# Patient Record
Sex: FEMALE | Race: BLACK OR AFRICAN AMERICAN | Employment: OTHER | URBAN - METROPOLITAN AREA
[De-identification: names, ages, dates, MRNs, and addresses within clinical notes are randomized per-mention and may not be internally consistent; named-entity substitution may affect disease eponyms.]

---

## 2017-05-11 ENCOUNTER — OFFICE VISIT (OUTPATIENT)
Dept: ORTHOPEDIC SURGERY | Facility: CLINIC | Age: 62
End: 2017-05-11

## 2017-05-11 VITALS
HEART RATE: 70 BPM | TEMPERATURE: 98 F | DIASTOLIC BLOOD PRESSURE: 72 MMHG | BODY MASS INDEX: 35.87 KG/M2 | SYSTOLIC BLOOD PRESSURE: 143 MMHG | WEIGHT: 229 LBS

## 2017-05-11 DIAGNOSIS — M16.11 PRIMARY OSTEOARTHRITIS OF RIGHT HIP: Primary | ICD-10-CM

## 2017-05-11 DIAGNOSIS — Z96.641 HISTORY OF TOTAL RIGHT HIP REPLACEMENT: ICD-10-CM

## 2017-05-11 DIAGNOSIS — M16.12 PRIMARY OSTEOARTHRITIS OF LEFT HIP: ICD-10-CM

## 2017-05-11 RX ORDER — BETAMETHASONE SODIUM PHOSPHATE AND BETAMETHASONE ACETATE 3; 3 MG/ML; MG/ML
6 INJECTION, SUSPENSION INTRA-ARTICULAR; INTRALESIONAL; INTRAMUSCULAR; SOFT TISSUE ONCE
Qty: 1 ML | Refills: 0
Start: 2017-05-11 | End: 2017-05-11

## 2017-05-11 NOTE — PATIENT INSTRUCTIONS
A hip injection has been ordered for you. A [employer] will be contacting you to schedule the appointment as soon as it has been approved with your insurance company. Please schedule an appointment to follow up with the doctor or the physicians assistant after the hip injection has been conducted. Joint Injections: Care Instructions  Your Care Instructions  Joint injections are shots into a joint, such as the knee. They may be used to put in medicines, such as pain relievers. Or they can be used to take out fluid. Sometimes the fluid is tested in a lab. This can help find the cause of a joint problem. A corticosteroid, or steroid, shot is used to reduce inflammation in tendons or joints. It is often used to treat problems such as arthritis, tendinitis, and bursitis. Steroids can be injected directly into a painful, inflamed joint. They can also help reduce inflammation of a bursa. A bursa is a sac of fluid. It cushions and lubricates areas where tendons, ligaments, skin, muscles, or bones rub against each other. A steroid shot can sometimes help with short-term pain relief when other treatments haven't worked. If steroid shots help, pain may improve for weeks or months. Follow-up care is a key part of your treatment and safety. Be sure to make and go to all appointments, and call your doctor if you are having problems. It's also a good idea to know your test results and keep a list of the medicines you take. How can you care for yourself at home? · Put ice or a cold pack on the area for 10 to 20 minutes at a time. Put a thin cloth between the ice and your skin. · Take anti-inflammatory medicines to reduce pain, swelling, or inflammation. These include ibuprofen (Advil, Motrin) and naproxen (Aleve). Read and follow all instructions on the label. · Avoid strenuous activities for several days, especially those that put stress on the area where you got the shot.   · If you have dressings over the area, keep them clean and dry. You may remove them when your doctor tells you to. When should you call for help? Call your doctor now or seek immediate medical care if:  · You have signs of infection, such as:  ¨ Increased pain, swelling, warmth, or redness. ¨ Red streaks leading from the site. ¨ Pus draining from the site. ¨ A fever. Watch closely for changes in your health, and be sure to contact your doctor if you have any problems. Where can you learn more? Go to http://rain-tracey.info/. Enter N616 in the search box to learn more about \"Joint Injections: Care Instructions. \"  Current as of: May 23, 2016  Content Version: 11.2  © 5058-8048 [employer]. Care instructions adapted under license by [employer] (which disclaims liability or warranty for this information). If you have questions about a medical condition or this instruction, always ask your healthcare professional. [place] [address] any warranty or liability for your use of this information.

## 2017-05-11 NOTE — MR AVS SNAPSHOT
Visit Information Date & Time Provider Department Dept. Phone Encounter #  
 5/11/2017  9:15 AM Thomas Humphrey MD [facility] [phone] [id number] Upcoming Health Maintenance Date Due Hepatitis C Screening 1955 DTaP/Tdap/Td series (1 - Tdap) 8/28/1975 FOBT Q 1 YEAR AGE 50-75 3/20/2014 ZOSTER VACCINE AGE 60> 8/28/2014 PAP AKA CERVICAL CYTOLOGY 9/20/2014 INFLUENZA AGE 9 TO ADULT 8/1/2017 BREAST CANCER SCRN MAMMOGRAM 12/27/2018 Allergies as of 5/11/2017  Review Complete On: 5/11/2017 By: Thomas Humphrey MD  
 No Known Allergies Current Immunizations  Reviewed on 3/19/2013 No immunizations on file. Not reviewed this visit You Were Diagnosed With   
  
 Codes Comments Primary osteoarthritis of right hip    -  Primary ICD-10-CM: M16.11 
ICD-9-CM: 715.15 History of total right hip replacement     ICD-10-CM: J44.059 ICD-9-CM: V43.64 Primary osteoarthritis of left hip     ICD-10-CM: M16.12 
ICD-9-CM: 715.15 Vitals BP Pulse Temp Weight(growth percentile) BMI OB Status 143/72 (BP 1 Location: Left arm, BP Patient Position: Sitting) 70 98 °F (36.7 °C) (Oral) 229 lb (103.9 kg) 35.87 kg/m2 Postmenopausal  
 Smoking Status Former Smoker Vitals History BMI and BSA Data Body Mass Index Body Surface Area  
 35.87 kg/m 2 2.22 m 2 Preferred Pharmacy Pharmacy Name Phone [phone] [place] [address] [phone] Your Updated Medication List  
  
   
This list is accurate as of: 5/11/17 10:24 AM.  Always use your most recent med list.  
  
  
  
  
 albuterol 90 mcg/actuation inhaler Commonly known as:  Lilly Morillo Take 1-2 Puffs by inhalation every four (4) hours as needed for Wheezing. ALEVE 220 mg tablet Generic drug:  naproxen sodium Take 220 mg by mouth two (2) times daily (with meals). AMITIZA 24 mcg capsule Generic drug:  lubiPROStone Take 24 mcg by mouth. betamethasone 6 mg/mL injection Commonly known as:  CELESTONE SOLUSPAN  
1 mL by IntraBURSal route once for 1 dose. CALCITRATE-VITAMIN D PO Take  by mouth. cetirizine 10 mg tablet Commonly known as:  ZYRTEC Take  by mouth daily. gabapentin 100 mg capsule Commonly known as:  NEURONTIN Take  by mouth three (3) times daily. HYDROcodone-acetaminophen 5-325 mg per tablet Commonly known as:  Fredick Sly Take 1 Tab by mouth every four (4) hours as needed. inhalational spacing device Commonly known as:  AEROCHAMBER  
1 Each by Does Not Apply route as needed. LIPITOR 20 mg tablet Generic drug:  atorvastatin Take 20 mg by mouth daily. Indications: HYPERCHOLESTEROLEMIA  
  
 * meloxicam 15 mg tablet Commonly known as:  MOBIC Take 15 mg by mouth daily. * meloxicam 15 mg tablet Commonly known as:  MOBIC  
1 tab by mouth daily with food MIRALAX 17 gram packet Generic drug:  polyethylene glycol Take 17 g by mouth daily. MUCINEX 1,200 mg Ta12 ER tablet Generic drug:  guaiFENesin Take 1,200 mg by mouth two (2) times a day. prednisoLONE acetate 1 % ophthalmic suspension Commonly known as:  PRED FORTE Administer 1 Drop to both eyes four (4) times daily. REFRESH LIQUIGEL 1 % Dlgl Generic drug:  carboxymethylcellulose sodium Apply  to eye. TYLENOL EXTRA STRENGTH 500 mg tablet Generic drug:  acetaminophen Take 500 mg by mouth every six (6) hours as needed. warfarin 7.5 mg tablet Commonly known as:  COUMADIN Take 1 Tab by mouth daily. * Notice: This list has 2 medication(s) that are the same as other medications prescribed for you. Read the directions carefully, and ask your doctor or other care provider to review them with you. We Performed the Following AMB POC XRAY, HIPS, BILAT, MIN 5 VIEWS [70705 CPT(R)] BETAMETHASONE ACETATE & SODIUM PHOSPHATE INJECTION 3 MG EA. [ [facility]] DRAIN/INJECT LARGE JOINT/BURSA [id number] CPT(R)] To-Do List   
 05/18/2017 Imaging:  XR FLUORO GUIDE ASP/BX/INJ/LOC Patient Instructions A hip injection has been ordered for you. A [facility] [employer] will be contacting you to schedule the appointment as soon as it has been approved with your insurance company. Please schedule an appointment to follow up with the doctor or the physicians assistant after the hip injection has been conducted. Joint Injections: Care Instructions Your Care Instructions Joint injections are shots into a joint, such as the knee. They may be used to put in medicines, such as pain relievers. Or they can be used to take out fluid. Sometimes the fluid is tested in a lab. This can help find the cause of a joint problem. A corticosteroid, or steroid, shot is used to reduce inflammation in tendons or joints. It is often used to treat problems such as arthritis, tendinitis, and bursitis. Steroids can be injected directly into a painful, inflamed joint. They can also help reduce inflammation of a bursa. A bursa is a sac of fluid. It cushions and lubricates areas where tendons, ligaments, skin, muscles, or bones rub against each other. A steroid shot can sometimes help with short-term pain relief when other treatments haven't worked. If steroid shots help, pain may improve for weeks or months. Follow-up care is a key part of your treatment and safety. Be sure to make and go to all appointments, and call your doctor if you are having problems. It's also a good idea to know your test results and keep a list of the medicines you take. How can you care for yourself at home? · Put ice or a cold pack on the area for 10 to 20 minutes at a time. Put a thin cloth between the ice and your skin. · Take anti-inflammatory medicines to reduce pain, swelling, or inflammation. These include ibuprofen (Advil, Motrin) and naproxen (Aleve). Read and follow all instructions on the label. · Avoid strenuous activities for several days, especially those that put stress on the area where you got the shot. · If you have dressings over the area, keep them clean and dry. You may remove them when your doctor tells you to. When should you call for help? Call your doctor now or seek immediate medical care if: 
· You have signs of infection, such as: 
¨ Increased pain, swelling, warmth, or redness. ¨ Red streaks leading from the site. ¨ Pus draining from the site. ¨ A fever. Watch closely for changes in your health, and be sure to contact your doctor if you have any problems. Where can you learn more? Go to http://rain-tracey.info/. Enter N616 in the search box to learn more about \"Joint Injections: Care Instructions. \" Current as of: May 23, 2016 Content Version: 11.2 © 9495-6421 [employer]. Care instructions adapted under license by [employer] (which disclaims liability or warranty for this information). If you have questions about a medical condition or this instruction, always ask your healthcare professional. [address] any warranty or liability for your use of this information. Introducing [facility]! Dear Tristan Kirkpatrick: Thank you for requesting a PostRocket account. Our records indicate that you already have an active PostRocket account. You can access your account anytime at [email or web address] [email or web address]. [employer]/Next Performance Did you know that you can access your hospital and ER discharge instructions at any time in PostRocket? You can also review all of your test results from your hospital stay or ER visit. Additional Information If you have questions, please visit the Frequently Asked Questions section of the [employer] website at [email or web address] Remember, [employer]ena[employer] is NOT to be used for urgent needs. For medical emergencies, dial 911. Now available from your iPhone and Android! Please provide this summary of care documentation to your next provider. Your primary care clinician is listed as Deborah Frausto. If you have any questions after today's visit, please call [phone].

## 2017-05-11 NOTE — PROGRESS NOTES
Patient: Antonella Montiel                MRN: [id number]       SSN: [id number]  YOB: 1955        AGE: 57 y.o. SEX: female  Body mass index is 35.87 kg/(m^2). PCP: Philipp Tang MD  05/11/17    HISTORY: Ms. 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 She has had a right hip replacement done by me about four years ago and done very well. She is plagued with a little bit of bursitis but very happy. She is here today complaining of left hip pain. It is moderate, aching. It can hurt at night. She has groin pain and also lateral pain. The low back is not particularly tender. She denies fevers or chills. She otherwise has been very healthy. She does a fair bit of walking during the day. All systems are reviewed and are updated on the EMR. No recent hospital admissions or health problems. PHYSICAL EXAMINATION:  The right hip examines very benignly. She has good motion. She has a well-healed incision and minimal tenderness. Good strength. The left hip has moderate stiffness with internal rotation. Tenderness in the groin and also laterally. Low back is only minimally tender. Sensation is normal. Calf is non-tender. Homans sign is negative. PROCEDURE:  Under aseptic conditions and after informed, written consent with a time out, the left trochanteric bursa was injected with 1 cc of the Celestone preparation, i.e. 6 mg, which was well tolerated. PLAN:  We will arrange for an intraarticular injection. Hopefully, this will buy her a couple years with routine injections for her. She remains a delightful lady. It has been a pleasure to share in her care.            REVIEW OF SYSTEMS:      CON: negative for weight loss, fever  EYE: negative for double vision  ENT: negative for hoarseness  RS:   negative for Tb  GI:    negative for blood in stool  :  negative for blood in urine  Other systems reviewed and noted below. Past Medical History:   Diagnosis Date    Arthritis     Cancer ([facility] [address] 2001    Lobular situ-Left breast    Chronic pain     DVT (deep venous thrombosis) (HCC)     GERD (gastroesophageal reflux disease)     Hx of radiation therapy     Hyperlipidemia     Hypertension     Low back pain potentially associated with radiculopathy     Osteopenia     mild    Pulmonary embolism (HCC)     S/P hip replacement     Right hip    Trochanteric bursitis of right hip     Wears glasses        Family History   Problem Relation Age of Onset    Asthma Other     Arthritis-osteo Other     Breast Cancer Maternal Aunt     Diabetes Mother     Heart Disease Mother     Alzheimer Mother     Diabetes Sister     Hypertension Sister     COPD Sister     Diabetes Brother     Hypertension Brother        Current Outpatient Prescriptions   Medication Sig Dispense Refill    betamethasone (CELESTONE SOLUSPAN) 6 mg/mL injection 1 mL by IntraBURSal route once for 1 dose. 1 mL 0    naproxen sodium (ALEVE) 220 mg tablet Take 220 mg by mouth two (2) times daily (with meals).  carboxymethylcellulose sodium (REFRESH LIQUIGEL) 1 % dlgl Apply  to eye.  cetirizine (ZYRTEC) 10 mg tablet Take  by mouth daily.  guaiFENesin (MUCINEX) 1,200 mg TM12 ER tablet Take 1,200 mg by mouth two (2) times a day.  polyethylene glycol (MIRALAX) 17 gram packet Take 17 g by mouth daily.  Inhalational Spacing Device (AEROCHAMBER) 1 Each by Does Not Apply route as needed. 1 Device 0    CALCIUM CITRATE/VITAMIN D3 (CALCITRATE-VITAMIN D PO) Take  by mouth.  acetaminophen (TYLENOL EXTRA STRENGTH) 500 mg tablet Take 500 mg by mouth every six (6) hours as needed.  atorvastatin (LIPITOR) 20 mg tablet Take 20 mg by mouth daily.     Indications: HYPERCHOLESTEROLEMIA      meloxicam (MOBIC) 15 mg tablet 1 tab by mouth daily with food 30 Tab 2    gabapentin (NEURONTIN) 100 mg capsule Take  by mouth three (3) times daily.      meloxicam (MOBIC) 15 mg tablet Take 15 mg by mouth daily.  prednisoLONE acetate (PRED FORTE) 1 % ophthalmic suspension Administer 1 Drop to both eyes four (4) times daily.  albuterol (PROVENTIL, VENTOLIN) 90 mcg/actuation inhaler Take 1-2 Puffs by inhalation every four (4) hours as needed for Wheezing. 17 g 0    HYDROcodone-acetaminophen (NORCO) 5-325 mg per tablet Take 1 Tab by mouth every four (4) hours as needed. 20 Tab 0    warfarin (COUMADIN) 7.5 mg tablet Take 1 Tab by mouth daily. 7 Tab 0    lubiPROStone (AMITIZA) 24 mcg capsule Take 24 mcg by mouth. No Known Allergies    Past Surgical History:   Procedure Laterality Date    HX HIP REPLACEMENT  2/12/2003    Right hip    HX MASTECTOMY  2001    Left partial    HX OTHER SURGICAL Bilateral 2014    bilat styes on eyes    HX TUBAL LIGATION  1983       Social History     Social History    Marital status:      Spouse name: N/A    Number of children: N/A    Years of education: N/A     Occupational History    Not on file. Social History Main Topics    Smoking status: Former Smoker     Packs/day: 1.00     Years: 20.00     Types: Cigarettes    Smokeless tobacco: Never Used      Comment: QUIT APPROX 2000    Alcohol use Yes      Comment: HOLIDAYS     Drug use: No    Sexual activity: Not on file     Other Topics Concern    Not on file     Social History Narrative       Visit Vitals    /72 (BP 1 Location: Left arm, BP Patient Position: Sitting)    Pulse 70    Temp 98 °F (36.7 °C) (Oral)    Wt 229 lb (103.9 kg)    BMI 35.87 kg/m2         PHYSICAL EXAMINATION:  GENERAL: Alert and oriented x3, in no acute distress, well-developed, well-nourished, afebrile. HEART: No JVD. EYES: No scleral icterus   NECK: No significant lymphadenopathy   LUNGS: No respiratory compromise or indrawing  ABDOMEN: Soft, non-tender, non-distended. Electronically signed by:  Chilango Huang MD

## 2017-05-23 ENCOUNTER — HOSPITAL ENCOUNTER (OUTPATIENT)
Dept: GENERAL RADIOLOGY | Age: 62
Discharge: HOME | End: 2017-05-23
Attending: ORTHOPAEDIC SURGERY
Payer: COMMERCIAL

## 2017-05-23 DIAGNOSIS — M16.12 PRIMARY OSTEOARTHRITIS OF LEFT HIP: ICD-10-CM

## 2017-05-23 PROCEDURE — 74011636320 HC RX REV CODE- 636/320: Performed by: ORTHOPAEDIC SURGERY

## 2017-05-23 PROCEDURE — 77002 NEEDLE LOCALIZATION BY XRAY: CPT

## 2017-05-23 PROCEDURE — 74011250636 HC RX REV CODE- 250/636: Performed by: ORTHOPAEDIC SURGERY

## 2017-05-23 PROCEDURE — 74011000250 HC RX REV CODE- 250: Performed by: ORTHOPAEDIC SURGERY

## 2017-05-23 RX ORDER — LIDOCAINE HYDROCHLORIDE 10 MG/ML
30 INJECTION, SOLUTION EPIDURAL; INFILTRATION; INTRACAUDAL; PERINEURAL
Status: COMPLETED | OUTPATIENT
Start: 2017-05-23 | End: 2017-05-23

## 2017-05-23 RX ORDER — METHYLPREDNISOLONE ACETATE 40 MG/ML
40 INJECTION, SUSPENSION INTRA-ARTICULAR; INTRALESIONAL; INTRAMUSCULAR; SOFT TISSUE
Status: COMPLETED | OUTPATIENT
Start: 2017-05-23 | End: 2017-05-23

## 2017-05-23 RX ADMIN — IOPAMIDOL 2 ML: 408 INJECTION, SOLUTION INTRATHECAL at 11:00

## 2017-05-23 RX ADMIN — LIDOCAINE HYDROCHLORIDE 12 ML: 10 INJECTION, SOLUTION EPIDURAL; INFILTRATION; INTRACAUDAL; PERINEURAL at 11:00

## 2017-05-23 RX ADMIN — METHYLPREDNISOLONE ACETATE 40 MG: 40 INJECTION, SUSPENSION INTRA-ARTICULAR; INTRALESIONAL; INTRAMUSCULAR; SOFT TISSUE at 11:00

## 2017-08-16 ENCOUNTER — OFFICE VISIT (OUTPATIENT)
Dept: ORTHOPEDIC SURGERY | Facility: CLINIC | Age: 62
End: 2017-08-16

## 2017-08-16 VITALS
TEMPERATURE: 96.6 F | WEIGHT: 232 LBS | OXYGEN SATURATION: 98 % | BODY MASS INDEX: 36.41 KG/M2 | SYSTOLIC BLOOD PRESSURE: 123 MMHG | HEIGHT: 67 IN | HEART RATE: 52 BPM | DIASTOLIC BLOOD PRESSURE: 68 MMHG

## 2017-08-16 DIAGNOSIS — M70.62 TROCHANTERIC BURSITIS OF LEFT HIP: ICD-10-CM

## 2017-08-16 DIAGNOSIS — M16.12 PRIMARY OSTEOARTHRITIS OF LEFT HIP: Primary | ICD-10-CM

## 2017-08-16 RX ORDER — BETAMETHASONE SODIUM PHOSPHATE AND BETAMETHASONE ACETATE 3; 3 MG/ML; MG/ML
6 INJECTION, SUSPENSION INTRA-ARTICULAR; INTRALESIONAL; INTRAMUSCULAR; SOFT TISSUE ONCE
Qty: 1 ML | Refills: 0
Start: 2017-08-16 | End: 2017-08-16

## 2017-08-16 NOTE — PROGRESS NOTES
Patient: Julia Kwok                MRN: [id number]       SSN: [id number]  YOB: 1955        AGE: 57 y.o. SEX: female  Body mass index is 36.34 kg/(m^2). PCP: James Hess MD  08/16/17    HISTORY: I had the pleasure of reviewing Alberta Pereira. Alberta Pereira is a very nice lady. 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  She had a previous right hip replacement and has done well. She is plagued with bursitis bilaterally. She is here today with left hip pain. It is moderate, aching, and mainly laterally based. It is somewhat in the groin. In the springtime, we arranged for an intra-articular and also a bursal injection, and the bursal injection actually helped more than the intra-articular injection. There is more lateral pain and occasional groin pain. She denies fevers or chills and is otherwise feeling well. PHYSICAL EXAMINATION:  On examination today, she is a very nice lady. She has a BMI of 36. She moves the head and neck adequately. There is no respiratory compromise or indrawing. There is no scleral icterus. There is no JVD.  abdominal exam is nontender. The right hip replacement examines benignly. There are nicely healed incisions. The skin is normal.  She has very good strength, including abductors. The left hip has fairly well-preserved range of motion including internal rotation. The calf is nontender. She is tender over the greater trochanter. The low back is also moderately tender as well. There is no foot drop. Sensation is grossly intact. The calf is nontender. Tib/ant and EHL are 5/5. RADIOGRAPHS:  X-rays confirm moderate arthritis of the left hip. IMPRESSION:  My overall impression is mainly trochanteric bursitis, but also some arthritis of the left hip.       PROCEDURE:  Under aseptic conditions and after informed, written consent with a time out, the left trochanteric bursitis was injected with 1 cc of the Celestone preparation, i.e. 6 mg, which was well tolerated. PLAN:  She will return to see us when she would like, and we can also arrange for an intra-articular injection. Hopefully, she is many years away from needing a left hip replacement. We will follow accordingly. REVIEW OF SYSTEMS:      CON: negative for weight loss, fever  EYE: negative for double vision  ENT: negative for hoarseness  RS:   negative for Tb  GI:    negative for blood in stool  :  negative for blood in urine  Other systems reviewed and noted below. Past Medical History:   Diagnosis Date    Arthritis     Cancer ([facility] [address] 2001    Lobular situ-Left breast    Chronic pain     DVT (deep venous thrombosis) (HCC)     GERD (gastroesophageal reflux disease)     Hx of radiation therapy     Hyperlipidemia     Hypertension     Low back pain potentially associated with radiculopathy     Osteopenia     mild    Pulmonary embolism (HCC)     S/P hip replacement     Right hip    Trochanteric bursitis of right hip     Wears glasses        Family History   Problem Relation Age of Onset    Asthma Other     Arthritis-osteo Other     Breast Cancer Maternal Aunt     Diabetes Mother     Heart Disease Mother     Alzheimer Mother     Diabetes Sister     Hypertension Sister     COPD Sister     Diabetes Brother     Hypertension Brother        Current Outpatient Prescriptions   Medication Sig Dispense Refill    betamethasone (CELESTONE SOLUSPAN) 6 mg/mL injection 1 mL by IntraBURSal route once for 1 dose. 1 mL 0    naproxen sodium (ALEVE) 220 mg tablet Take 220 mg by mouth two (2) times daily (with meals).  cetirizine (ZYRTEC) 10 mg tablet Take  by mouth daily.  polyethylene glycol (MIRALAX) 17 gram packet Take 17 g by mouth daily.  albuterol (PROVENTIL, VENTOLIN) 90 mcg/actuation inhaler Take 1-2 Puffs by inhalation every four (4) hours as needed for Wheezing.  17 g 0    Inhalational Spacing Device (AEROCHAMBER) 1 Each by Does Not Apply route as needed. 1 Device 0    CALCIUM CITRATE/VITAMIN D3 (CALCITRATE-VITAMIN D PO) Take  by mouth.  atorvastatin (LIPITOR) 20 mg tablet Take 20 mg by mouth daily. Indications: HYPERCHOLESTEROLEMIA      meloxicam (MOBIC) 15 mg tablet 1 tab by mouth daily with food 30 Tab 2    gabapentin (NEURONTIN) 100 mg capsule Take  by mouth three (3) times daily.  meloxicam (MOBIC) 15 mg tablet Take 15 mg by mouth daily.  carboxymethylcellulose sodium (REFRESH LIQUIGEL) 1 % dlgl Apply  to eye.  guaiFENesin (MUCINEX) 1,200 mg TM12 ER tablet Take 1,200 mg by mouth two (2) times a day.  prednisoLONE acetate (PRED FORTE) 1 % ophthalmic suspension Administer 1 Drop to both eyes four (4) times daily.  HYDROcodone-acetaminophen (NORCO) 5-325 mg per tablet Take 1 Tab by mouth every four (4) hours as needed. 20 Tab 0    warfarin (COUMADIN) 7.5 mg tablet Take 1 Tab by mouth daily. 7 Tab 0    lubiPROStone (AMITIZA) 24 mcg capsule Take 24 mcg by mouth.  acetaminophen (TYLENOL EXTRA STRENGTH) 500 mg tablet Take 500 mg by mouth every six (6) hours as needed. No Known Allergies    Past Surgical History:   Procedure Laterality Date    HX HIP REPLACEMENT  2/12/2003    Right hip    HX MASTECTOMY  2001    Left partial    HX OTHER SURGICAL Bilateral 2014    bilat styes on eyes    HX TUBAL LIGATION  1983       Social History     Social History    Marital status:      Spouse name: N/A    Number of children: N/A    Years of education: N/A     Occupational History    Not on file.      Social History Main Topics    Smoking status: Former Smoker     Packs/day: 1.00     Years: 20.00     Types: Cigarettes    Smokeless tobacco: Never Used      Comment: QUIT APPROX 2000    Alcohol use Yes      Comment: HOLIDAYS     Drug use: No    Sexual activity: Not on file     Other Topics Concern    Not on file     Social History Narrative       Visit Vitals  /68    Pulse (!) 52    Temp 96.6 °F (35.9 °C) (Oral)    Ht 5' 7\" (1.702 m)    Wt 232 lb (105.2 kg)    SpO2 98%    BMI 36.34 kg/m2         PHYSICAL EXAMINATION:  GENERAL: Alert and oriented x3, in no acute distress, well-developed, well-nourished, afebrile. HEART: No JVD. EYES: No scleral icterus   NECK: No significant lymphadenopathy   LUNGS: No respiratory compromise or indrawing  ABDOMEN: Soft, non-tender, non-distended. Electronically signed by:  Brenton Craig MD

## 2017-10-10 ENCOUNTER — OFFICE VISIT (OUTPATIENT)
Dept: ORTHOPEDIC SURGERY | Facility: CLINIC | Age: 62
End: 2017-10-10

## 2017-10-10 DIAGNOSIS — M16.12 PRIMARY OSTEOARTHRITIS OF LEFT HIP: ICD-10-CM

## 2017-10-10 DIAGNOSIS — Z96.641 HISTORY OF TOTAL RIGHT HIP REPLACEMENT: Primary | ICD-10-CM

## 2017-10-10 DIAGNOSIS — M70.62 TROCHANTERIC BURSITIS OF LEFT HIP: ICD-10-CM

## 2017-10-10 RX ORDER — BETAMETHASONE SODIUM PHOSPHATE AND BETAMETHASONE ACETATE 3; 3 MG/ML; MG/ML
6 INJECTION, SUSPENSION INTRA-ARTICULAR; INTRALESIONAL; INTRAMUSCULAR; SOFT TISSUE ONCE
Qty: 1 ML | Refills: 0
Start: 2017-10-10 | End: 2017-10-10

## 2017-10-10 NOTE — PROGRESS NOTES
[address], [address] [place] [address]  [phone]           Patient: Thad Nunez                MRN: [id number]       SSN: [id number]  YOB: 1955        AGE: 60 y.o. SEX: female  There is no height or weight on file to calculate BMI. PCP: Xiao Alxeander MD  10/10/17      This office note has been dictated. REVIEW OF SYSTEMS:  Constitutional: Negative for fever, chills, weight loss and malaise/fatigue. HENT: Negative. Eyes: Negative. Respiratory: Negative. Cardiovascular: Negative. Gastrointestinal: No bowel incontinence or constipation. Genitourinary: No bladder incontinence or saddle anesthesia. Skin: Negative. Neurological: Negative. Endo/Heme/Allergies: Negative. Psychiatric/Behavioral: Negative. Musculoskeletal: As per HPI above. Past Medical History:   Diagnosis Date    Arthritis     Cancer ([facility] [address] 2001    Lobular situ-Left breast    Chronic pain     DVT (deep venous thrombosis) (HCC)     GERD (gastroesophageal reflux disease)     Hx of radiation therapy     Hyperlipidemia     Hypertension     Low back pain potentially associated with radiculopathy     Osteopenia     mild    Pulmonary embolism (HCC)     S/P hip replacement     Right hip    Trochanteric bursitis of right hip     Wears glasses          Current Outpatient Prescriptions:     meloxicam (MOBIC) 15 mg tablet, 1 tab by mouth daily with food, Disp: 30 Tab, Rfl: 2    naproxen sodium (ALEVE) 220 mg tablet, Take 220 mg by mouth two (2) times daily (with meals). , Disp: , Rfl:     gabapentin (NEURONTIN) 100 mg capsule, Take  by mouth three (3) times daily. , Disp: , Rfl:     meloxicam (MOBIC) 15 mg tablet, Take 15 mg by mouth daily. , Disp: , Rfl:     carboxymethylcellulose sodium (REFRESH LIQUIGEL) 1 % dlgl, Apply  to eye., Disp: , Rfl:     cetirizine (ZYRTEC) 10 mg tablet, Take  by mouth daily. , Disp: , Rfl:   guaiFENesin (MUCINEX) 1,200 mg TM12 ER tablet, Take 1,200 mg by mouth two (2) times a day., Disp: , Rfl:     polyethylene glycol (MIRALAX) 17 gram packet, Take 17 g by mouth daily. , Disp: , Rfl:     prednisoLONE acetate (PRED FORTE) 1 % ophthalmic suspension, Administer 1 Drop to both eyes four (4) times daily. , Disp: , Rfl:     albuterol (PROVENTIL, VENTOLIN) 90 mcg/actuation inhaler, Take 1-2 Puffs by inhalation every four (4) hours as needed for Wheezing., Disp: 17 g, Rfl: 0    Inhalational Spacing Device (AEROCHAMBER), 1 Each by Does Not Apply route as needed. , Disp: 1 Device, Rfl: 0    HYDROcodone-acetaminophen (NORCO) 5-325 mg per tablet, Take 1 Tab by mouth every four (4) hours as needed. , Disp: 20 Tab, Rfl: 0    warfarin (COUMADIN) 7.5 mg tablet, Take 1 Tab by mouth daily. , Disp: 7 Tab, Rfl: 0    CALCIUM CITRATE/VITAMIN D3 (CALCITRATE-VITAMIN D PO), Take  by mouth., Disp: , Rfl:     lubiPROStone (AMITIZA) 24 mcg capsule, Take 24 mcg by mouth.  , Disp: , Rfl:     acetaminophen (TYLENOL EXTRA STRENGTH) 500 mg tablet, Take 500 mg by mouth every six (6) hours as needed. , Disp: , Rfl:     atorvastatin (LIPITOR) 20 mg tablet, Take 20 mg by mouth daily. Indications: HYPERCHOLESTEROLEMIA, Disp: , Rfl:     No Known Allergies    Social History     Social History    Marital status:      Spouse name: N/A    Number of children: N/A    Years of education: N/A     Occupational History    Not on file.      Social History Main Topics    Smoking status: Former Smoker     Packs/day: 1.00     Years: 20.00     Types: Cigarettes    Smokeless tobacco: Never Used      Comment: QUIT APPROX 2000    Alcohol use Yes      Comment: HOLIDAYS     Drug use: No    Sexual activity: Not on file     Other Topics Concern    Not on file     Social History Narrative       Past Surgical History:   Procedure Laterality Date    HX HIP REPLACEMENT  2/12/2003    Right hip    HX MASTECTOMY  2001    Left partial    HX OTHER SURGICAL Bilateral 2014    bilat styes on eyes    HX TUBAL LIGATION  1983           * Patient was identified by name and date of birth   * Agreement on procedure being performed was verified  * Risks and Benefits explained to the patient  * Procedure site verified and marked as necessary  * Patient was positioned for comfort  * Consent was signed and verified  10:07 AM    The patient was instructed on post injection care. We did see Ms. Albaro Lugo for followup with regards to her bilateral hips. She is status post right hip replacement. She is now about four years out. She is doing quite well with it. She does have known advancing arthritis of the left hip. She has had an intra-articular injection, which gave her good relief. She is having more laterally based discomfort to the left hip. She does have a history of trochanteric bursitis and had injections in the past.  She is requesting a repeat injection today. She has had no recent fevers, chills, systemic changes, and no injuries to report and no chest pain or shortness of breath. She has had no change in her bowel or bladder habits and no radiating pain or numbness down the lower extremities. PHYSICAL EXAMINATION:  In general, the patient is alert and oriented x 3 in no acute distress. The patient is well-developed, well-nourished, with a normal affect. The patient is afebrile. HEENT:  Head is normocephalic and atraumatic. Pupils are equally round and reactive to light and accommodation. Extraocular eye movements are intact. Neck is supple. Trachea is midline. No JVD is present. Breathing is nonlabored. Examination of the lower extremities reveals good range of motion of the right hip. There is some slight tenderness to palpation to the trochanteric bursa. Abduction strength is 5/5 and symmetric bilaterally. The left hip has slightly decreased range of motion. Internal rotation does reproduce some groin discomfort.   She does have pain to palpation to the trochanteric bursa laterally. RADIOGRAPHS:  Review of previous radiographs show on the right total knee components are well-fixed. No evidence of loosening or fracture is noted. She does have some reaction towards the distal stem without loosening noted. The left hip shows advanced osteoarthritis with minimal cartilage remaining. ASSESSMENT:      1. Right hip replacement doing well. 2. Left hip advanced osteoarthritis. 3. Left hip trochanteric bursitis. PLAN:  At this point, we are going to move forward with a cortisone injection for hypertension left hip today. Under aseptic conditions, and after informed and written consent, and a time out performed, the left hip was prepped with Betadine and 6 mg of Celestone was injected without complications. The patient tolerated the injection well. The patient was instructed on post injection care. We will see her back in the office in about three months time for evaluation. We will plan on repeat x-rays of her hips upon her return.                       JR Clemente SANDERS, JASON, ATC

## 2017-11-30 ENCOUNTER — TELEPHONE (OUTPATIENT)
Dept: ORTHOPEDIC SURGERY | Age: 62
End: 2017-11-30

## 2017-11-30 NOTE — TELEPHONE ENCOUNTER
Patient called to request another left hip injection. Patient received last injection 10/10/17 and was advised next visit scheduled for 1/11/18, but patient doesn't want to wait that long for injection, can we offer her an injection before then?   Please advise patient at her work # [phone]

## 2017-11-30 NOTE — TELEPHONE ENCOUNTER
Called patient and explained that we are unable to give her an injection at a sooner date. Patient understood.

## 2018-01-02 ENCOUNTER — HOSPITAL ENCOUNTER (OUTPATIENT)
Dept: MAMMOGRAPHY | Age: 63
Discharge: HOME | End: 2018-01-02
Attending: FAMILY MEDICINE
Payer: COMMERCIAL

## 2018-01-02 DIAGNOSIS — Z12.31 VISIT FOR SCREENING MAMMOGRAM: ICD-10-CM

## 2018-01-02 PROCEDURE — 77063 BREAST TOMOSYNTHESIS BI: CPT

## 2018-01-25 ENCOUNTER — OFFICE VISIT (OUTPATIENT)
Dept: ORTHOPEDIC SURGERY | Facility: CLINIC | Age: 63
End: 2018-01-25

## 2018-01-25 VITALS
OXYGEN SATURATION: 99 % | HEART RATE: 55 BPM | HEIGHT: 67 IN | TEMPERATURE: 96.6 F | BODY MASS INDEX: 35.47 KG/M2 | WEIGHT: 226 LBS | DIASTOLIC BLOOD PRESSURE: 77 MMHG | SYSTOLIC BLOOD PRESSURE: 137 MMHG

## 2018-01-25 DIAGNOSIS — M70.61 TROCHANTERIC BURSITIS, RIGHT HIP: Primary | ICD-10-CM

## 2018-01-25 RX ORDER — BETAMETHASONE SODIUM PHOSPHATE AND BETAMETHASONE ACETATE 3; 3 MG/ML; MG/ML
6 INJECTION, SUSPENSION INTRA-ARTICULAR; INTRALESIONAL; INTRAMUSCULAR; SOFT TISSUE ONCE
Qty: 1 ML | Refills: 0
Start: 2018-01-25 | End: 2018-01-25

## 2018-01-25 NOTE — PROGRESS NOTES
[address] [place] [address]  [phone]           Patient: Ashok Graf                MRN: [id number]       SSN: [id number]  YOB: 1955        AGE: 60 y.o. SEX: female  Body mass index is 35.4 kg/(m^2). PCP: Susanne Ochoa MD  01/25/18      This office note has been dictated. REVIEW OF SYSTEMS:  Constitutional: Negative for fever, chills, weight loss and malaise/fatigue. HENT: Negative. Eyes: Negative. Respiratory: Negative. Cardiovascular: Negative. Gastrointestinal: No bowel incontinence or constipation. Genitourinary: No bladder incontinence or saddle anesthesia. Skin: Negative. Neurological: Negative. Endo/Heme/Allergies: Negative. Psychiatric/Behavioral: Negative. Musculoskeletal: As per HPI above. Past Medical History:   Diagnosis Date    Arthritis     Breast cancer ([facility] [address]     Left    Cancer ([facility] [address] 2001    Lobular situ-Left breast    Chronic pain     DVT (deep venous thrombosis) (HCC)     GERD (gastroesophageal reflux disease)     Hx of radiation therapy     Hyperlipidemia     Hypertension     Low back pain potentially associated with radiculopathy     Menopause     Osteopenia     mild    Pulmonary embolism (HCC)     Radiation therapy complication     Left breast    S/P hip replacement     Right hip    Trochanteric bursitis of right hip     Wears glasses          Current Outpatient Prescriptions:     naproxen sodium (ALEVE) 220 mg tablet, Take 220 mg by mouth two (2) times daily (with meals). , Disp: , Rfl:     cetirizine (ZYRTEC) 10 mg tablet, Take  by mouth daily. , Disp: , Rfl:     polyethylene glycol (MIRALAX) 17 gram packet, Take 17 g by mouth daily. , Disp: , Rfl:     prednisoLONE acetate (PRED FORTE) 1 % ophthalmic suspension, Administer 1 Drop to both eyes four (4) times daily. , Disp: , Rfl:     albuterol (PROVENTIL, VENTOLIN) 90 mcg/actuation inhaler, Take 1-2 Puffs by inhalation every four (4) hours as needed for Wheezing., Disp: 17 g, Rfl: 0    Inhalational Spacing Device (AEROCHAMBER), 1 Each by Does Not Apply route as needed. , Disp: 1 Device, Rfl: 0    CALCIUM CITRATE/VITAMIN D3 (CALCITRATE-VITAMIN D PO), Take  by mouth., Disp: , Rfl:     acetaminophen (TYLENOL EXTRA STRENGTH) 500 mg tablet, Take 500 mg by mouth every six (6) hours as needed. , Disp: , Rfl:     atorvastatin (LIPITOR) 20 mg tablet, Take 20 mg by mouth daily. Indications: HYPERCHOLESTEROLEMIA, Disp: , Rfl:     meloxicam (MOBIC) 15 mg tablet, 1 tab by mouth daily with food, Disp: 30 Tab, Rfl: 2    gabapentin (NEURONTIN) 100 mg capsule, Take  by mouth three (3) times daily. , Disp: , Rfl:     meloxicam (MOBIC) 15 mg tablet, Take 15 mg by mouth daily. , Disp: , Rfl:     carboxymethylcellulose sodium (REFRESH LIQUIGEL) 1 % dlgl, Apply  to eye., Disp: , Rfl:     guaiFENesin (MUCINEX) 1,200 mg TM12 ER tablet, Take 1,200 mg by mouth two (2) times a day., Disp: , Rfl:     HYDROcodone-acetaminophen (NORCO) 5-325 mg per tablet, Take 1 Tab by mouth every four (4) hours as needed. , Disp: 20 Tab, Rfl: 0    warfarin (COUMADIN) 7.5 mg tablet, Take 1 Tab by mouth daily. , Disp: 7 Tab, Rfl: 0    lubiPROStone (AMITIZA) 24 mcg capsule, Take 24 mcg by mouth.  , Disp: , Rfl:     No Known Allergies    Social History     Social History    Marital status:      Spouse name: N/A    Number of children: N/A    Years of education: N/A     Occupational History    Not on file.      Social History Main Topics    Smoking status: Former Smoker     Packs/day: 1.00     Years: 20.00     Types: Cigarettes    Smokeless tobacco: Never Used      Comment: QUIT APPROX 2000    Alcohol use Yes      Comment: HOLIDAYS     Drug use: No    Sexual activity: Not on file     Other Topics Concern    Not on file     Social History Narrative       Past Surgical History:   Procedure Laterality Date    HX HIP REPLACEMENT  2/12/2003    Right hip    HX MASTECTOMY  2001    Left partial    HX OTHER SURGICAL Bilateral 2014    bilat styes on eyes    HX TUBAL LIGATION  1983           * Patient was identified by name and date of birth   * Agreement on procedure being performed was verified  * Risks and Benefits explained to the patient  * Procedure site verified and marked as necessary  * Patient was positioned for comfort  * Consent was signed and verified  10:56 AM    The patient was instructed on post injection care. We did see Ms. Marilin Smiley for followup with regards to her bilateral hips. She is status post right hip replacement and has done quite well with it. The left hip does have known advanced arthritis. She is not quite ready for surgical intervention yet. She has had intraarticular injection, which gave her some relief. Her main complaint is that of laterally-based discomfort. At this point, she is requesting injection for bursitis. She has had no recent fever, chills, systemic changes, or injuries to report and no chest pain or shortness of breath. PHYSICAL EXAMINATION:  In general, the patient is alert and oriented x 3 in no acute distress. The patient is well-developed, well-nourished, with a normal affect. The patient is afebrile. HEENT:  Head is normocephalic and atraumatic. Pupils are equally round and reactive to light and accommodation. Extraocular eye movements are intact. Neck is supple. Trachea is midline. No JVD is present. Breathing is nonlabored. Examination of the lower extremities reveals good range of motion of the hips. There is no pain to palpation of the greater trochanteric bursa. The surgical wounds are healed up nicely. The left hip has decreased range of motion with internal rotation causing groin and thigh discomfort. There is negative straight leg raise. There is negative calf tenderness. There is negative Libbys. There is no evidence of DVT present.  There is pain to palpation to the trochanteric bursa on the left side. RADIOGRAPHS:  Review of previous radiographs show the right total hip components to be well-fixed. No evidence of loosening or fracture noted. The lip does confirm advanced osteoarthritis. ASSESSMENT:      1. Right hip replacement doing well. 2. Left hip advanced osteoarthritis. 3. Left hip trochanteric bursitis. PLAN:  At this point, we are going to move forward with a repeat injection for the left hip today for trochanteric bursitis. Under aseptic conditions, after informed and written consent, with appropriate time out performed at 10:57 am, the left hip was prepped with Betadine and 6 mg of Celestone was injected without complications. The patient tolerated the injection well. The patient was instructed on post injection care. We will see her back in the office in about three months time for evaluation and repeat injection. We will plan on the repeat x-rays, AP of the pelvis, upon her return.                          JR Clemente SANDERS, JASON, ATC

## 2018-01-30 ENCOUNTER — HOSPITAL ENCOUNTER (OUTPATIENT)
Dept: ULTRASOUND IMAGING | Age: 63
Discharge: HOME | End: 2018-01-30
Attending: FAMILY MEDICINE
Payer: COMMERCIAL

## 2018-01-30 DIAGNOSIS — N63.0 LUMP OR MASS IN BREAST: ICD-10-CM

## 2018-01-30 DIAGNOSIS — R92.8 ABNORMAL MAMMOGRAM: ICD-10-CM

## 2018-01-30 PROCEDURE — 76642 ULTRASOUND BREAST LIMITED: CPT

## 2018-03-15 ENCOUNTER — HOSPITAL ENCOUNTER (OUTPATIENT)
Dept: LAB | Age: 63
Discharge: HOME | End: 2018-03-15

## 2018-03-15 LAB — SENTARA SPECIMEN COL,SENBCF: NORMAL

## 2018-03-15 PROCEDURE — 99001 SPECIMEN HANDLING PT-LAB: CPT | Performed by: FAMILY MEDICINE

## 2018-04-30 ENCOUNTER — OFFICE VISIT (OUTPATIENT)
Dept: ORTHOPEDIC SURGERY | Facility: CLINIC | Age: 63
End: 2018-04-30

## 2018-04-30 VITALS
TEMPERATURE: 98.2 F | SYSTOLIC BLOOD PRESSURE: 156 MMHG | HEART RATE: 57 BPM | DIASTOLIC BLOOD PRESSURE: 82 MMHG | BODY MASS INDEX: 35.75 KG/M2 | HEIGHT: 67 IN | RESPIRATION RATE: 18 BRPM | OXYGEN SATURATION: 100 % | WEIGHT: 227.8 LBS

## 2018-04-30 DIAGNOSIS — M25.552 LEFT HIP PAIN: Primary | ICD-10-CM

## 2018-04-30 PROBLEM — E66.01 SEVERE OBESITY (BMI 35.0-39.9) WITH COMORBIDITY (HCC): Status: ACTIVE | Noted: 2018-04-30

## 2018-04-30 RX ORDER — DICLOFENAC SODIUM 10 MG/G
4 GEL TOPICAL 4 TIMES DAILY
Qty: 5 EACH | Refills: 0 | Status: SHIPPED | OUTPATIENT
Start: 2018-04-30 | End: 2019-01-16

## 2018-04-30 NOTE — PROGRESS NOTES
[address] [place] [address]  [phone]           Patient: Arlet Ventura                MRN: [id number]       SSN: [id number]  YOB: 1955        AGE: 60 y.o. SEX: female  Body mass index is 35.68 kg/(m^2). PCP: Rika Moya MD  04/30/18      This office note has been dictated. REVIEW OF SYSTEMS:  Constitutional: Negative for fever, chills, weight loss and malaise/fatigue. HENT: Negative. Eyes: Negative. Respiratory: Negative. Cardiovascular: Negative. Gastrointestinal: No bowel incontinence or constipation. Genitourinary: No bladder incontinence or saddle anesthesia. Skin: Negative. Neurological: Negative. Endo/Heme/Allergies: Negative. Psychiatric/Behavioral: Negative. Musculoskeletal: As per HPI above. Past Medical History:   Diagnosis Date    Arthritis     Breast cancer ([facility] [address]     Left    Cancer ([facility] [address] 2001    Lobular situ-Left breast    Chronic pain     DVT (deep venous thrombosis) (HCC)     GERD (gastroesophageal reflux disease)     Hx of radiation therapy     Hyperlipidemia     Hypertension     Low back pain potentially associated with radiculopathy     Menopause     Osteopenia     mild    Pulmonary embolism (HCC)     Radiation therapy complication     Left breast    S/P hip replacement     Right hip    Trochanteric bursitis of right hip     Wears glasses          Current Outpatient Prescriptions:     diclofenac (VOLTAREN) 1 % gel, Apply 4 g to affected area four (4) times daily. Maximum 16 grams per joint per day. Dispense 5 100 gram tubes, Disp: 5 Each, Rfl: 0    naproxen sodium (ALEVE) 220 mg tablet, Take 220 mg by mouth two (2) times daily (with meals). , Disp: , Rfl:     cetirizine (ZYRTEC) 10 mg tablet, Take  by mouth daily. , Disp: , Rfl:     polyethylene glycol (MIRALAX) 17 gram packet, Take 17 g by mouth daily. , Disp: , Rfl:     albuterol (PROVENTIL, VENTOLIN) 90 mcg/actuation inhaler, Take 1-2 Puffs by inhalation every four (4) hours as needed for Wheezing., Disp: 17 g, Rfl: 0    CALCIUM CITRATE/VITAMIN D3 (CALCITRATE-VITAMIN D PO), Take  by mouth., Disp: , Rfl:     atorvastatin (LIPITOR) 20 mg tablet, Take 20 mg by mouth daily. Indications: HYPERCHOLESTEROLEMIA, Disp: , Rfl:     meloxicam (MOBIC) 15 mg tablet, 1 tab by mouth daily with food, Disp: 30 Tab, Rfl: 2    gabapentin (NEURONTIN) 100 mg capsule, Take  by mouth three (3) times daily. , Disp: , Rfl:     meloxicam (MOBIC) 15 mg tablet, Take 15 mg by mouth daily. , Disp: , Rfl:     carboxymethylcellulose sodium (REFRESH LIQUIGEL) 1 % dlgl, Apply  to eye., Disp: , Rfl:     guaiFENesin (MUCINEX) 1,200 mg TM12 ER tablet, Take 1,200 mg by mouth two (2) times a day., Disp: , Rfl:     prednisoLONE acetate (PRED FORTE) 1 % ophthalmic suspension, Administer 1 Drop to both eyes four (4) times daily. , Disp: , Rfl:     Inhalational Spacing Device (AEROCHAMBER), 1 Each by Does Not Apply route as needed. , Disp: 1 Device, Rfl: 0    HYDROcodone-acetaminophen (NORCO) 5-325 mg per tablet, Take 1 Tab by mouth every four (4) hours as needed. , Disp: 20 Tab, Rfl: 0    warfarin (COUMADIN) 7.5 mg tablet, Take 1 Tab by mouth daily. , Disp: 7 Tab, Rfl: 0    lubiPROStone (AMITIZA) 24 mcg capsule, Take 24 mcg by mouth.  , Disp: , Rfl:     acetaminophen (TYLENOL EXTRA STRENGTH) 500 mg tablet, Take 500 mg by mouth every six (6) hours as needed. , Disp: , Rfl:     No Known Allergies    Social History     Social History    Marital status:      Spouse name: N/A    Number of children: N/A    Years of education: N/A     Occupational History    Not on file. Social History Main Topics    Smoking status: Former Smoker     Packs/day: 1.00     Years: 20.00     Types: Cigarettes    Smokeless tobacco: Never Used      Comment: QUIT APPROX 2000    Alcohol use Yes      Comment: HOLIDAYS     Drug use:  No  Sexual activity: Not on file     Other Topics Concern    Not on file     Social History Narrative       Past Surgical History:   Procedure Laterality Date    HX HIP REPLACEMENT  2/12/2003    Right hip    HX MASTECTOMY  2001    Left partial    HX OTHER SURGICAL Bilateral 2014    bilat styes on eyes    HX TUBAL LIGATION  1983           We did see Ms. Shannon Larson for followup with regards to her bilateral hips. She is status post right total hip replacement, now about six years out, and she is doing quite well. She is quite happy with the hip replacement surgery. She is having no pain in the hip replacement. She does have a little trochanteric bursitis. She has had injections in the past, and she is looking forward to getting an injection prior to an upcoming trip to the [facility] in July. She does have known advanced arthritis of the left hip, which is bothering her intermittently. She does have discomfort in the groin with certain movements. She has some occasional night pain. She takes anti-inflammatory medications for this, which helps. She has had recent fevers, chills, systemic changes, or injuries to report. PHYSICAL EXAMINATION:  In general, the patient is alert and oriented x 3 in no acute distress. The patient is well-developed, well-nourished, with a normal affect. The patient is afebrile. HEENT:  Head is normocephalic and atraumatic. Pupils are equally round and reactive to light and accommodation. Extraocular eye movements are intact. Neck is supple. Trachea is midline. No JVD is present. Breathing is nonlabored. Examination of the lower extremities reveals good range of motion of the right hip. The left is decreased slightly with internal rotation causing groin and thigh discomfort. There is negative straight leg raise. There is negative calf tenderness. There is negative Libbys. There is no evidence of DVT present.    There is a little discomfort to palpation of the trochanteric bursae on the right side. RADIOGRAPHS:  Radiographs in the office today, including AP of the pelvis, shows the right total hip components are well-fixed. No evidence of loosening or fracture is noted. The left hip does confirm advanced osteoarthritis with superior wear with near bone-to-bone eburnation. No acute abnormalities are noted. ASSESSMENT:      1. Status post right hip replacement. 2. Advanced osteoarthritis. PLAN:  At this point, we discussed treatment options. The patient is trying to wait until halfway before fixing the left hip. We will plan on seeing her back sometime before her upcoming cruise for an injection for the bursitis on the right side. She will call with any questions or concerns that shall arise.                  JR Clemente SANDERS, JASON, ATC

## 2018-05-01 ENCOUNTER — HOSPITAL ENCOUNTER (OUTPATIENT)
Dept: OCCUPATIONAL MEDICINE | Age: 63
Discharge: HOME | End: 2018-05-01
Attending: FAMILY MEDICINE

## 2018-05-01 DIAGNOSIS — T14.90XA INJURY: ICD-10-CM

## 2018-07-19 ENCOUNTER — OFFICE VISIT (OUTPATIENT)
Dept: ORTHOPEDIC SURGERY | Facility: CLINIC | Age: 63
End: 2018-07-19

## 2018-07-19 VITALS
OXYGEN SATURATION: 98 % | DIASTOLIC BLOOD PRESSURE: 70 MMHG | HEART RATE: 49 BPM | RESPIRATION RATE: 16 BRPM | SYSTOLIC BLOOD PRESSURE: 134 MMHG | BODY MASS INDEX: 35.35 KG/M2 | HEIGHT: 67 IN | WEIGHT: 225.2 LBS | TEMPERATURE: 97.2 F

## 2018-07-19 DIAGNOSIS — M16.12 PRIMARY OSTEOARTHRITIS OF LEFT HIP: Primary | ICD-10-CM

## 2018-07-19 DIAGNOSIS — M70.62 TROCHANTERIC BURSITIS OF LEFT HIP: ICD-10-CM

## 2018-07-19 DIAGNOSIS — M70.61 TROCHANTERIC BURSITIS, RIGHT HIP: ICD-10-CM

## 2018-07-19 DIAGNOSIS — Z96.641 HISTORY OF TOTAL RIGHT HIP REPLACEMENT: ICD-10-CM

## 2018-07-19 RX ORDER — BETAMETHASONE SODIUM PHOSPHATE AND BETAMETHASONE ACETATE 3; 3 MG/ML; MG/ML
6 INJECTION, SUSPENSION INTRA-ARTICULAR; INTRALESIONAL; INTRAMUSCULAR; SOFT TISSUE ONCE
Qty: 1 ML | Refills: 0
Start: 2018-07-19 | End: 2018-07-19

## 2018-10-03 DIAGNOSIS — G89.29 CHRONIC HIP PAIN, LEFT: Primary | ICD-10-CM

## 2018-10-03 DIAGNOSIS — M25.552 CHRONIC HIP PAIN, LEFT: Primary | ICD-10-CM

## 2018-10-03 NOTE — TELEPHONE ENCOUNTER
Patient would like something for L Hip pain.   She states she spoke with  this morning at the hospital and he ask her to call the office to have message put in    [address] [facility]    Her# [phone]

## 2018-10-03 NOTE — TELEPHONE ENCOUNTER
Patient notified Brynn Mikhail will not be at the [facility] office until tomorrow morning and that her Rx will be ready after 8:30 a.m once he signs it. Patient verbalized understanding.

## 2018-10-04 RX ORDER — HYDROCODONE BITARTRATE AND ACETAMINOPHEN 7.5; 325 MG/1; MG/1
1 TABLET ORAL
Qty: 28 TAB | Refills: 0 | Status: SHIPPED | OUTPATIENT
Start: 2018-10-04 | End: 2019-01-16

## 2018-10-25 ENCOUNTER — OFFICE VISIT (OUTPATIENT)
Dept: ORTHOPEDIC SURGERY | Facility: CLINIC | Age: 63
End: 2018-10-25

## 2018-10-25 VITALS
BODY MASS INDEX: 35.5 KG/M2 | DIASTOLIC BLOOD PRESSURE: 68 MMHG | HEIGHT: 67 IN | HEART RATE: 60 BPM | SYSTOLIC BLOOD PRESSURE: 144 MMHG | TEMPERATURE: 95.9 F | WEIGHT: 226.2 LBS | OXYGEN SATURATION: 98 % | RESPIRATION RATE: 18 BRPM

## 2018-10-25 DIAGNOSIS — M16.12 PRIMARY LOCALIZED OSTEOARTHRITIS OF LEFT HIP: ICD-10-CM

## 2018-10-25 DIAGNOSIS — M25.552 LEFT HIP PAIN: Primary | ICD-10-CM

## 2018-10-25 NOTE — PROGRESS NOTES
[address], [address] [facility] [address] 
[phone] Patient: Catalina Manriquez                MRN: [id number]       SSN: [id number] YOB: 1955        AGE: 58 y.o. SEX: female Body mass index is 35.43 kg/m². PCP: Rosa Esquivel MD 
10/25/18 This office note has been dictated. REVIEW OF SYSTEMS: 
Constitutional: Negative for fever, chills, weight loss and malaise/fatigue. HENT: Negative. Eyes: Negative. Respiratory: Negative. Cardiovascular: Negative. Gastrointestinal: No bowel incontinence or constipation. Genitourinary: No bladder incontinence or saddle anesthesia. Skin: Negative. Neurological: Negative. Endo/Heme/Allergies: Negative. Psychiatric/Behavioral: Negative. Musculoskeletal: As per HPI above. Past Medical History:  
Diagnosis Date  Arthritis  Breast cancer ([facility] [address] Left  Cancer ([address] 2001 Lobular situ-Left breast  
 Chronic pain  DVT (deep venous thrombosis) ([address]  GERD (gastroesophageal reflux disease)  Hx of radiation therapy  Hyperlipidemia  Hypertension  Low back pain potentially associated with radiculopathy  Menopause  Osteopenia   
 mild  Pulmonary embolism ([address]  Radiation therapy complication Left breast  
 S/P hip replacement Right hip  Trochanteric bursitis of right hip  Wears glasses Current Outpatient Medications:  
  HYDROcodone-acetaminophen (NORCO) 7.5-325 mg per tablet, Take 1 Tab by mouth every six (6) hours as needed for Pain. Max Daily Amount: 4 Tabs., Disp: 28 Tab, Rfl: 0 
  naproxen sodium (ALEVE) 220 mg tablet, Take 220 mg by mouth two (2) times daily (with meals). , Disp: , Rfl:  
  cetirizine (ZYRTEC) 10 mg tablet, Take  by mouth daily. , Disp: , Rfl:  
  polyethylene glycol (MIRALAX) 17 gram packet, Take 17 g by mouth daily. , Disp: , Rfl:  
   albuterol (PROVENTIL, VENTOLIN) 90 mcg/actuation inhaler, Take 1-2 Puffs by inhalation every four (4) hours as needed for Wheezing., Disp: 17 g, Rfl: 0 
  Inhalational Spacing Device (AEROCHAMBER), 1 Each by Does Not Apply route as needed. , Disp: 1 Device, Rfl: 0 
  CALCIUM CITRATE/VITAMIN D3 (CALCITRATE-VITAMIN D PO), Take  by mouth., Disp: , Rfl:  
  atorvastatin (LIPITOR) 20 mg tablet, Take 20 mg by mouth daily. Indications: HYPERCHOLESTEROLEMIA, Disp: , Rfl:  
  diclofenac (VOLTAREN) 1 % gel, Apply 4 g to affected area four (4) times daily. Maximum 16 grams per joint per day. Dispense 5 100 gram tubes, Disp: 5 Each, Rfl: 0 
  meloxicam (MOBIC) 15 mg tablet, 1 tab by mouth daily with food, Disp: 30 Tab, Rfl: 2 
  gabapentin (NEURONTIN) 100 mg capsule, Take  by mouth three (3) times daily. , Disp: , Rfl:  
  carboxymethylcellulose sodium (REFRESH LIQUIGEL) 1 % dlgl, Apply  to eye., Disp: , Rfl:  
  guaiFENesin (MUCINEX) 1,200 mg TM12 ER tablet, Take 1,200 mg by mouth two (2) times a day., Disp: , Rfl:  
  prednisoLONE acetate (PRED FORTE) 1 % ophthalmic suspension, Administer 1 Drop to both eyes four (4) times daily. , Disp: , Rfl:  
  warfarin (COUMADIN) 7.5 mg tablet, Take 1 Tab by mouth daily. , Disp: 7 Tab, Rfl: 0 
  lubiPROStone (AMITIZA) 24 mcg capsule, Take 24 mcg by mouth.  , Disp: , Rfl:  
  acetaminophen (TYLENOL EXTRA STRENGTH) 500 mg tablet, Take 500 mg by mouth every six (6) hours as needed. , Disp: , Rfl:  
 
No Known Allergies Social History Socioeconomic History  Marital status:  Spouse name: Not on file  Number of children: Not on file  Years of education: Not on file  Highest education level: Not on file Social Needs  Financial resource strain: Not on file  Food insecurity - worry: Not on file  Food insecurity - inability: Not on file  Transportation needs - medical: Not on file  Transportation needs - non-medical: Not on file Occupational History  Not on file Tobacco Use  Smoking status: Former Smoker Packs/day: 1.00 Years: 20.00 Pack years: 20.00 Types: Cigarettes  Smokeless tobacco: Never Used  Tobacco comment: QUIT APPROX 2000 Substance and Sexual Activity  Alcohol use: Yes Comment: HOLIDAYS  Drug use: No  
 Sexual activity: Not on file Other Topics Concern  Not on file Social History Narrative  Not on file Past Surgical History:  
Procedure Laterality Date  HX HIP REPLACEMENT  2/12/2003 Right hip  HX MASTECTOMY  2001 Left partial  
 HX OTHER SURGICAL Bilateral 2014  
 bilat styes on eyes Tony Hathaway We did see Ms. Jeny Ryan for followup with regards to her bilateral hips. She is status post right hip replacement. She is now about five years out. She has done extremely well with her hip replacement. She is quite happy with the results. The left hip has really been bothering her. She has had conservative treatment and has had injections in the past, which have become less effective. She has pain on a daily basis affecting her quality of life and activities of daily living. She would like to move forward with getting it replaced. PHYSICAL EXAMINATION:  In general, the patient is alert and oriented x 3 in no acute distress. The patient is well-developed, well-nourished, with a normal affect. The patient is afebrile. HEENT:  Head is normocephalic and atraumatic. Pupils are equally round and reactive to light and accommodation. Extraocular eye movements are intact. Neck is supple. Trachea is midline. No JVD is present. Breathing is nonlabored. Examination of the lower extremities reveals pain-free range of motion of the right hip. There is no pain to palpation of the greater trochanteric bursa.   The left hip has decreased range of motion with internal and external rotation causing groin and thigh discomfort. There is negative straight leg raise. There is negative calf tenderness. There is negative Libbys. There is no evidence of DVT present. Leg lengths show the left lower extremity to be slightly shorter versus sitting in a chair. RADIOGRAPHS:  Radiographs in the office today, including AP of the pelvis and AP and cross table of the left hip, show the right hip components to be well-fixed. No evidence of loosening or fracture is noted. The left hip does confirm end-staged arthritis with bone-to-bone eburnation. ASSESSMENT:   
 
1. Left hip end-stage osteoarthritis. 2. Right hip replacement doing well. PLAN:  At this point, we are going to move forward with getting her scheduled for a left hip replacement. She does have a history of a DVT/PE. We will get her to see Vascular for likely an IVC filter preoperatively. The alternatives, risks, and, including but not limited to infection, blood loss, need for blood transfusion, neurovascular damage, bone fracture, anesthetic complications, DVT, PE, postoperative stiffness and pain, prosthesis longevity, need for more surgery, MI, and stroke, have been discussed. The patient understands and wishes to proceed with surgery. We please ask Dr. Maggie Salinas to provide medical clearance.   
 
cc:  MD JR Clemente Mkceon MPAS, PA-C, ATC

## 2018-11-07 ENCOUNTER — HOSPITAL ENCOUNTER (OUTPATIENT)
Dept: GENERAL RADIOLOGY | Age: 63
Discharge: HOME | End: 2018-11-07
Payer: COMMERCIAL

## 2018-11-07 DIAGNOSIS — M25.562 LEFT KNEE PAIN: ICD-10-CM

## 2018-11-07 PROCEDURE — 73562 X-RAY EXAM OF KNEE 3: CPT

## 2018-11-08 ENCOUNTER — HOSPITAL ENCOUNTER (OUTPATIENT)
Dept: VASCULAR SURGERY | Age: 63
Discharge: HOME | End: 2018-11-08
Attending: FAMILY MEDICINE
Payer: COMMERCIAL

## 2018-11-08 DIAGNOSIS — M79.662 PAIN OF LEFT CALF: ICD-10-CM

## 2018-11-08 PROCEDURE — 93971 EXTREMITY STUDY: CPT

## 2018-11-20 ENCOUNTER — DOCUMENTATION ONLY (OUTPATIENT)
Dept: ORTHOPEDIC SURGERY | Facility: CLINIC | Age: 63
End: 2018-11-20

## 2018-11-27 ENCOUNTER — OFFICE VISIT (OUTPATIENT)
Dept: VASCULAR SURGERY | Age: 63
End: 2018-11-27

## 2018-11-27 VITALS
RESPIRATION RATE: 17 BRPM | BODY MASS INDEX: 35.47 KG/M2 | DIASTOLIC BLOOD PRESSURE: 84 MMHG | WEIGHT: 226 LBS | HEIGHT: 67 IN | SYSTOLIC BLOOD PRESSURE: 126 MMHG | HEART RATE: 80 BPM

## 2018-11-27 DIAGNOSIS — Z86.711 HISTORY OF PULMONARY EMBOLISM: ICD-10-CM

## 2018-11-27 DIAGNOSIS — Z86.718 PERSONAL HISTORY OF DVT (DEEP VEIN THROMBOSIS): Primary | ICD-10-CM

## 2018-11-27 NOTE — PROGRESS NOTES
1. Have you been to an emergency room or urgent care clinic since your last visit? No    Hospitalized since your last visit? If yes, where, when, and reason for visit? NO  2. Have you seen or consulted any other health care providers outside of the [facility] since your last visit including any procedures, health maintenance items. If yes, where, when and reason for visit?  NO

## 2018-11-27 NOTE — PROGRESS NOTES
Cayden Diaz    Chief Complaint   Patient presents with   [facility] New Patient    Surgical Clearance     DR Shanice Aguilar. LEFT HIP       History and Physical    Ms Michael Sánchez is referred by ortho for evaluation of IVC filter prior to planned hip replacement in January    She has history of DVT and PE  She was treated with anticoagulation for a period of time afterwards but was able to discontinue and has had no recurrence  This was around [id number]    Given plans for joint replacement and her history prophylaxis with filter and anticoagulation are requested    Surgery date is for Tuesday January 15    Past Medical History:   Diagnosis Date    Arthritis     Breast cancer ([address]     Left    Cancer ([address] 2001    Lobular situ-Left breast    Chronic pain     DVT (deep venous thrombosis) (HCC)     GERD (gastroesophageal reflux disease)     Hx of radiation therapy     Hyperlipidemia     Hypertension     Low back pain potentially associated with radiculopathy     Menopause     Osteopenia     mild    Pulmonary embolism ([address]     Radiation therapy complication     Left breast    S/P hip replacement     Right hip    Trochanteric bursitis of right hip     Wears glasses      Patient Active Problem List   Diagnosis Code    Left Breast carcinoma 2001 C50.919    S/P hip replacement Z96.649    Hyperlipidemia E78.5    Acute pulmonary embolism ([address] I26.99    DVT (deep venous thrombosis) (HCC) I82.409    Troponin level elevated R74.8    Dyspnea R06.00    Pulmonary HTN ([address] I27.20    Severe obesity (BMI 35.0-39. 9) with comorbidity ([address] E66.01     Past Surgical History:   Procedure Laterality Date    HX HIP REPLACEMENT  2/12/2003    Right hip    HX MASTECTOMY  2001    Left partial    HX OTHER SURGICAL Bilateral 2014    bilat styes on eyes    HX TUBAL LIGATION  1983     Current Outpatient Medications   Medication Sig Dispense Refill    HYDROcodone-acetaminophen (NORCO) 7.5-325 mg per tablet Take 1 Tab by mouth every six (6) hours as needed for Pain. Max Daily Amount: 4 Tabs. 28 Tab 0    naproxen sodium (ALEVE) 220 mg tablet Take 220 mg by mouth two (2) times daily (with meals).  cetirizine (ZYRTEC) 10 mg tablet Take  by mouth daily.  guaiFENesin (MUCINEX) 1,200 mg TM12 ER tablet Take 1,200 mg by mouth two (2) times a day.  polyethylene glycol (MIRALAX) 17 gram packet Take 17 g by mouth daily.  albuterol (PROVENTIL, VENTOLIN) 90 mcg/actuation inhaler Take 1-2 Puffs by inhalation every four (4) hours as needed for Wheezing. 17 g 0    Inhalational Spacing Device (AEROCHAMBER) 1 Each by Does Not Apply route as needed. 1 Device 0    CALCIUM CITRATE/VITAMIN D3 (CALCITRATE-VITAMIN D PO) Take  by mouth.  acetaminophen (TYLENOL EXTRA STRENGTH) 500 mg tablet Take 500 mg by mouth every six (6) hours as needed.  atorvastatin (LIPITOR) 20 mg tablet Take 20 mg by mouth daily. Indications: HYPERCHOLESTEROLEMIA      diclofenac (VOLTAREN) 1 % gel Apply 4 g to affected area four (4) times daily. Maximum 16 grams per joint per day. Dispense 5 100 gram tubes 5 Each 0    meloxicam (MOBIC) 15 mg tablet 1 tab by mouth daily with food 30 Tab 2    gabapentin (NEURONTIN) 100 mg capsule Take  by mouth three (3) times daily.  carboxymethylcellulose sodium (REFRESH LIQUIGEL) 1 % dlgl Apply  to eye.  prednisoLONE acetate (PRED FORTE) 1 % ophthalmic suspension Administer 1 Drop to both eyes four (4) times daily.  warfarin (COUMADIN) 7.5 mg tablet Take 1 Tab by mouth daily. 7 Tab 0    lubiPROStone (AMITIZA) 24 mcg capsule Take 24 mcg by mouth.          No Known Allergies  Social History     Socioeconomic History    Marital status:      Spouse name: Not on file    Number of children: Not on file    Years of education: Not on file    Highest education level: Not on file   Social Needs    Financial resource strain: Not on file    Food insecurity - worry: Not on file    Food insecurity - inability: Not on file    Transportation needs - medical: Not on file   [employer] needs - non-medical: Not on file   Occupational History    Not on file   Tobacco Use    Smoking status: Former Smoker     Packs/day: 1.00     Years: 20.00     Pack years: 20.00     Types: Cigarettes    Smokeless tobacco: Never Used    Tobacco comment: QUIT APPROX 2000   Substance and Sexual Activity    Alcohol use: Yes     Comment: HOLIDAYS     Drug use: No    Sexual activity: Not on file   Other Topics Concern    Not on file   Social History Narrative    Not on file      Family History   Problem Relation Age of Onset    Asthma Other     Arthritis-osteo Other     Breast Cancer Maternal Aunt     Diabetes Mother     Heart Disease Mother     Alzheimer Mother     Diabetes Sister     Hypertension Sister     COPD Sister     Diabetes Brother     Hypertension Brother        Review of Systems    Review of Systems - History obtained from the patient  General ROS: negative  Psychological ROS: negative  Ophthalmic ROS: negative  Respiratory ROS: negative  Cardiovascular ROS: negative  Gastrointestinal ROS: negative  Musculoskeletal ROS: positive for - joint pain and joint stiffness  Neurological ROS: negative  Dermatological ROS: negative  Vascular ROS: negative    Physical Exam:    Visit Vitals  /84 (BP 1 Location: Left arm, BP Patient Position: Sitting)   Pulse 80   Resp 17   Ht 5' 7\" (1.702 m)   Wt 226 lb (102.5 kg)   BMI 35.40 kg/m²      Exam omitted in error    Impression and Plan:  1. Personal history of DVT (deep vein thrombosis)    2. History of pulmonary embolism      Discussed the consideration for filter given her increased risk, and for it to be retrievable (to remove about 6 weeks post op).  Will get duplex imaging prior to retrieval to ensure no DVT   Details for placement and then retrieval were discussed and Dr Latisha Alonzo was in to see patient as well  Will plan for placement on January 14, day prior to her joint surgery    WILBERTO Vela    Portions of this note have been created using voice recognition software.

## 2018-12-03 ENCOUNTER — DOCUMENTATION ONLY (OUTPATIENT)
Dept: ORTHOPEDIC SURGERY | Facility: CLINIC | Age: 63
End: 2018-12-03

## 2018-12-03 NOTE — PROGRESS NOTES
FMLA form completed, copy to scanning, and patient advised she may  at [facility] location.

## 2018-12-11 DIAGNOSIS — M16.11 PRIMARY LOCALIZED OSTEOARTHROSIS OF RIGHT HIP: ICD-10-CM

## 2018-12-11 DIAGNOSIS — Z01.818 PREOP EXAMINATION: Primary | ICD-10-CM

## 2019-01-02 ENCOUNTER — HOSPITAL ENCOUNTER (OUTPATIENT)
Dept: PREADMISSION TESTING | Age: 64
Discharge: HOME | End: 2019-01-02
Payer: COMMERCIAL

## 2019-01-02 ENCOUNTER — HOSPITAL ENCOUNTER (OUTPATIENT)
Dept: LAB | Age: 64
Discharge: HOME | End: 2019-01-02

## 2019-01-02 ENCOUNTER — HOSPITAL ENCOUNTER (OUTPATIENT)
Dept: GENERAL RADIOLOGY | Age: 64
Discharge: HOME | End: 2019-01-02
Payer: COMMERCIAL

## 2019-01-02 DIAGNOSIS — Z01.818 PREOP EXAMINATION: ICD-10-CM

## 2019-01-02 DIAGNOSIS — M16.11 PRIMARY LOCALIZED OSTEOARTHROSIS OF RIGHT HIP: ICD-10-CM

## 2019-01-02 LAB
ABO + RH BLD: NORMAL
ATRIAL RATE: 53 BPM
BLOOD GROUP ANTIBODIES SERPL: NORMAL
CALCULATED P AXIS, ECG09: 46 DEGREES
CALCULATED R AXIS, ECG10: 31 DEGREES
CALCULATED T AXIS, ECG11: 35 DEGREES
DIAGNOSIS, 93000: NORMAL
P-R INTERVAL, ECG05: 132 MS
Q-T INTERVAL, ECG07: 416 MS
QRS DURATION, ECG06: 94 MS
QTC CALCULATION (BEZET), ECG08: 390 MS
SENTARA SPECIMEN COL,SENBCF: NORMAL
SPECIMEN EXP DATE BLD: NORMAL
VENTRICULAR RATE, ECG03: 53 BPM

## 2019-01-02 PROCEDURE — 93005 ELECTROCARDIOGRAM TRACING: CPT

## 2019-01-02 PROCEDURE — 99001 SPECIMEN HANDLING PT-LAB: CPT

## 2019-01-02 PROCEDURE — 86900 BLOOD TYPING SEROLOGIC ABO: CPT

## 2019-01-02 PROCEDURE — 71046 X-RAY EXAM CHEST 2 VIEWS: CPT

## 2019-01-02 NOTE — PERIOP NOTES
Andriy Randhawa attended the Joint Replacement Pre-Operative class on 01/02/2019. Kee Monson has decided with their surgeon to have a left hip replacement to improve their mobility and decrease their pain. Topics discussed included surgery preparation, what to expect the day of surgery, medications, physical and occupational therapy, and discharge planning. It was discussed that this is considered an elective surgery and that prior to the surgery they need to make decisions such as arranging for help at home once they are discharged. Patient educated to get their DME (front wheeled walker/bedside commode/shower chair) before surgery and to bring the walker to hospital day of surgery. Patient agreed to get home ready for surgery and to have a ride arranged to go home. Kee Monson was given a hip patient education notebook to take home. Opportunity was given to ask questions and phone number of the Orthopaedic   was given for any questions or concerns that may arise later. Andriy Randhawa identified Araseli Black and Erinn as her coaches through surgical and recovery process.

## 2019-01-02 NOTE — PERIOP NOTES
Nilay Gongora was here today for her PAT appointment. Health assessment was completed and instructions given regarding NPO status, medications, Hibiclens washes, and removal of all jewelry and/or body piercing. Instructed patient not to remove the red Blood Bank armband that was placed on their arm when the Type and Screen was drawn. Instructed to bring walker to the hospital on the day of surgery so it can be properly adjusted by the physical therapist.  Jay Lapping was given to ask questions and all questions were answered. Understanding of instructions was verbalized.

## 2019-01-03 RX ORDER — LEVOFLOXACIN 750 MG/1
750 TABLET ORAL DAILY
Qty: 5 TAB | Refills: 0 | Status: SHIPPED | OUTPATIENT
Start: 2019-01-03 | End: 2019-01-16

## 2019-01-03 RX ORDER — PANTOPRAZOLE SODIUM 40 MG/1
40 TABLET, DELAYED RELEASE ORAL DAILY
COMMUNITY

## 2019-01-04 ENCOUNTER — HOSPITAL ENCOUNTER (OUTPATIENT)
Dept: BONE DENSITY | Age: 64
Discharge: HOME | End: 2019-01-04
Attending: FAMILY MEDICINE
Payer: COMMERCIAL

## 2019-01-04 ENCOUNTER — HOSPITAL ENCOUNTER (OUTPATIENT)
Dept: MAMMOGRAPHY | Age: 64
Discharge: HOME | End: 2019-01-04
Attending: FAMILY MEDICINE
Payer: COMMERCIAL

## 2019-01-04 DIAGNOSIS — Z13.820 SCREENING FOR OSTEOPOROSIS: ICD-10-CM

## 2019-01-04 DIAGNOSIS — Z12.31 VISIT FOR SCREENING MAMMOGRAM: ICD-10-CM

## 2019-01-04 PROCEDURE — 77080 DXA BONE DENSITY AXIAL: CPT

## 2019-01-04 PROCEDURE — 77063 BREAST TOMOSYNTHESIS BI: CPT

## 2019-01-08 ENCOUNTER — OFFICE VISIT (OUTPATIENT)
Dept: ORTHOPEDIC SURGERY | Facility: CLINIC | Age: 64
End: 2019-01-08

## 2019-01-08 DIAGNOSIS — M16.12 PRIMARY OSTEOARTHRITIS OF LEFT HIP: Primary | ICD-10-CM

## 2019-01-08 DIAGNOSIS — Z01.818 ENCOUNTER FOR PREOPERATIVE EXAMINATION FOR GENERAL SURGICAL PROCEDURE: ICD-10-CM

## 2019-01-09 ENCOUNTER — OFFICE VISIT (OUTPATIENT)
Dept: ORTHOPEDIC SURGERY | Facility: CLINIC | Age: 64
End: 2019-01-09

## 2019-01-09 VITALS
WEIGHT: 221 LBS | TEMPERATURE: 95.9 F | BODY MASS INDEX: 34.69 KG/M2 | SYSTOLIC BLOOD PRESSURE: 133 MMHG | HEIGHT: 67 IN | RESPIRATION RATE: 16 BRPM | OXYGEN SATURATION: 94 % | HEART RATE: 71 BPM | DIASTOLIC BLOOD PRESSURE: 69 MMHG

## 2019-01-09 DIAGNOSIS — M16.12 PRIMARY OSTEOARTHRITIS OF LEFT HIP: Primary | ICD-10-CM

## 2019-01-09 RX ORDER — ACETAMINOPHEN 325 MG/1
1000 TABLET ORAL ONCE
Status: CANCELLED | OUTPATIENT
Start: 2019-01-09 | End: 2019-01-09

## 2019-01-09 RX ORDER — PREGABALIN 25 MG/1
75 CAPSULE ORAL ONCE
Status: CANCELLED | OUTPATIENT
Start: 2019-01-09 | End: 2019-01-09

## 2019-01-09 RX ORDER — FLUCONAZOLE 150 MG/1
150 TABLET ORAL DAILY
Qty: 1 TAB | Refills: 0 | Status: SHIPPED | OUTPATIENT
Start: 2019-01-09 | End: 2019-01-10

## 2019-01-09 RX ORDER — WARFARIN 1 MG/1
10 TABLET ORAL ONCE
Status: CANCELLED | OUTPATIENT
Start: 2019-01-09 | End: 2019-01-09

## 2019-01-09 RX ORDER — CELECOXIB 100 MG/1
400 CAPSULE ORAL ONCE
Status: CANCELLED | OUTPATIENT
Start: 2019-01-09 | End: 2019-01-09

## 2019-01-09 RX ORDER — NITROFURANTOIN 25; 75 MG/1; MG/1
100 CAPSULE ORAL 2 TIMES DAILY
Qty: 10 CAP | Refills: 0 | Status: SHIPPED | OUTPATIENT
Start: 2019-01-09 | End: 2019-01-16

## 2019-01-09 NOTE — H&P
[address] [place] [address]  [phone]           HISTORY & PHYSICAL      Patient: Fransisco Saeed                MRN: [id number]       SSN: [id number]  YOB: 1955        AGE: 58 y.o. SEX: female  Body mass index is 34.61 kg/m². PCP: Paxton Lopez MD  01/09/19      CC: left hip end stage OA  Problem List Items Addressed This Visit     None            HPI:  The patient is a pleasant 58 y.o. whom has end stage OA of their Left hip and has failed conservative treatment including but not limited to NSAIDS, cortisone injections, viscosupplementation, PT, and pain medicine. Due to the current findings and affected activities of daily living, surgical intervention is indicated. The alternatives, risks, complications, as well as expected outcome were discussed. These include but are not limited to infection, blood loss, need for blood transfusion, neurovascular damage, DVT, PE,  post-op stiffness and pain, leg length discrepancy, dislocation, anesthetic complications, prothesis longevity, need for more surgery, MI, stroke, and even death. The patient understands and wishes to proceed with surgery.       Past Medical History:   Diagnosis Date    Arthritis     Breast cancer ([facility] [address]     Left    Cancer ([facility] [address] 2001    Lobular situ-Left breast    Chronic pain     DVT (deep venous thrombosis) (HCC)     GERD (gastroesophageal reflux disease)     Hx of radiation therapy     Hyperlipidemia     Hypertension     no meds    Low back pain potentially associated with radiculopathy     Menopause     Osteopenia     mild    Pulmonary embolism (HCC)     Radiation therapy complication     Left breast    S/P hip replacement     Right hip    Trochanteric bursitis of right hip     Wears glasses          Current Outpatient Medications:     nitrofurantoin, macrocrystal-monohydrate, (MACROBID) 100 mg capsule, Take 1 Cap by mouth two (2) times a day., Disp: 10 Cap, Rfl: 0    fluconazole (DIFLUCAN) 150 mg tablet, Take 1 Tab by mouth daily for 1 day. FDA advises cautious prescribing of oral fluconazole in pregnancy. , Disp: 1 Tab, Rfl: 0    pantoprazole (PROTONIX) 40 mg tablet, Take 40 mg by mouth daily. , Disp: , Rfl:     cyclobenzaprine HCl (FLEXERIL PO), Take 5 mg by mouth as needed. , Disp: , Rfl:     mometasone-formoterol (DULERA) 100-5 mcg/actuation HFA inhaler, Take 2 Puffs by inhalation two (2) times daily as needed. , Disp: , Rfl:     naproxen sodium (ALEVE) 220 mg tablet, Take 220 mg by mouth two (2) times daily (with meals). , Disp: , Rfl:     cetirizine (ZYRTEC) 10 mg tablet, Take  by mouth daily. , Disp: , Rfl:     guaiFENesin (MUCINEX) 1,200 mg TM12 ER tablet, Take 1,200 mg by mouth as needed. , Disp: , Rfl:     polyethylene glycol (MIRALAX) 17 gram packet, Take 17 g by mouth daily. , Disp: , Rfl:     albuterol (PROVENTIL, VENTOLIN) 90 mcg/actuation inhaler, Take 1-2 Puffs by inhalation every four (4) hours as needed for Wheezing., Disp: 17 g, Rfl: 0    CALCIUM CITRATE/VITAMIN D3 (CALCITRATE-VITAMIN D PO), Take  by mouth., Disp: , Rfl:     atorvastatin (LIPITOR) 20 mg tablet, Take 20 mg by mouth nightly., Disp: , Rfl:     levoFLOXacin (LEVAQUIN) 750 mg tablet, Take 1 Tab by mouth daily. , Disp: 5 Tab, Rfl: 0    HYDROcodone-acetaminophen (NORCO) 7.5-325 mg per tablet, Take 1 Tab by mouth every six (6) hours as needed for Pain. Max Daily Amount: 4 Tabs., Disp: 28 Tab, Rfl: 0    diclofenac (VOLTAREN) 1 % gel, Apply 4 g to affected area four (4) times daily. Maximum 16 grams per joint per day. Dispense 5 100 gram tubes, Disp: 5 Each, Rfl: 0    Inhalational Spacing Device (AEROCHAMBER), 1 Each by Does Not Apply route as needed. , Disp: 1 Device, Rfl: 0    lubiPROStone (AMITIZA) 24 mcg capsule, Take 24 mcg by mouth.  , Disp: , Rfl:     acetaminophen (TYLENOL EXTRA STRENGTH) 500 mg tablet, Take 500 mg by mouth every six (6) hours as needed. , Disp: , Rfl:     No Known Allergies    Social History     Socioeconomic History    Marital status:      Spouse name: Not on file    Number of children: Not on file    Years of education: Not on file    Highest education level: Not on file   Social Needs    Financial resource strain: Not on file    Food insecurity - worry: Not on file    Food insecurity - inability: Not on file    Transportation needs - medical: Not on file   [employer] needs - non-medical: Not on file   Occupational History    Not on file   Tobacco Use    Smoking status: Former Smoker     Packs/day: 1.00     Years: 20.00     Pack years: 20.00     Types: Cigarettes    Smokeless tobacco: Never Used    Tobacco comment: QUIT APPROX 2000   Substance and Sexual Activity    Alcohol use: No     Frequency: Never     Comment: HOLIDAYS     Drug use: No    Sexual activity: Not on file   Other Topics Concern    Not on file   Social History Narrative    Not on file       Past Surgical History:   Procedure Laterality Date    HX HIP REPLACEMENT  2/12/2003    Right hip    HX MASTECTOMY  2001    Left partial    HX OTHER SURGICAL Bilateral 2014    bilat styes on eyes    HX TUBAL LIGATION  1983       Family History:  Non-contributory. PE:  Visit Vitals  /69 (BP 1 Location: Left arm, BP Patient Position: Sitting)   Pulse 71   Temp 95.9 °F (35.5 °C) (Oral)   Resp 16   Ht 5' 7\" (1.702 m)   Wt 221 lb (100.2 kg)   SpO2 94%   BMI 34.61 kg/m²     A&O X3, NAD, well develop, well nourished  Heart: S1-S2, rrr  Lungs: CTA bilat  Abd: soft, nt, nt, + bs in all quadrants  Ext:  Pos distal pulses to DP, PT  Leg lengths show the left LE to be slightly shorter grossly sitting in the chair    X-ray: left hip shows end stage OA    Labs: labs were reviewed and wnl.  ua pos, treated with levaquin/macrobid    A:  Left  hip end stage OA    P:  At this point we will move forward with surgery.   Again, the alternatives, risks, complications, as well as expected outcome were discussed and the patient wishes to proceed with surgery. Pt has been instructed to stop aspirin, nsaids, rheumatologic medications and blood thinners. They have also been instructed to continue on any heart and bp meds and to take them the morning of surgery with sips of water. Lateral approach. The patient will require in-patient admission. Admission as an in-patient is reasonable and necessary due to increased risk of surgery due to the factors indicated as well as the possible need for prolonged in-hospital or skilled post-acute care in order to improve this patient's functional ability.         Acosta Carver

## 2019-01-09 NOTE — H&P (VIEW-ONLY)
[address], [address] [facility] [address] 
[phone] HISTORY & PHYSICAL Patient: Facundo Frost                MRN: [id number]       SSN: [id number] YOB: 1955        AGE: 58 y.o. SEX: female Body mass index is 34.61 kg/m². PCP: Carlos Zuniga MD 
01/09/19 CC: left hip end stage OA Problem List Items Addressed This Visit None HPI:  The patient is a pleasant 58 y.o. whom has end stage OA of their Left hip and has failed conservative treatment including but not limited to NSAIDS, cortisone injections, viscosupplementation, PT, and pain medicine. Due to the current findings and affected activities of daily living, surgical intervention is indicated. The alternatives, risks, complications, as well as expected outcome were discussed. These include but are not limited to infection, blood loss, need for blood transfusion, neurovascular damage, DVT, PE,  post-op stiffness and pain, leg length discrepancy, dislocation, anesthetic complications, prothesis longevity, need for more surgery, MI, stroke, and even death. The patient understands and wishes to proceed with surgery. Past Medical History:  
Diagnosis Date  Arthritis  Breast cancer ([address] Left  Cancer ([address] 2001 Lobular situ-Left breast  
 Chronic pain  DVT (deep venous thrombosis) ([address]  GERD (gastroesophageal reflux disease)  Hx of radiation therapy  Hyperlipidemia  Hypertension   
 no meds  Low back pain potentially associated with radiculopathy  Menopause  Osteopenia   
 mild  Pulmonary embolism ([address]  Radiation therapy complication Left breast  
 S/P hip replacement Right hip  Trochanteric bursitis of right hip  Wears glasses Current Outpatient Medications:  
  nitrofurantoin, macrocrystal-monohydrate, (MACROBID) 100 mg capsule, Take 1 Cap by mouth two (2) times a day., Disp: 10 Cap, Rfl: 0 
  fluconazole (DIFLUCAN) 150 mg tablet, Take 1 Tab by mouth daily for 1 day. FDA advises cautious prescribing of oral fluconazole in pregnancy. , Disp: 1 Tab, Rfl: 0 
  pantoprazole (PROTONIX) 40 mg tablet, Take 40 mg by mouth daily. , Disp: , Rfl:  
  cyclobenzaprine HCl (FLEXERIL PO), Take 5 mg by mouth as needed. , Disp: , Rfl:  
  mometasone-formoterol (DULERA) 100-5 mcg/actuation HFA inhaler, Take 2 Puffs by inhalation two (2) times daily as needed. , Disp: , Rfl:  
  naproxen sodium (ALEVE) 220 mg tablet, Take 220 mg by mouth two (2) times daily (with meals). , Disp: , Rfl:  
  cetirizine (ZYRTEC) 10 mg tablet, Take  by mouth daily. , Disp: , Rfl:  
  guaiFENesin (MUCINEX) 1,200 mg TM12 ER tablet, Take 1,200 mg by mouth as needed. , Disp: , Rfl:  
  polyethylene glycol (MIRALAX) 17 gram packet, Take 17 g by mouth daily. , Disp: , Rfl:  
  albuterol (PROVENTIL, VENTOLIN) 90 mcg/actuation inhaler, Take 1-2 Puffs by inhalation every four (4) hours as needed for Wheezing., Disp: 17 g, Rfl: 0 
  CALCIUM CITRATE/VITAMIN D3 (CALCITRATE-VITAMIN D PO), Take  by mouth., Disp: , Rfl:  
  atorvastatin (LIPITOR) 20 mg tablet, Take 20 mg by mouth nightly., Disp: , Rfl:  
  levoFLOXacin (LEVAQUIN) 750 mg tablet, Take 1 Tab by mouth daily. , Disp: 5 Tab, Rfl: 0 
  HYDROcodone-acetaminophen (NORCO) 7.5-325 mg per tablet, Take 1 Tab by mouth every six (6) hours as needed for Pain. Max Daily Amount: 4 Tabs., Disp: 28 Tab, Rfl: 0 
  diclofenac (VOLTAREN) 1 % gel, Apply 4 g to affected area four (4) times daily. Maximum 16 grams per joint per day. Dispense 5 100 gram tubes, Disp: 5 Each, Rfl: 0 
  Inhalational Spacing Device (AEROCHAMBER), 1 Each by Does Not Apply route as needed. , Disp: 1 Device, Rfl: 0 
  lubiPROStone (AMITIZA) 24 mcg capsule, Take 24 mcg by mouth.  , Disp: , Rfl:  
  acetaminophen (TYLENOL EXTRA STRENGTH) 500 mg tablet, Take 500 mg by mouth every six (6) hours as needed. , Disp: , Rfl:  
 
No Known Allergies Social History Socioeconomic History  Marital status:  Spouse name: Not on file  Number of children: Not on file  Years of education: Not on file  Highest education level: Not on file Social Needs  Financial resource strain: Not on file  Food insecurity - worry: Not on file  Food insecurity - inability: Not on file  Transportation needs - medical: Not on file  Transportation needs - non-medical: Not on file Occupational History  Not on file Tobacco Use  Smoking status: Former Smoker Packs/day: 1.00 Years: 20.00 Pack years: 20.00 Types: Cigarettes  Smokeless tobacco: Never Used  Tobacco comment: QUIT APPROX 2000 Substance and Sexual Activity  Alcohol use: No  
  Frequency: Never Comment: HOLIDAYS  Drug use: No  
 Sexual activity: Not on file Other Topics Concern  Not on file Social History Narrative  Not on file Past Surgical History:  
Procedure Laterality Date  HX HIP REPLACEMENT  2/12/2003 Right hip  HX MASTECTOMY  2001 Left partial  
 HX OTHER SURGICAL Bilateral 2014  
 bilat styes on eyes [place] Family History:  Non-contributory. PE: 
Visit Vitals /69 (BP 1 Location: Left arm, BP Patient Position: Sitting) Pulse 71 Temp 95.9 °F (35.5 °C) (Oral) Resp 16 Ht 5' 7\" (1.702 m) Wt 221 lb (100.2 kg) SpO2 94% BMI 34.61 kg/m² A&O X3, NAD, well develop, well nourished Heart: S1-S2, rrr 
Lungs: CTA bilat Abd: soft, nt, nt, + bs in all quadrants Ext:  Pos distal pulses to DP, PT Leg lengths show the left LE to be slightly shorter grossly sitting in the chair X-ray: left hip shows end stage OA Labs: labs were reviewed and wnl.  ua pos, treated with levaquin/macrobid A:  Left  hip end stage OA 
 
P:  At this point we will move forward with surgery.   Again, the alternatives, risks, complications, as well as expected outcome were discussed and the patient wishes to proceed with surgery. Pt has been instructed to stop aspirin, nsaids, rheumatologic medications and blood thinners. They have also been instructed to continue on any heart and bp meds and to take them the morning of surgery with sips of water. Lateral approach. The patient will require in-patient admission. Admission as an in-patient is reasonable and necessary due to increased risk of surgery due to the factors indicated as well as the possible need for prolonged in-hospital or skilled post-acute care in order to improve this patient's functional ability. Mandeep Bedolla

## 2019-01-11 RX ORDER — SODIUM CHLORIDE 0.9 % (FLUSH) 0.9 %
5-40 SYRINGE (ML) INJECTION AS NEEDED
Status: CANCELLED | OUTPATIENT
Start: 2019-01-11

## 2019-01-11 RX ORDER — SODIUM CHLORIDE 0.9 % (FLUSH) 0.9 %
5-40 SYRINGE (ML) INJECTION EVERY 8 HOURS
Status: CANCELLED | OUTPATIENT
Start: 2019-01-11

## 2019-01-11 NOTE — H&P
History and Physical   
Ms Vicki Jolley is referred by ortho for evaluation of IVC filter prior to planned hip replacement in January 
  She has history of DVT and PE She was treated with anticoagulation for a period of time afterwards but was able to discontinue and has had no recurrence This was around [id number] 
  
Given plans for joint replacement and her history prophylaxis with filter and anticoagulation are requested 
  
Surgery date is for Tuesday January 15 
  
    
Past Medical History:  
Diagnosis Date  Arthritis    
 Breast cancer (HCC)    
  Left  Cancer ([facility] 2001  
  Lobular situ-Left breast  
 Chronic pain    
 DVT (deep venous thrombosis) (HCC)    
 GERD (gastroesophageal reflux disease)    
 Hx of radiation therapy    
 Hyperlipidemia    
 Hypertension    
 Low back pain potentially associated with radiculopathy    
 Menopause    
 Osteopenia    
  mild  Pulmonary embolism (HCC)    
 Radiation therapy complication    
  Left breast  
 S/P hip replacement    
  Right hip  Trochanteric bursitis of right hip    
 Wears glasses    
  
    
Patient Active Problem List  
Diagnosis Code  Left Breast carcinoma 2001 C50.919  S/P hip replacement Z96.649  Hyperlipidemia E78.5  Acute pulmonary embolism (HCC) I26.99  
 DVT (deep venous thrombosis) (HCC) I82.409  Troponin level elevated R74.8  Dyspnea R06.00  
 Pulmonary HTN (HCC) I27.20  Severe obesity (BMI 35.0-39. 9) with comorbidity ([facility] E66.01  
  
     
Past Surgical History:  
Procedure Laterality Date  HX HIP REPLACEMENT   2/12/2003  
  Right hip  HX MASTECTOMY   2001  
  Left partial  
 HX OTHER SURGICAL Bilateral 2014  
  bilat styes on eyes  HX TUBAL LIGATION   1983  
  
      
Current Outpatient Medications Medication Sig Dispense Refill  
 HYDROcodone-acetaminophen (NORCO) 7.5-325 mg per tablet Take 1 Tab by mouth every six (6) hours as needed for Pain. Max Daily Amount: 4 Tabs.  28 Tab 0  
  naproxen sodium (ALEVE) 220 mg tablet Take 220 mg by mouth two (2) times daily (with meals).      
 cetirizine (ZYRTEC) 10 mg tablet Take  by mouth daily.      
 guaiFENesin (MUCINEX) 1,200 mg TM12 ER tablet Take 1,200 mg by mouth two (2) times a day.      
 polyethylene glycol (MIRALAX) 17 gram packet Take 17 g by mouth daily.      
 albuterol (PROVENTIL, VENTOLIN) 90 mcg/actuation inhaler Take 1-2 Puffs by inhalation every four (4) hours as needed for Wheezing. 17 g 0  
 Inhalational Spacing Device (AEROCHAMBER) 1 Each by Does Not Apply route as needed. 1 Device 0  
 CALCIUM CITRATE/VITAMIN D3 (CALCITRATE-VITAMIN D PO) Take  by mouth.      
 acetaminophen (TYLENOL EXTRA STRENGTH) 500 mg tablet Take 500 mg by mouth every six (6) hours as needed.        
 atorvastatin (LIPITOR) 20 mg tablet Take 20 mg by mouth daily. Indications: HYPERCHOLESTEROLEMIA      
 diclofenac (VOLTAREN) 1 % gel Apply 4 g to affected area four (4) times daily. Maximum 16 grams per joint per day. Dispense 5 100 gram tubes 5 Each 0  
 meloxicam (MOBIC) 15 mg tablet 1 tab by mouth daily with food 30 Tab 2  
 gabapentin (NEURONTIN) 100 mg capsule Take  by mouth three (3) times daily.      
 carboxymethylcellulose sodium (REFRESH LIQUIGEL) 1 % dlgl Apply  to eye.      
 prednisoLONE acetate (PRED FORTE) 1 % ophthalmic suspension Administer 1 Drop to both eyes four (4) times daily.      
 warfarin (COUMADIN) 7.5 mg tablet Take 1 Tab by mouth daily. 7 Tab 0  
 lubiPROStone (AMITIZA) 24 mcg capsule Take 24 mcg by mouth.        
  
No Known Allergies Social History  
  
     
Socioeconomic History  Marital status:   
    Spouse name: Not on file  Number of children: Not on file  Years of education: Not on file  Highest education level: Not on file Social Needs  Financial resource strain: Not on file  Food insecurity - worry: Not on file  Food insecurity - inability: Not on file  Transportation needs - medical: Not on file  Transportation needs - non-medical: Not on file Occupational History  Not on file Tobacco Use  Smoking status: Former Smoker  
    Packs/day: 1.00  
    Years: 20.00  
    Pack years: 20.00  
    Types: Cigarettes  Smokeless tobacco: Never Used  Tobacco comment: QUIT APPROX 2000 Substance and Sexual Activity  Alcohol use: Yes  
    Comment: HOLIDAYS  Drug use: No  
 Sexual activity: Not on file Other Topics Concern  Not on file Social History Narrative  Not on file Family History Problem Relation Age of Onset  Asthma Other    
 Arthritis-osteo Other    
 Breast Cancer Maternal Aunt    
 Diabetes Mother    
 Heart Disease Mother    
 Alzheimer Mother    
 Diabetes Sister    
 Hypertension Sister    
 COPD Sister    
 Diabetes Brother    
 Hypertension Brother    
  
  
Review of Systems Review of Systems - History obtained from the patient General ROS: negative Psychological ROS: negative Ophthalmic ROS: negative Respiratory ROS: negative Cardiovascular ROS: negative Gastrointestinal ROS: negative Musculoskeletal ROS: positive for - joint pain and joint stiffness Neurological ROS: negative Dermatological ROS: negative Vascular ROS: negative 
  
Physical Exam:   
Visit Vitals /84 (BP 1 Location: Left arm, BP Patient Position: Sitting) Pulse 80 Resp 17 Ht 5' 7\" (1.702 m) Wt 226 lb (102.5 kg) BMI 35.40 kg/m² Exam omitted in error 
  
Impression and Plan: 1. Personal history of DVT (deep vein thrombosis) 2. History of pulmonary embolism   
  
Discussed the consideration for filter given her increased risk, and for it to be retrievable (to remove about 6 weeks post op). Will get duplex imaging prior to retrieval to ensure no DVT Details for placement and then retrieval were discussed and Dr Erin Copoer was in to see patient as well Will plan for placement on January 14, day prior to her joint surgery

## 2019-01-14 ENCOUNTER — HOSPITAL ENCOUNTER (OUTPATIENT)
Age: 64
Setting detail: OUTPATIENT SURGERY
Discharge: HOME | End: 2019-01-14
Attending: SURGERY | Admitting: SURGERY
Payer: COMMERCIAL

## 2019-01-14 ENCOUNTER — ANESTHESIA EVENT (OUTPATIENT)
Dept: SURGERY | Age: 64
End: 2019-01-14
Payer: COMMERCIAL

## 2019-01-14 VITALS
RESPIRATION RATE: 12 BRPM | HEIGHT: 68 IN | WEIGHT: 222 LBS | HEART RATE: 52 BPM | OXYGEN SATURATION: 98 % | DIASTOLIC BLOOD PRESSURE: 73 MMHG | BODY MASS INDEX: 33.65 KG/M2 | SYSTOLIC BLOOD PRESSURE: 130 MMHG

## 2019-01-14 DIAGNOSIS — Z86.718 PERSONAL HISTORY OF VENOUS THROMBOSIS AND EMBOLISM: ICD-10-CM

## 2019-01-14 PROCEDURE — 77030004530 HC CATH ANGI DX IMGR BSC -A: Performed by: SURGERY

## 2019-01-14 PROCEDURE — C1894 INTRO/SHEATH, NON-LASER: HCPCS | Performed by: SURGERY

## 2019-01-14 PROCEDURE — C1769 GUIDE WIRE: HCPCS | Performed by: SURGERY

## 2019-01-14 PROCEDURE — 37191 INS ENDOVAS VENA CAVA FILTR: CPT | Performed by: SURGERY

## 2019-01-14 PROCEDURE — 76937 US GUIDE VASCULAR ACCESS: CPT | Performed by: SURGERY

## 2019-01-14 PROCEDURE — C1880 VENA CAVA FILTER: HCPCS | Performed by: SURGERY

## 2019-01-14 PROCEDURE — 74011636320 HC RX REV CODE- 636/320: Performed by: SURGERY

## 2019-01-14 PROCEDURE — 74011250636 HC RX REV CODE- 250/636: Performed by: SURGERY

## 2019-01-14 DEVICE — FILTER VENA CAVA 7FR 79X65CM -- IGTCFS-65-1-FEM-CELECT-PT: Type: IMPLANTABLE DEVICE | Status: FUNCTIONAL

## 2019-01-14 RX ORDER — LIDOCAINE HYDROCHLORIDE 10 MG/ML
INJECTION, SOLUTION EPIDURAL; INFILTRATION; INTRACAUDAL; PERINEURAL AS NEEDED
Status: DISCONTINUED | OUTPATIENT
Start: 2019-01-14 | End: 2019-01-14 | Stop reason: HOSPADM

## 2019-01-14 RX ORDER — IODIXANOL 320 MG/ML
INJECTION, SOLUTION INTRAVASCULAR AS NEEDED
Status: DISCONTINUED | OUTPATIENT
Start: 2019-01-14 | End: 2019-01-14 | Stop reason: HOSPADM

## 2019-01-14 NOTE — Clinical Note
TRANSFER - IN REPORT:  
 
Verbal report received from: Darrel Arrieta RN. Report consisted of patient's Situation, Background, Assessment and  
Recommendations(SBAR). Opportunity for questions and clarification was provided. Assessment completed upon patient's arrival to unit and care assumed. Patient transported with a Cardiac Cath Tech / Patient Care Tech.

## 2019-01-14 NOTE — OP NOTES
[facility]  OPERATIVE REPORT    Jonatan Gil  MR#: [id number]  : 1955  ACCOUNT #: [de-identified]   DATE OF SERVICE: 2019    SURGEON:  Seema Urias DO     PREOPERATIVE DIAGNOSES:  Deep vein thrombosis, total joint replacement, thromboembolic protection. POSTOPERATIVE DIAGNOSES:  Deep vein thrombosis, total joint replacement, thromboembolic protection. PROCEDURES PERFORMED:  Right femoral ultrasound with interpretation, inferior venacavogram with interpretation, deployment of inferior vena cava filter. COMPLICATIONS:  Zero. ESTIMATED BLOOD LOSS:  Zero. CONDITION:  The patient is stable. SPECIMEN REMOVED:  None. IMPLANTS:  Cook Celect filter    ASSISTANT:  None. ANESTHESIA:  Local.    DETAILS OF PROCEDURE:  The patient is a 27-year-old who is getting a total joint replacement tomorrow. She has had a history of DVT and thromboembolism. We placed a filter for protection from thromboembolism. She is brought to the cath lab. She was placed on the table in supine position. Her right groin was prepped and draped in the usual standard fashion. I did an ultrasound. The artery was pulsatile, appeared normal.  The vein was compressible. There was no DVT at this location. I did a single anterior puncture into the vein without any difficulty and placed a Magic Torque wire into the inferior vena cava. I placed a 6-Chinese sheath, did an inferior venacavogram via the sheath. The right iliac vein is patent. The cava is appropriate in size and patent. There is no thrombus. The bifurcation and the renal veins are noted. I brought a delivery sheath up into the inferior vena cava and deployed the [facility] filter below the renal veins and above the bifurcation. There is no migration. It is in good position. I removed the sheath, held direct pressure for hemostasis. She was transferred back to Recovery.   She can go home today and she will come back for her surgery tomorrow.       DO GAUTAM Landry / RANDA  D: 01/14/2019 11:15     T: 01/14/2019 13:05  JOB #: [id number]

## 2019-01-14 NOTE — Clinical Note
Bilateral groin prepped with ChloraPrep and draped. Wet prep solution applied at: 803. Wet prep solution dried at: 806. Wet prep elapsed drying time: 3 mins.

## 2019-01-14 NOTE — DISCHARGE INSTRUCTIONS
[address] INFERIOR VENA CAVA FILTER DISCHARGE INSTRUCTIONS    General Information:      Your doctor has asked us to put a filter in your inferior vena cava (the largest vein in your abdomen) to catch blood clots from moving from your legs into your lungs, heart, and brain. The filters are fixed in the vessel, and in most cases are permanent. Your doctor may also put you on blood thinners to prevent the formation of blood clots. This filter is sometimes placed prior to a major surgery if you have a history of blood clots. Home Care Instructions: You can resume your regular diet and medication regimen. Do not drink alcohol, drive, or make any important legal decisions in the next 24 hours. Do not lift anything heavier than a gallon of milk, or do anything strenuous for the next 24 hours. You will notice a dressing on your neck or groin. This was the site of the insertion for the procedure. Keep the site covered until the puncture site has totally healed. You make take a shower with the bandage, but do cover it with plastic wrap. Do not immerse yourself in water until the wound has totally healed. After your puncture wound heals, there is no special care that is needed. You will receive a card for your wallet that has information on it about the filter. You will need to show this to any physician that takes care of you. Keep it with your 's license and insurance cards. It is important for your doctor to know what kind of filter you have, as there are several brands. You may resume your normal level of activity slowly, after about one week of light activity. Call If:     You should call your Physician and/or the Radiology Nurse if you have any bleeding other than a small spot on your bandage. Call if you have any signs of infection, fever, swelling, or increased pain at the site. Call if you have any questions of how to take care of your wound.     Follow-Up Instructions: Please see your ordering doctor as he/she has requested. DISCHARGE SUMMARY from Nurse    PATIENT INSTRUCTIONS:    After general anesthesia or intravenous sedation, for 24 hours or while taking prescription Narcotics:  · Limit your activities  · Do not drive and operate hazardous machinery  · Do not make important personal or business decisions  · Do  not drink alcoholic beverages  · If you have not urinated within 8 hours after discharge, please contact your surgeon on call. Report the following to your surgeon:  · Excessive pain, swelling, redness or odor of or around the surgical area  · Temperature over 100.5  · Nausea and vomiting lasting longer than 4 hours or if unable to take medications  · Any signs of decreased circulation or nerve impairment to extremity: change in color, persistent  numbness, tingling, coldness or increase pain  · Any questions        If you experience any of the following symptoms     *  Please give a list of your current medications to your Primary Care Provider. *  Please update this list whenever your medications are discontinued, doses are      changed, or new medications (including over-the-counter products) are added. *  Please carry medication information at all times in case of emergency situations. These are general instructions for a healthy lifestyle:    No smoking/ No tobacco products/ Avoid exposure to second hand smoke  Surgeon General's Warning:  Quitting smoking now greatly reduces serious risk to your health.     Obesity, smoking, and sedentary lifestyle greatly increases your risk for illness    A healthy diet, regular physical exercise & weight monitoring are important for maintaining a healthy lifestyle    You may be retaining fluid if you have a history of heart failure or if you experience any of the following symptoms:  Weight gain of 3 pounds or more overnight or 5 pounds in a week, increased swelling in our hands or feet or shortness of breath while lying flat in bed. Please call your doctor as soon as you notice any of these symptoms; do not wait until your next office visit. Recognize signs and symptoms of STROKE:    F-face looks uneven    A-arms unable to move or move unevenly    S-speech slurred or non-existent    T-time-call 911 as soon as signs and symptoms begin-DO NOT go       Back to bed or wait to see if you get better-TIME IS BRAIN. Warning Signs of HEART ATTACK     Call 911 if you have these symptoms:   Chest discomfort. Most heart attacks involve discomfort in the center of the chest that lasts more than a few minutes, or that goes away and comes back. It can feel like uncomfortable pressure, squeezing, fullness, or pain.  Discomfort in other areas of the upper body. Symptoms can include pain or discomfort in one or both arms, the back, neck, jaw, or stomach.  Shortness of breath with or without chest discomfort.  Other signs may include breaking out in a cold sweat, nausea, or lightheadedness. Don't wait more than five minutes to call 911 - MINUTES MATTER! Fast action can save your life. Calling 911 is almost always the fastest way to get lifesaving treatment. Emergency Medical Services staff can begin treatment when they arrive -- up to an hour sooner than if someone gets to the hospital by car. The discharge information has been reviewed with the patient and caregiver. The patient and caregiver verbalized understanding. Discharge medications reviewed with the patient and caregiver and appropriate educational materials and side effects teaching were provided.   ___________________________________________________________________________________________________________________________________    To Reach Us:        Patient Signature:  Date: 1/14/2019  Discharging Nurse: Elza Lynn RN

## 2019-01-14 NOTE — PROGRESS NOTES
Cath holding summary Patient escorted to cath holding from waiting area ambulatory, alert and oriented x 4, voicing no complaints. Changed into gown and placed on monitor. NPO since MN. Lab results, med rec and H&P reviewed on chart. PIV x 1 inserted without difficulty. Family to bedside. 0818 patient arrived to cath holding awake and alert, vital signs stable right groin site clean dry and intact with no hematoma present, no C/O pain, will continue to monitor. 0940 discharged home with family,  vital signs stable right groin site clean dry and intact with no hematoma present, no C/O pain

## 2019-01-14 NOTE — Clinical Note
TRANSFER - OUT REPORT:  
 
Verbal report given to: Luz Elena Massey RN. Report consisted of patient's Situation, Background, Assessment and  
Recommendations(SBAR). Opportunity for questions and clarification was provided. Patient transported with a Cardiac Cath Tech / Patient Care Tech. Patient transported to: [address]

## 2019-01-14 NOTE — Clinical Note
Contrast Dose Calculator:  
Patient's age: 58.  
Patient's sex: Female. Patient weight (kg) = 101. Creatinine level (mg/dL) = 0.8. Creatinine clearance (mL/min): 113. Contrast concentration (mg/mL) = 320. MACD = 300 mL. Max Contrast dose per Creatinine Cl calculator = 254.25 mL.

## 2019-01-15 ENCOUNTER — HOSPITAL ENCOUNTER (OUTPATIENT)
Age: 64
LOS: 1 days | Discharge: HOME HEALTH | End: 2019-01-16
Attending: ORTHOPAEDIC SURGERY
Payer: COMMERCIAL

## 2019-01-15 ENCOUNTER — ANESTHESIA (OUTPATIENT)
Dept: SURGERY | Age: 64
End: 2019-01-15
Payer: COMMERCIAL

## 2019-01-15 ENCOUNTER — APPOINTMENT (OUTPATIENT)
Dept: GENERAL RADIOLOGY | Age: 64
End: 2019-01-15
Attending: PHYSICIAN ASSISTANT
Payer: COMMERCIAL

## 2019-01-15 DIAGNOSIS — M16.10 HIP ARTHRITIS: Primary | ICD-10-CM

## 2019-01-15 PROBLEM — Z86.711 HISTORY OF PULMONARY EMBOLISM: Status: ACTIVE | Noted: 2019-01-15

## 2019-01-15 PROCEDURE — 74011250636 HC RX REV CODE- 250/636: Performed by: PHYSICIAN ASSISTANT

## 2019-01-15 PROCEDURE — 74011000250 HC RX REV CODE- 250: Performed by: PHYSICIAN ASSISTANT

## 2019-01-15 PROCEDURE — 94761 N-INVAS EAR/PLS OXIMETRY MLT: CPT

## 2019-01-15 PROCEDURE — 76060000034 HC ANESTHESIA 1.5 TO 2 HR: Performed by: ORTHOPAEDIC SURGERY

## 2019-01-15 PROCEDURE — 77030036660: Performed by: ORTHOPAEDIC SURGERY

## 2019-01-15 PROCEDURE — L1830 KO IMMOB CANVAS LONG PRE OTS: HCPCS

## 2019-01-15 PROCEDURE — 97162 PT EVAL MOD COMPLEX 30 MIN: CPT

## 2019-01-15 PROCEDURE — 77030026438 HC STYL ET INTUB CARD -A: Performed by: ANESTHESIOLOGY

## 2019-01-15 PROCEDURE — 88304 TISSUE EXAM BY PATHOLOGIST: CPT

## 2019-01-15 PROCEDURE — 74011250637 HC RX REV CODE- 250/637: Performed by: PHYSICIAN ASSISTANT

## 2019-01-15 PROCEDURE — 76010000153 HC OR TIME 1.5 TO 2 HR: Performed by: ORTHOPAEDIC SURGERY

## 2019-01-15 PROCEDURE — 74011000250 HC RX REV CODE- 250: Performed by: ORTHOPAEDIC SURGERY

## 2019-01-15 PROCEDURE — 77030020782 HC GWN BAIR PAWS FLX 3M -B: Performed by: ORTHOPAEDIC SURGERY

## 2019-01-15 PROCEDURE — 77030006835 HC BLD SAW SAG STRY -B: Performed by: ORTHOPAEDIC SURGERY

## 2019-01-15 PROCEDURE — 94640 AIRWAY INHALATION TREATMENT: CPT

## 2019-01-15 PROCEDURE — C9290 INJ, BUPIVACAINE LIPOSOME: HCPCS | Performed by: PHYSICIAN ASSISTANT

## 2019-01-15 PROCEDURE — 74011000258 HC RX REV CODE- 258: Performed by: ORTHOPAEDIC SURGERY

## 2019-01-15 PROCEDURE — 77030019557 HC ELECTRD VES SEAL MEDT -F: Performed by: ORTHOPAEDIC SURGERY

## 2019-01-15 PROCEDURE — 94664 DEMO&/EVAL PT USE INHALER: CPT

## 2019-01-15 PROCEDURE — 73502 X-RAY EXAM HIP UNI 2-3 VIEWS: CPT

## 2019-01-15 PROCEDURE — 77030031139 HC SUT VCRL2 J&J -A: Performed by: ORTHOPAEDIC SURGERY

## 2019-01-15 PROCEDURE — 74011250636 HC RX REV CODE- 250/636: Performed by: ORTHOPAEDIC SURGERY

## 2019-01-15 PROCEDURE — 74011250636 HC RX REV CODE- 250/636

## 2019-01-15 PROCEDURE — 74011250637 HC RX REV CODE- 250/637: Performed by: ORTHOPAEDIC SURGERY

## 2019-01-15 PROCEDURE — 76210000016 HC OR PH I REC 1 TO 1.5 HR: Performed by: ORTHOPAEDIC SURGERY

## 2019-01-15 PROCEDURE — 74011250637 HC RX REV CODE- 250/637: Performed by: HOSPITALIST

## 2019-01-15 PROCEDURE — 77030010545

## 2019-01-15 PROCEDURE — 77030027138 HC INCENT SPIROMETER -A

## 2019-01-15 PROCEDURE — 77030018836 HC SOL IRR NACL ICUM -A: Performed by: ORTHOPAEDIC SURGERY

## 2019-01-15 PROCEDURE — 97530 THERAPEUTIC ACTIVITIES: CPT

## 2019-01-15 PROCEDURE — 77030012935 HC DRSG AQUACEL BMS -B: Performed by: ORTHOPAEDIC SURGERY

## 2019-01-15 PROCEDURE — 77030003666 HC NDL SPINAL BD -A: Performed by: ORTHOPAEDIC SURGERY

## 2019-01-15 PROCEDURE — 74011250636 HC RX REV CODE- 250/636: Performed by: NURSE ANESTHETIST, CERTIFIED REGISTERED

## 2019-01-15 PROCEDURE — 77030008683 HC TU ET CUF COVD -A: Performed by: ANESTHESIOLOGY

## 2019-01-15 PROCEDURE — 74011000258 HC RX REV CODE- 258: Performed by: PHYSICIAN ASSISTANT

## 2019-01-15 PROCEDURE — 77030020294 HC ABD PLLW HIP DERY -B: Performed by: ORTHOPAEDIC SURGERY

## 2019-01-15 PROCEDURE — 77030012890: Performed by: ORTHOPAEDIC SURGERY

## 2019-01-15 PROCEDURE — 77030018835 HC SOL IRR LR ICUM -A: Performed by: ORTHOPAEDIC SURGERY

## 2019-01-15 PROCEDURE — 77030013708 HC HNDPC SUC IRR PULS STRY –B: Performed by: ORTHOPAEDIC SURGERY

## 2019-01-15 PROCEDURE — C1776 JOINT DEVICE (IMPLANTABLE): HCPCS | Performed by: ORTHOPAEDIC SURGERY

## 2019-01-15 PROCEDURE — 88311 DECALCIFY TISSUE: CPT

## 2019-01-15 PROCEDURE — 74011000250 HC RX REV CODE- 250

## 2019-01-15 PROCEDURE — 64520 N BLOCK LUMBAR/THORACIC: CPT | Performed by: ANESTHESIOLOGY

## 2019-01-15 PROCEDURE — 77030002933 HC SUT MCRYL J&J -A: Performed by: ORTHOPAEDIC SURGERY

## 2019-01-15 PROCEDURE — 77030011943

## 2019-01-15 PROCEDURE — 74011250637 HC RX REV CODE- 250/637: Performed by: NURSE ANESTHETIST, CERTIFIED REGISTERED

## 2019-01-15 PROCEDURE — 77030003029 HC SUT VCRL J&J -B: Performed by: ORTHOPAEDIC SURGERY

## 2019-01-15 PROCEDURE — 77030008467 HC STPLR SKN COVD -B: Performed by: ORTHOPAEDIC SURGERY

## 2019-01-15 DEVICE — HEAD FEM DELT V40 -5MM NK 36MM -- V40 BIOLOX: Type: IMPLANTABLE DEVICE | Site: HIP | Status: FUNCTIONAL

## 2019-01-15 DEVICE — STEM FEM SZ 4 127D 35X105MM -- ACCOLADE II V40: Type: IMPLANTABLE DEVICE | Site: HIP | Status: FUNCTIONAL

## 2019-01-15 DEVICE — SHELL ACET CLUS H 54E TRTANIUM -- TRIDENT II: Type: IMPLANTABLE DEVICE | Site: HIP | Status: FUNCTIONAL

## 2019-01-15 DEVICE — INSERT ACET 0DEG 36MM E X3 -- TRIDENT: Type: IMPLANTABLE DEVICE | Site: HIP | Status: FUNCTIONAL

## 2019-01-15 DEVICE — COMPONENT HIP PRSS FT MTL ON CERM POLYETH X3: Type: IMPLANTABLE DEVICE | Site: HIP | Status: FUNCTIONAL

## 2019-01-15 DEVICE — PLUG BNE BK TI HA HMSPHR SLD FOR TRIDENT ACET SHELL DOME H: Type: IMPLANTABLE DEVICE | Site: HIP | Status: FUNCTIONAL

## 2019-01-15 RX ORDER — BUDESONIDE AND FORMOTEROL FUMARATE DIHYDRATE 80; 4.5 UG/1; UG/1
2 AEROSOL RESPIRATORY (INHALATION)
Status: DISCONTINUED | OUTPATIENT
Start: 2019-01-15 | End: 2019-01-16 | Stop reason: HOSPADM

## 2019-01-15 RX ORDER — CELECOXIB 100 MG/1
200 CAPSULE ORAL EVERY 12 HOURS
Status: DISCONTINUED | OUTPATIENT
Start: 2019-01-15 | End: 2019-01-16 | Stop reason: HOSPADM

## 2019-01-15 RX ORDER — LIDOCAINE HYDROCHLORIDE 10 MG/ML
0.1 INJECTION, SOLUTION EPIDURAL; INFILTRATION; INTRACAUDAL; PERINEURAL AS NEEDED
Status: DISCONTINUED | OUTPATIENT
Start: 2019-01-15 | End: 2019-01-15 | Stop reason: HOSPADM

## 2019-01-15 RX ORDER — EPINEPHRINE 1 MG/ML
INJECTION, SOLUTION, CONCENTRATE INTRAVENOUS AS NEEDED
Status: DISCONTINUED | OUTPATIENT
Start: 2019-01-15 | End: 2019-01-15 | Stop reason: HOSPADM

## 2019-01-15 RX ORDER — SUCCINYLCHOLINE CHLORIDE 20 MG/ML
INJECTION INTRAMUSCULAR; INTRAVENOUS AS NEEDED
Status: DISCONTINUED | OUTPATIENT
Start: 2019-01-15 | End: 2019-01-15 | Stop reason: HOSPADM

## 2019-01-15 RX ORDER — ACETAMINOPHEN 500 MG
1000 TABLET ORAL EVERY 6 HOURS
Status: DISCONTINUED | OUTPATIENT
Start: 2019-01-15 | End: 2019-01-16 | Stop reason: HOSPADM

## 2019-01-15 RX ORDER — SODIUM CHLORIDE 0.9 % (FLUSH) 0.9 %
5-40 SYRINGE (ML) INJECTION EVERY 8 HOURS
Status: DISCONTINUED | OUTPATIENT
Start: 2019-01-15 | End: 2019-01-15 | Stop reason: HOSPADM

## 2019-01-15 RX ORDER — FLUMAZENIL 0.1 MG/ML
0.2 INJECTION INTRAVENOUS AS NEEDED
Status: DISCONTINUED | OUTPATIENT
Start: 2019-01-15 | End: 2019-01-16 | Stop reason: HOSPADM

## 2019-01-15 RX ORDER — WARFARIN 10 MG/1
10 TABLET ORAL ONCE
Status: COMPLETED | OUTPATIENT
Start: 2019-01-15 | End: 2019-01-15

## 2019-01-15 RX ORDER — SODIUM CHLORIDE 0.9 % (FLUSH) 0.9 %
5-40 SYRINGE (ML) INJECTION EVERY 8 HOURS
Status: DISCONTINUED | OUTPATIENT
Start: 2019-01-15 | End: 2019-01-16 | Stop reason: HOSPADM

## 2019-01-15 RX ORDER — FENTANYL CITRATE 50 UG/ML
INJECTION, SOLUTION INTRAMUSCULAR; INTRAVENOUS AS NEEDED
Status: DISCONTINUED | OUTPATIENT
Start: 2019-01-15 | End: 2019-01-15 | Stop reason: HOSPADM

## 2019-01-15 RX ORDER — SODIUM CHLORIDE 0.9 % (FLUSH) 0.9 %
5-40 SYRINGE (ML) INJECTION AS NEEDED
Status: DISCONTINUED | OUTPATIENT
Start: 2019-01-15 | End: 2019-01-15 | Stop reason: HOSPADM

## 2019-01-15 RX ORDER — POVIDONE-IODINE 10 %
SOLUTION, NON-ORAL TOPICAL AS NEEDED
Status: DISCONTINUED | OUTPATIENT
Start: 2019-01-15 | End: 2019-01-15 | Stop reason: HOSPADM

## 2019-01-15 RX ORDER — ZOLPIDEM TARTRATE 5 MG/1
5 TABLET ORAL
Status: DISCONTINUED | OUTPATIENT
Start: 2019-01-15 | End: 2019-01-16 | Stop reason: HOSPADM

## 2019-01-15 RX ORDER — PROPOFOL 10 MG/ML
INJECTION, EMULSION INTRAVENOUS AS NEEDED
Status: DISCONTINUED | OUTPATIENT
Start: 2019-01-15 | End: 2019-01-15 | Stop reason: HOSPADM

## 2019-01-15 RX ORDER — ACETAMINOPHEN 500 MG
1000 TABLET ORAL ONCE
Status: COMPLETED | OUTPATIENT
Start: 2019-01-15 | End: 2019-01-15

## 2019-01-15 RX ORDER — AMOXICILLIN 250 MG
1 CAPSULE ORAL 2 TIMES DAILY
Status: DISCONTINUED | OUTPATIENT
Start: 2019-01-15 | End: 2019-01-16 | Stop reason: HOSPADM

## 2019-01-15 RX ORDER — POLYETHYLENE GLYCOL 3350 17 G/17G
17 POWDER, FOR SOLUTION ORAL DAILY
Status: DISCONTINUED | OUTPATIENT
Start: 2019-01-15 | End: 2019-01-16 | Stop reason: HOSPADM

## 2019-01-15 RX ORDER — KETOROLAC TROMETHAMINE 30 MG/ML
INJECTION, SOLUTION INTRAMUSCULAR; INTRAVENOUS AS NEEDED
Status: DISCONTINUED | OUTPATIENT
Start: 2019-01-15 | End: 2019-01-15 | Stop reason: HOSPADM

## 2019-01-15 RX ORDER — ALBUTEROL SULFATE 0.83 MG/ML
2.5 SOLUTION RESPIRATORY (INHALATION)
Status: DISCONTINUED | OUTPATIENT
Start: 2019-01-15 | End: 2019-01-16 | Stop reason: HOSPADM

## 2019-01-15 RX ORDER — ROPIVACAINE HYDROCHLORIDE 2 MG/ML
30 INJECTION, SOLUTION EPIDURAL; INFILTRATION; PERINEURAL
Status: COMPLETED | OUTPATIENT
Start: 2019-01-15 | End: 2019-01-15

## 2019-01-15 RX ORDER — POLYMYXIN B 500000 [USP'U]/1
INJECTION, POWDER, LYOPHILIZED, FOR SOLUTION INTRAMUSCULAR; INTRATHECAL; INTRAVENOUS; OPHTHALMIC AS NEEDED
Status: DISCONTINUED | OUTPATIENT
Start: 2019-01-15 | End: 2019-01-15 | Stop reason: HOSPADM

## 2019-01-15 RX ORDER — SODIUM CHLORIDE, SODIUM LACTATE, POTASSIUM CHLORIDE, CALCIUM CHLORIDE 600; 310; 30; 20 MG/100ML; MG/100ML; MG/100ML; MG/100ML
75 INJECTION, SOLUTION INTRAVENOUS CONTINUOUS
Status: DISCONTINUED | OUTPATIENT
Start: 2019-01-15 | End: 2019-01-15 | Stop reason: HOSPADM

## 2019-01-15 RX ORDER — PREGABALIN 25 MG/1
25 CAPSULE ORAL 2 TIMES DAILY
Status: DISCONTINUED | OUTPATIENT
Start: 2019-01-15 | End: 2019-01-16 | Stop reason: HOSPADM

## 2019-01-15 RX ORDER — ONDANSETRON 2 MG/ML
INJECTION INTRAMUSCULAR; INTRAVENOUS AS NEEDED
Status: DISCONTINUED | OUTPATIENT
Start: 2019-01-15 | End: 2019-01-15 | Stop reason: HOSPADM

## 2019-01-15 RX ORDER — ATORVASTATIN CALCIUM 20 MG/1
20 TABLET, FILM COATED ORAL
Status: DISCONTINUED | OUTPATIENT
Start: 2019-01-15 | End: 2019-01-16 | Stop reason: HOSPADM

## 2019-01-15 RX ORDER — OXYCODONE HYDROCHLORIDE 10 MG/1
10-15 TABLET ORAL
Status: DISCONTINUED | OUTPATIENT
Start: 2019-01-15 | End: 2019-01-16 | Stop reason: HOSPADM

## 2019-01-15 RX ORDER — HYDROMORPHONE HYDROCHLORIDE 2 MG/ML
0.5 INJECTION, SOLUTION INTRAMUSCULAR; INTRAVENOUS; SUBCUTANEOUS
Status: DISCONTINUED | OUTPATIENT
Start: 2019-01-15 | End: 2019-01-15 | Stop reason: HOSPADM

## 2019-01-15 RX ORDER — MIDAZOLAM HYDROCHLORIDE 1 MG/ML
2 INJECTION, SOLUTION INTRAMUSCULAR; INTRAVENOUS ONCE
Status: COMPLETED | OUTPATIENT
Start: 2019-01-15 | End: 2019-01-15

## 2019-01-15 RX ORDER — VECURONIUM BROMIDE FOR INJECTION 1 MG/ML
INJECTION, POWDER, LYOPHILIZED, FOR SOLUTION INTRAVENOUS AS NEEDED
Status: DISCONTINUED | OUTPATIENT
Start: 2019-01-15 | End: 2019-01-15 | Stop reason: HOSPADM

## 2019-01-15 RX ORDER — NITROFURANTOIN 25; 75 MG/1; MG/1
100 CAPSULE ORAL 2 TIMES DAILY
Status: DISCONTINUED | OUTPATIENT
Start: 2019-01-15 | End: 2019-01-16 | Stop reason: HOSPADM

## 2019-01-15 RX ORDER — CALCIUM CARBONATE/VITAMIN D3 250-3.125
1 TABLET ORAL 2 TIMES DAILY
Status: DISCONTINUED | OUTPATIENT
Start: 2019-01-15 | End: 2019-01-16 | Stop reason: HOSPADM

## 2019-01-15 RX ORDER — PANTOPRAZOLE SODIUM 40 MG/1
40 TABLET, DELAYED RELEASE ORAL DAILY
Status: DISCONTINUED | OUTPATIENT
Start: 2019-01-15 | End: 2019-01-16 | Stop reason: HOSPADM

## 2019-01-15 RX ORDER — VANCOMYCIN HYDROCHLORIDE 1 G/20ML
INJECTION, POWDER, LYOPHILIZED, FOR SOLUTION INTRAVENOUS AS NEEDED
Status: DISCONTINUED | OUTPATIENT
Start: 2019-01-15 | End: 2019-01-15 | Stop reason: HOSPADM

## 2019-01-15 RX ORDER — PHENYLEPHRINE HCL IN 0.9% NACL 1 MG/10 ML
SYRINGE (ML) INTRAVENOUS AS NEEDED
Status: DISCONTINUED | OUTPATIENT
Start: 2019-01-15 | End: 2019-01-15 | Stop reason: HOSPADM

## 2019-01-15 RX ORDER — NEOSTIGMINE METHYLSULFATE 1 MG/ML
INJECTION INTRAVENOUS AS NEEDED
Status: DISCONTINUED | OUTPATIENT
Start: 2019-01-15 | End: 2019-01-15 | Stop reason: HOSPADM

## 2019-01-15 RX ORDER — FAMOTIDINE 20 MG/1
20 TABLET, FILM COATED ORAL ONCE
Status: COMPLETED | OUTPATIENT
Start: 2019-01-15 | End: 2019-01-15

## 2019-01-15 RX ORDER — ONDANSETRON 2 MG/ML
4 INJECTION INTRAMUSCULAR; INTRAVENOUS
Status: DISCONTINUED | OUTPATIENT
Start: 2019-01-15 | End: 2019-01-16 | Stop reason: HOSPADM

## 2019-01-15 RX ORDER — BUPIVACAINE HYDROCHLORIDE 5 MG/ML
INJECTION, SOLUTION EPIDURAL; INTRACAUDAL AS NEEDED
Status: DISCONTINUED | OUTPATIENT
Start: 2019-01-15 | End: 2019-01-15 | Stop reason: HOSPADM

## 2019-01-15 RX ORDER — CYCLOBENZAPRINE HCL 5 MG
5 TABLET ORAL
Status: DISCONTINUED | OUTPATIENT
Start: 2019-01-15 | End: 2019-01-16 | Stop reason: HOSPADM

## 2019-01-15 RX ORDER — NALOXONE HYDROCHLORIDE 0.4 MG/ML
0.4 INJECTION, SOLUTION INTRAMUSCULAR; INTRAVENOUS; SUBCUTANEOUS AS NEEDED
Status: DISCONTINUED | OUTPATIENT
Start: 2019-01-15 | End: 2019-01-16 | Stop reason: HOSPADM

## 2019-01-15 RX ORDER — FENTANYL CITRATE 50 UG/ML
100 INJECTION, SOLUTION INTRAMUSCULAR; INTRAVENOUS ONCE
Status: COMPLETED | OUTPATIENT
Start: 2019-01-15 | End: 2019-01-15

## 2019-01-15 RX ORDER — ROPIVACAINE HYDROCHLORIDE 5 MG/ML
INJECTION, SOLUTION EPIDURAL; INFILTRATION; PERINEURAL
Status: COMPLETED | OUTPATIENT
Start: 2019-01-15 | End: 2019-01-15

## 2019-01-15 RX ORDER — LIDOCAINE HYDROCHLORIDE 20 MG/ML
INJECTION, SOLUTION EPIDURAL; INFILTRATION; INTRACAUDAL; PERINEURAL AS NEEDED
Status: DISCONTINUED | OUTPATIENT
Start: 2019-01-15 | End: 2019-01-15 | Stop reason: HOSPADM

## 2019-01-15 RX ORDER — SODIUM CHLORIDE 0.9 % (FLUSH) 0.9 %
5-40 SYRINGE (ML) INJECTION AS NEEDED
Status: DISCONTINUED | OUTPATIENT
Start: 2019-01-15 | End: 2019-01-16 | Stop reason: HOSPADM

## 2019-01-15 RX ORDER — LANOLIN ALCOHOL/MO/W.PET/CERES
1 CREAM (GRAM) TOPICAL 2 TIMES DAILY WITH MEALS
Status: DISCONTINUED | OUTPATIENT
Start: 2019-01-15 | End: 2019-01-16 | Stop reason: HOSPADM

## 2019-01-15 RX ORDER — CELECOXIB 400 MG/1
400 CAPSULE ORAL ONCE
Status: COMPLETED | OUTPATIENT
Start: 2019-01-15 | End: 2019-01-15

## 2019-01-15 RX ORDER — MORPHINE SULFATE 4 MG/ML
INJECTION, SOLUTION INTRAMUSCULAR; INTRAVENOUS AS NEEDED
Status: DISCONTINUED | OUTPATIENT
Start: 2019-01-15 | End: 2019-01-15 | Stop reason: HOSPADM

## 2019-01-15 RX ORDER — DEXTROSE MONOHYDRATE AND SODIUM CHLORIDE 5; .45 G/100ML; G/100ML
100 INJECTION, SOLUTION INTRAVENOUS CONTINUOUS
Status: DISPENSED | OUTPATIENT
Start: 2019-01-15 | End: 2019-01-16

## 2019-01-15 RX ORDER — PREGABALIN 75 MG/1
75 CAPSULE ORAL ONCE
Status: COMPLETED | OUTPATIENT
Start: 2019-01-15 | End: 2019-01-15

## 2019-01-15 RX ORDER — GLYCOPYRROLATE 0.2 MG/ML
INJECTION INTRAMUSCULAR; INTRAVENOUS AS NEEDED
Status: DISCONTINUED | OUTPATIENT
Start: 2019-01-15 | End: 2019-01-15 | Stop reason: HOSPADM

## 2019-01-15 RX ORDER — CEFAZOLIN SODIUM 2 G/50ML
2 SOLUTION INTRAVENOUS EVERY 8 HOURS
Status: COMPLETED | OUTPATIENT
Start: 2019-01-15 | End: 2019-01-16

## 2019-01-15 RX ORDER — INSULIN LISPRO 100 [IU]/ML
INJECTION, SOLUTION INTRAVENOUS; SUBCUTANEOUS ONCE
Status: DISCONTINUED | OUTPATIENT
Start: 2019-01-15 | End: 2019-01-15 | Stop reason: HOSPADM

## 2019-01-15 RX ORDER — CEFAZOLIN SODIUM 2 G/50ML
2 SOLUTION INTRAVENOUS
Status: COMPLETED | OUTPATIENT
Start: 2019-01-15 | End: 2019-01-15

## 2019-01-15 RX ADMIN — BUDESONIDE AND FORMOTEROL FUMARATE DIHYDRATE 2 PUFF: 80; 4.5 AEROSOL RESPIRATORY (INHALATION) at 14:11

## 2019-01-15 RX ADMIN — NEOSTIGMINE METHYLSULFATE 3 MG: 1 INJECTION INTRAVENOUS at 08:51

## 2019-01-15 RX ADMIN — FENTANYL CITRATE 50 MCG: 50 INJECTION, SOLUTION INTRAMUSCULAR; INTRAVENOUS at 07:31

## 2019-01-15 RX ADMIN — DEXTROSE MONOHYDRATE AND SODIUM CHLORIDE 100 ML/HR: 5; .45 INJECTION, SOLUTION INTRAVENOUS at 22:59

## 2019-01-15 RX ADMIN — Medication 50 MCG: at 07:47

## 2019-01-15 RX ADMIN — PANTOPRAZOLE SODIUM 40 MG: 40 TABLET, DELAYED RELEASE ORAL at 12:37

## 2019-01-15 RX ADMIN — ROPIVACAINE HYDROCHLORIDE 60 MG: 2 INJECTION, SOLUTION EPIDURAL; INFILTRATION; PERINEURAL at 07:13

## 2019-01-15 RX ADMIN — CELECOXIB 400 MG: 400 CAPSULE ORAL at 06:29

## 2019-01-15 RX ADMIN — CEFAZOLIN SODIUM 3 G: 2 SOLUTION INTRAVENOUS at 07:38

## 2019-01-15 RX ADMIN — Medication 10 ML: at 23:02

## 2019-01-15 RX ADMIN — WARFARIN SODIUM 10 MG: 10 TABLET ORAL at 06:29

## 2019-01-15 RX ADMIN — ONDANSETRON 4 MG: 2 INJECTION INTRAMUSCULAR; INTRAVENOUS at 08:52

## 2019-01-15 RX ADMIN — TRANEXAMIC ACID 1 G: 100 INJECTION, SOLUTION INTRAVENOUS at 07:41

## 2019-01-15 RX ADMIN — LIDOCAINE HYDROCHLORIDE 60 MG: 20 INJECTION, SOLUTION EPIDURAL; INFILTRATION; INTRACAUDAL; PERINEURAL at 07:35

## 2019-01-15 RX ADMIN — CEFAZOLIN SODIUM 2 G: 2 SOLUTION INTRAVENOUS at 15:50

## 2019-01-15 RX ADMIN — TRANEXAMIC ACID 1 G: 100 INJECTION, SOLUTION INTRAVENOUS at 08:55

## 2019-01-15 RX ADMIN — ACETAMINOPHEN 1000 MG: 500 TABLET ORAL at 06:29

## 2019-01-15 RX ADMIN — CALCIUM CARBONATE-CHOLECALCIFEROL TAB 250 MG-125 UNIT 1 TABLET: 250-125 TAB at 17:58

## 2019-01-15 RX ADMIN — PREGABALIN 25 MG: 25 CAPSULE ORAL at 17:58

## 2019-01-15 RX ADMIN — MORPHINE SULFATE 4 MG: 4 INJECTION, SOLUTION INTRAMUSCULAR; INTRAVENOUS at 09:11

## 2019-01-15 RX ADMIN — PROPOFOL 120 MG: 10 INJECTION, EMULSION INTRAVENOUS at 07:35

## 2019-01-15 RX ADMIN — ACETAMINOPHEN 1000 MG: 500 TABLET ORAL at 12:37

## 2019-01-15 RX ADMIN — OXYCODONE HYDROCHLORIDE 10 MG: 10 TABLET ORAL at 20:58

## 2019-01-15 RX ADMIN — PREGABALIN 75 MG: 75 CAPSULE ORAL at 06:29

## 2019-01-15 RX ADMIN — FENTANYL CITRATE 100 MCG: 50 INJECTION INTRAMUSCULAR; INTRAVENOUS at 07:06

## 2019-01-15 RX ADMIN — SODIUM CHLORIDE, SODIUM LACTATE, POTASSIUM CHLORIDE, AND CALCIUM CHLORIDE 75 ML/HR: 600; 310; 30; 20 INJECTION, SOLUTION INTRAVENOUS at 06:29

## 2019-01-15 RX ADMIN — ATORVASTATIN CALCIUM 20 MG: 20 TABLET, FILM COATED ORAL at 21:00

## 2019-01-15 RX ADMIN — FERROUS SULFATE TAB 325 MG (65 MG ELEMENTAL FE) 325 MG: 325 (65 FE) TAB at 17:58

## 2019-01-15 RX ADMIN — CEFAZOLIN SODIUM 2 G: 2 SOLUTION INTRAVENOUS at 22:56

## 2019-01-15 RX ADMIN — FENTANYL CITRATE 50 MCG: 50 INJECTION, SOLUTION INTRAMUSCULAR; INTRAVENOUS at 07:57

## 2019-01-15 RX ADMIN — STANDARDIZED SENNA CONCENTRATE AND DOCUSATE SODIUM 1 TABLET: 8.6; 5 TABLET, FILM COATED ORAL at 17:59

## 2019-01-15 RX ADMIN — ACETAMINOPHEN 1000 MG: 500 TABLET ORAL at 23:00

## 2019-01-15 RX ADMIN — SODIUM CHLORIDE, SODIUM LACTATE, POTASSIUM CHLORIDE, AND CALCIUM CHLORIDE: 600; 310; 30; 20 INJECTION, SOLUTION INTRAVENOUS at 08:22

## 2019-01-15 RX ADMIN — CELECOXIB 200 MG: 100 CAPSULE ORAL at 17:58

## 2019-01-15 RX ADMIN — VECURONIUM BROMIDE FOR INJECTION 6 MG: 1 INJECTION, POWDER, LYOPHILIZED, FOR SOLUTION INTRAVENOUS at 07:45

## 2019-01-15 RX ADMIN — DEXTROSE MONOHYDRATE AND SODIUM CHLORIDE 100 ML/HR: 5; .45 INJECTION, SOLUTION INTRAVENOUS at 13:11

## 2019-01-15 RX ADMIN — ACETAMINOPHEN 1000 MG: 500 TABLET ORAL at 17:59

## 2019-01-15 RX ADMIN — NITROFURANTOIN MONOHYDRATE AND NITROFURANTOIN MACROCRYSTALLINE 100 MG: 75; 25 CAPSULE ORAL at 17:58

## 2019-01-15 RX ADMIN — FAMOTIDINE 20 MG: 20 TABLET, FILM COATED ORAL at 06:29

## 2019-01-15 RX ADMIN — MIDAZOLAM HYDROCHLORIDE 2 MG: 2 INJECTION, SOLUTION INTRAMUSCULAR; INTRAVENOUS at 07:06

## 2019-01-15 RX ADMIN — LIDOCAINE HYDROCHLORIDE 0.1 ML: 10 INJECTION, SOLUTION EPIDURAL; INFILTRATION; INTRACAUDAL; PERINEURAL at 07:14

## 2019-01-15 RX ADMIN — SUCCINYLCHOLINE CHLORIDE 120 MG: 20 INJECTION INTRAMUSCULAR; INTRAVENOUS at 07:35

## 2019-01-15 RX ADMIN — GLYCOPYRROLATE 0.4 MG: 0.2 INJECTION INTRAMUSCULAR; INTRAVENOUS at 08:51

## 2019-01-15 RX ADMIN — Medication 50 MCG: at 08:05

## 2019-01-15 RX ADMIN — Medication 10 ML: at 13:14

## 2019-01-15 RX ADMIN — NITROFURANTOIN MONOHYDRATE AND NITROFURANTOIN MACROCRYSTALLINE 100 MG: 75; 25 CAPSULE ORAL at 12:37

## 2019-01-15 RX ADMIN — STANDARDIZED SENNA CONCENTRATE AND DOCUSATE SODIUM 1 TABLET: 8.6; 5 TABLET, FILM COATED ORAL at 12:37

## 2019-01-15 RX ADMIN — ONDANSETRON 4 MG: 2 INJECTION INTRAMUSCULAR; INTRAVENOUS at 07:30

## 2019-01-15 RX ADMIN — ROPIVACAINE HYDROCHLORIDE 10 ML: 5 INJECTION, SOLUTION EPIDURAL; INFILTRATION; PERINEURAL at 07:11

## 2019-01-15 NOTE — BRIEF OP NOTE
BRIEF OPERATIVE NOTE Date of Procedure: 1/15/2019 Preoperative Diagnosis: Osteoarthritis of left hip, unspecified osteoarthritis type [M16.12] Postoperative Diagnosis: Osteoarthritis of left hip, unspecified osteoarthritis type with osteomalacia and abductor fatty infiltrate/atrophy [M16.12] Procedure(s): LEFT TOTAL HIP ARTHROPLASTY LATERAL APPROACH/LD/NEVAEH TO ASSIST/NERVE BLOCK Surgeon(s) and Role: Fatoumata Hoskins MD - Primary Surgical Assistant: Isra Martinez Surgical Staff: 
Circ-1: Michela Pringle RN 
Circ-2: Chelsie Freeman Physician Assistant: Celeste Kelley PA-C Scrub Tech-1: Al Davis Surg Asst-1: Rigoberto Amor Event Time In Time Out Incision Start 2328 Incision Close Anesthesia: General  
Estimated Blood Loss: 300ml Specimens:  
ID Type Source Tests Collected by Time Destination 1 : left femoral head Preservative Hip, left  Aga Lopez MD 1/15/2019 1302 Pathology Findings: same Complications: none Implants:  
Implant Name Type Inv. Item Serial No.  Lot No. LRB No. Used Action PLUG APCL H ACET SHLL --  - [id number]  PLUG APCL H ACET SHLL --   [employer] HOW [id number] Left 1 Implanted 6.5mm Low Profile Hex Screw    [employer] 6X6D Left 1 Implanted 6.5mm Low Profile Hex Screw    [employer] 6X7A Left 1 Implanted 6.5mm Low Profile Hex Screw    [employer] 6X2A Left 1 Implanted SHELL ACET CLUS H 54E Shahid Messenger II - [id number]  SHELL ACET CLUS H 54E TRTANIUM -- Elicia Lanes [employer] HOW [id number] Left 1 Implanted INSERT ACET 0DEG 36MM E X3 -- TRIDENT - [id number]  INSERT ACET 0DEG 36MM E X3 -- TRIDENT  [employer] HOW [id number] Left 1 Implanted STEM FEM SZ 4 [id number] -- Jamia Mahajan - [id number]  STEM FEM SZ 4 [id number] -- ACCOLADE II V40  [employer] HOW [id number] Left 1 Implanted HEAD FEM DELT V40 -5MM NK 36MM -- V40 BIOLOX - [id number]  HEAD FEM DELT V40 -5MM NK 36MM -- V40 BIOLOX  LD [employer] [facility] [id number] Left 1 Implanted

## 2019-01-15 NOTE — PERIOP NOTES
TRANSFER - OUT REPORT: 
 
Verbal report given to 19 Vivien Martin RN on Sally Lemus  being transferred to  for routine post - op Report consisted of patients Situation, Background, Assessment and  
Recommendations(SBAR). Information from the following report(s) SBAR and MAR was reviewed with the receiving nurse. Lines:  
Peripheral IV 01/15/19 Posterior;Right Hand (Active) Site Assessment Clean, dry, & intact 1/15/2019  9:23 AM  
Phlebitis Assessment 0 1/15/2019  9:23 AM  
Infiltration Assessment 0 1/15/2019  9:23 AM  
Dressing Status Clean, dry, & intact 1/15/2019  9:23 AM  
Dressing Type Tape;Transparent 1/15/2019  9:23 AM  
Hub Color/Line Status Pink; Infusing 1/15/2019  9:23 AM  
  
 
Opportunity for questions and clarification was provided. Patient transported with: 
 [employer] C[employer]

## 2019-01-15 NOTE — PROGRESS NOTES
Problem: Mobility Impaired (Adult and Pediatric) Goal: *Acute Goals and Plan of Care (Insert Text) Physical Therapy Goals Initiated 1/15/2019 and to be accomplished within 7 day(s) 1. Patient will move from supine to sit and sit to supine  in bed with supervision/set-up. 2.  Patient will transfer from bed to chair and chair to bed with supervision/set-up using the least restrictive device. 3.  Patient will perform sit to stand with supervision/set-up. 4.  Patient will ambulate with supervision/set-up for 50 feet with the least restrictive device. 5.  Patient will ascend/descend 3 stairs with no handrail(s) and appropriate AD with minimal assistance/contact guard assist. 
Outcome: Progressing Towards Goal 
physical Therapy EVALUATION Patient: Lucy Chauhan (89 y.o. female) Date: 1/15/2019 Primary Diagnosis: Osteoarthritis of left hip, unspecified osteoarthritis type [M16.12] Procedure(s) (LRB): LEFT TOTAL HIP ARTHROPLASTY LATERAL APPROACH (Left) Day of Surgery Precautions:   Fall, WBAT, Total hip OBJECTIVE/ASSESSMENT : 
Based on the objective data described below, the patient presents with impaired functional mobility including bed mobility, transfers, ambulation, and general activity tolerance following admission for L THR. Nursing cleared pt to participate but stated L LE was essentially non-functional. Patient presented today sitting up in recliner, agreeable to physical therapy evaluation, family at bedside. Patient transferred sit-stand with moderate assistance and pt attempted to take several short steps; however L LE did buckle (pt recovered) and pt returned to sitting in recliner. At conclusion of session, patient left sitting up in recliner, hip abduction pillow in place with call bell in reach, needs met, and nurse notified. Education:  
[x]         Bed mobility [x]         Transfers 
[x]         Ambulation 
[x]         Assistive device management 
[]         Stairs [x]         Body mechanics [x]         Position change 
[x]         Activity pacing/energy conservation 
[x]         Other: Total hip precautions Patient will benefit from skilled intervention to address the above impairments. Patients rehabilitation potential is considered to be Good Factors which may influence rehabilitation potential include:  
[]         None noted 
[]         Mental ability/status []         Medical condition 
[]         Home/family situation and support systems 
[]         Safety awareness 
[]         Pain tolerance/management 
[]         Other: PLAN : 
Recommendations and Planned Interventions: 
[x]           Bed Mobility Training             [x]    Neuromuscular Re-Education 
[x]           Transfer Training                   []    Orthotic/Prosthetic Training 
[x]           Gait Training                          []    Modalities [x]           Therapeutic Exercises          []    Edema Management/Control 
[x]           Therapeutic Activities            [x]    Patient and Family Training/Education 
[]           Other (comment): Frequency/Duration: Patient will be followed by physical therapy 1-2 times per day, 4-7 days per week to address goals. Discharge Recommendations: Home Health Further Equipment Recommendations for Discharge: N/A  
 
SUBJECTIVE:  
Patient stated I can't feel my left leg, it's numb.  OBJECTIVE DATA SUMMARY:  
 
Past Medical History:  
Diagnosis Date  Arthritis  [facility] Left  Cancer ([facility] 2001 Lobular situ-Left breast  
 Chronic pain  DVT (deep venous thrombosis) ([facility]  GERD (gastroesophageal reflux disease)  Hx of radiation therapy  Hyperlipidemia  Hypertension   
 no meds  Low back pain potentially associated with radiculopathy  Menopause  Osteopenia   
 mild  Pulmonary embolism ([facility]  Radiation therapy complication Left breast  
 S/P hip replacement Right hip  Trochanteric bursitis of right hip  Wears glasses Past Surgical History:  
Procedure Laterality Date  HX HIP REPLACEMENT  2/12/2003 Right hip  HX MASTECTOMY  2001 Left partial  
 HX OTHER SURGICAL Bilateral 2014  
 bilat styes on eyes [address] Barriers to Learning/Limitations: None Compensate with: N/A Prior Level of Function/Home Situation: Pt lives with  in a 1 story home with 3 steps to enter, no railings. Prior to this procedure pt was ambulating independently, alternating between cane and RW. Home Situation Home Environment: Private residence # Steps to Enter: 3 Rails to Enter: No 
One/Two Story Residence: One story Living Alone: No 
Support Systems: Spouse/Significant Other/Partner Patient Expects to be Discharged to[de-identified] Private residence Current DME Used/Available at Home: Cane, straight, Commode, bedside, Walker, rollingCritical Behavior: 
Neurologic State: Drowsy Strength:   
Strength: (L LE grossly 2/5) Tone & Sensation:  
Tone: Normal 
Sensation: Intact except L LE in which pt reports numbness Range Of Motion: 
AROM: Generally decreased, functional 
Functional Mobility: 
Bed Mobility: 
Transfers: 
Sit to Stand: Moderate assistance Stand to Sit: Moderate assistance Balance:  
Sitting: Intact Standing: Impaired; With support Standing - Static: Fair Standing - Dynamic : PoorAmbulation/Gait Training: 
Distance (ft): 4 Feet (ft) Assistive Device: Walker, rolling Ambulation - Level of Assistance: Minimal assistance Pain: 
Pre session: 0 Post session: 5 ( L hip during transfers) Activity Tolerance:  
Fair Please refer to the flowsheet for vital signs taken during this treatment. After treatment:  
[x] Patient left in no apparent distress sitting up in chair 
[] Patient left sitting on EOB [] Patient left in no apparent distress in bed 
[] Patient declined to be OOB at this time due to  
[x] Call bell left within reach [x] Nursing notified(Catherine) [x] Caregiver present 
[] Bed alarm activated 
[x] SCDs in place COMMUNICATION/EDUCATION:  
[x]         Fall prevention education was provided and the patient/caregiver indicated understanding. [x]         Patient/family have participated as able in goal setting and plan of care. [x]         Patient/family agree to work toward stated goals and plan of care. []         Patient understands intent and goals of therapy, but is neutral about his/her participation. []         Patient is unable to participate in goal setting and plan of care. Thank you for this referral. 
Ana Lilia Chacon, PT Time Calculation: 15 mins Eval Complexity: History: MEDIUM  Complexity : 1-2 comorbidities / personal factors will impact the outcome/ POC Exam:MEDIUM Complexity : 3 Standardized tests and measures addressing body structure, function, activity limitation and / or participation in recreation  Presentation: MEDIUM Complexity : Evolving with changing characteristics  Clinical Decision Making:Medium Complexity   Overall Complexity:MEDIUM

## 2019-01-15 NOTE — ANESTHESIA POSTPROCEDURE EVALUATION
Procedure(s): LEFT TOTAL HIP ARTHROPLASTY LATERAL APPROACH. Anesthesia Post Evaluation Multimodal analgesia: multimodal analgesia used between 6 hours prior to anesthesia start to PACU discharge Patient location during evaluation: bedside Patient participation: complete - patient participated Level of consciousness: awake Pain management: adequate Airway patency: patent Anesthetic complications: no 
Cardiovascular status: stable Respiratory status: acceptable Hydration status: acceptable Post anesthesia nausea and vomiting:  controlled Visit Vitals /71 Pulse (!) 53 Temp 36.1 °C (96.9 °F) Resp 12 Ht 5' 7\" (1.702 m) Wt 99.6 kg (219 lb 8 oz) SpO2 100% BMI 33.37 kg/m²

## 2019-01-15 NOTE — ANESTHESIA PREPROCEDURE EVALUATION
Anesthetic History No history of anesthetic complications Review of Systems / Medical History Patient summary reviewed and pertinent labs reviewed Pulmonary Within defined limits Neuro/Psych Within defined limits Cardiovascular Hypertension Comments: H/o PE  
GI/Hepatic/Renal 
  
GERD Endo/Other Morbid obesity and arthritis Other Findings Physical Exam 
 
Airway Mallampati: II 
TM Distance: 4 - 6 cm Neck ROM: normal range of motion Mouth opening: Normal 
 
 Cardiovascular Rhythm: regular Rate: normal 
 
 
 
 Dental 
 
Dentition: Poor dentition, Full upper dentures and Full lower dentures Pulmonary Breath sounds clear to auscultation Abdominal 
GI exam deferred Other Findings Anesthetic Plan ASA: 3 Anesthesia type: general 
 
 
Post-op pain plan if not by surgeon: peripheral nerve block single Induction: Intravenous Anesthetic plan and risks discussed with: Patient

## 2019-01-15 NOTE — PROGRESS NOTES
Problem: Mobility Impaired (Adult and Pediatric) Goal: *Acute Goals and Plan of Care (Insert Text) Physical Therapy Goals Initiated 1/15/2019 and to be accomplished within 7 day(s) 1. Patient will move from supine to sit and sit to supine  in bed with supervision/set-up. 2.  Patient will transfer from bed to chair and chair to bed with supervision/set-up using the least restrictive device. 3.  Patient will perform sit to stand with supervision/set-up. 4.  Patient will ambulate with supervision/set-up for 50 feet with the least restrictive device. 5.  Patient will ascend/descend 3 stairs with no handrail(s) and appropriate AD with minimal assistance/contact guard assist.  
Outcome: Progressing Towards Goal 
physical Therapy TREATMENT Patient: Trent Mcgee (30 y.o. female) Date: 1/15/2019 Diagnosis: Osteoarthritis of left hip, unspecified osteoarthritis type [M16.12] <principal problem not specified> Procedure(s) (LRB): LEFT TOTAL HIP ARTHROPLASTY LATERAL APPROACH (Left) Day of Surgery Precautions: Fall, WBAT, Total hip Chart, physical therapy assessment, plan of care and goals were reviewed. OBJECTIVE/ASSESSMENT: 
Pt received sitting up in recliner, agreeable to physical therapy treatment. Pt still c/o L LE numbness; noted to have increased quad contraction. Pt transferred sit-stand with moderate assistance again. Stood supported with fair balance. Pt then attempted to take a step with L LE and was unable to ambulate without buckling. Pt returned to recliner and SCDs were placed back on LEs. Abductor pillow placed and THR precautions reviewed. Education:  
[x]         Bed mobility []         Transfers 
[x]         Ambulation 
[]         Assistive device management 
[]         Stairs 
[]         Body mechanics [x]         Position change 
[x]         Activity pacing/energy conservation 
[x]         Other: THR precautions Progression toward goals: []      Improving appropriately and progressing toward goals [x]      Improving slowly and progressing toward goals 
[]      Not making progress toward goals and plan of care will be adjusted PLAN: 
Patient continues to benefit from skilled intervention to address the above impairments. Continue treatment per established plan of care. Discharge Recommendations:  Home Health Further Equipment Recommendations for Discharge:  N/A  
 
SUBJECTIVE:  
Patient stated I've been sleeping all afternoon.  OBJECTIVE DATA SUMMARY:  
Critical Behavior: 
Neurologic State: Alert Orientation Level: Oriented X4 Cognition: Appropriate decision making, Appropriate for age attention/concentration, Appropriate safety awareness Functional Mobility Training: 
 
Transfers: 
Sit to Stand: Moderate assistance Stand to Sit: Moderate assistance Balance: 
Sitting: Intact Standing: Impaired; With support Standing - Static: Fair Standing - Dynamic : PoorAmbulation/Gait Training: 
Distance (ft): 4 Feet (ft) Assistive Device: Walker, rolling Ambulation - Level of Assistance: Minimal assistance Gait Description (WDL): Exceptions to [facility] Gait Abnormalities: Antalgic;Decreased step clearance Stance: Left decreased Step Length: Left shortened;Right shortened Pain: 
Pre session: 0 Post session: 0 Activity Tolerance:  
Fair Please refer to the flowsheet for vital signs taken during this treatment. After treatment:  
[x] Patient left in no apparent distress sitting up in chair 
[] Patient left in no apparent distress in bed 
[x] Call bell left within reach [x] Nursing notified 
[x] Caregiver present 
[] Bed alarm activated Brady Grounds, PT Time Calculation: 24 mins

## 2019-01-15 NOTE — OP NOTES
[address] [place] [address]   OPERATIVE REPORT    Sukhjinder Gross  MR#: [id number]  : 1955  ACCOUNT #: [de-identified]   DATE OF SERVICE: 01/15/2019    PREOPERATIVE DIAGNOSIS:  End-stage arthritis of the left hip. POSTOPERATIVE DIAGNOSIS:  End-stage arthritis of the left hip with osteomalacia as well as a significant abductor atrophy, osteophytosis and also ankylosing capsulitis. PROCEDURE PERFORMED:  Left hip replacement using the [place] Accolade II system with a size 54 Trident  II titanium cup with screw augmentation, neutral 36 liner, a size 4, 127 high offset Accolade II femoral component and a 36 -5 ceramic femoral head. COMPLICATIONS:  No complications. IMPLANTS:  As above    SPECIMENS REMOVED:  Femoral head. ESTIMATED BLOOD LOSS:  300 mL. FIRST ASSISTANT:  Salina Gosselin     SECOND ASSISTANT:  Nirmala Garcia    ANESTHESIA:  Rosalia Romo MD.  Preoperative lumbar plexus block with light general.    Salina Gosselin was the first assistant and assisted with all aspects of the surgery, commencing with patient positioning, patient prep, patient drape, leg positioning during surgery, retracting assisting with the surgery itself closure, dressing placement and transfer. DESCRIPTION OF PROCEDURE:  After anesthetic was successfully induced,  it was confirmed the patient did receive antibiotics. Leg lengths determined, careful positioning taking great care to pad all sensitive areas and ensure the pelvis was square. A timeout was performed. A lateral approach to the hip performed. We needed Adson-Fawn at least 3-4 inches of adipose tissue prior to irrigating the fascia. IT band and linea incised sharply. Sciatic nerve identified, protected and avoided. A Charnley retractor introduced. Bursa released using capsular scissors. Gluteus medius identified and partially fatty infiltrated due to disuse,  very attenuated and thin as well.   Minimus and capsule divided directly over the femoral neck carrying back to the tip of the greater trochanter and trailing along down the vastus lateralis. The whole cuff of tissue was taken in one contiguous layer and the lesser trochanter identified. A pin placed in the superior iliac crest for offset leg length measurement. Bone quality was quite soft. We then dislocated the hip uneventfully using a small fingerbreadth above the lesser trochanter as per the preoperative templating,  femoral neck divided. Posterior capsule reflected with a Hook elevator. We then debrided the cup and identified the medial wall through the foveal notch. Excellent exposure. Medialized appropriately in appropriate inclination and anteversion, reamed up to accommodate the cup along the line reamed. I trialed this. I was very pleased with a 54 and implanted the definitive shell. It seated nicely augmented with a couple of screws and removed some posterior osteophyte. Initially with a trial liner, later with the definitive liner, made sure it was fully seated and checked it a [place], protected with a Ray-Zeb gauze later to be accounted for. Leg in a sterile leg bag lateralized with a [place] and box osteotome and broached our way up to accommodate a size 4, very snug with this and reduced the hip. We returned back to our offset and leg length guide and anatomic restoration of offset leg lengths. The hip was extremely stable and combined anteversion was perfect. No impingement. It should be noted we did remove some osteophytes posteriorly as well. We then liberally irrigated it and implanted definitive components. We trialed. I was happy with the -5 cleaned the trunnion, impacted on again reducing the hip and a dilute Betadine wash and a routine closure. At the end of the case the instrument, sponge and needle count was correct. No complications. The patient tolerated the procedure well and blood loss less than 300 excellent outcome.       Shun Hawk MD       AM / VN  D: 01/15/2019 09:07     T: 01/15/2019 10:44  JOB #: [id number]

## 2019-01-15 NOTE — ROUTINE PROCESS
Mobility Intervention:  
 
  [x] Pt dangled at edge of bed 
  [] Pt assisted OOB to bedside commode 
  [x] Pt assisted OOB to chair 
  [] Pt ambulated to bathroom 
  [] Patient was ambulated in room/hallway Assistive Device Utilized:  
  
 [] Rolling walker 
 [] Crutches 
 [] Straight Cane 
 [] Knee immobilizer [] IV pole After Rounding and Checking on Patient [x] Patient left in no apparent distress sitting up in chair 
[] Patient left in no apparent distress in bed 
[x] Call bell left within reach [x] SCDs on both legs & machine turned on 
[x] Ice applied 
[] RN notified 
[x] Caregiver present 
[] Bed alarm activated Reason patient not mobilized:  
 
 [] Patient refused 
 [] Nausea/vomiting 
 [] Low blood pressure 
 [] Drowsy/lethargic Pain Rating:  
 
 
Left patient with call light, cell phone and personal belongings in reach for safety.

## 2019-01-15 NOTE — ROUTINE PROCESS
TRANSFER - IN REPORT: 
 
Verbal report received from [place] on Cliffton Pavy  being received from PACU (unit) for routine post - op Report consisted of patients Situation, Background, Assessment and  
Recommendations(SBAR). Information from the following report(s) SBAR, OR Summary, Procedure Summary, Intake/Output and MAR was reviewed with the receiving nurse. Opportunity for questions and clarification was provided. Assessment completed upon patients arrival to unit and care assumed.

## 2019-01-15 NOTE — PROGRESS NOTES
Reason for Admission:  Osteoarthritis of left hip, unspecified osteoarthritis type [M16.12] RRAT Score:    5 Plan for utilizing home health: [facility] Likelihood of Readmission:   LOW Transition of Care Plan:         
 
 
Initial assessment completed with patient and spouse. Face sheet information confirmed:  Yes, except spouse new cell # is [phone]. The patient requests we communicate with patient and Tyler Bedolla, spouse in reference to medical care. This patient lives in a 1 story home with 3 steps to enter with spouse . Patient was able to navigate steps as needed. Prior to hospitalization, patient was considered to be independent : Yes . Cognitive status of patient: Alert and oriented to person, place, situation, and time. Patient has a current ACP document on file: No 
The patent's daugher will be available to transport patient home upon discharge. The patient already has bedside commode, cane, and rolling walker available in the home. Patient is not currently active with home health. Patient has not stayed in a skilled nursing facility or rehab. Previously used outpatient rehab 5 years ago. This patient is on dialysis : No 
 
 
List of available home health agencies was reviewed with the patient prior to discharge. Freedom of choice signed: Yes, for [address] and referral sent. Currently, the discharge plan is for home with home health. Care Management Interventions PCP Verified by CM: Yes Last Visit to PCP: 01/11/19 Mode of Transport at Discharge: Self Transition of Care Consult (CM Consult): Discharge Planning MyChart Signup: No 
Discharge Durable Medical Equipment: No 
Physical Therapy Consult: Yes Occupational Therapy Consult: Yes Speech Therapy Consult: No 
Current Support Network: Lives with Spouse Confirm Follow Up Transport: Family Plan discussed with Pt/Family/Caregiver: Yes Freedom of Choice Offered: Yes Discharge Location Discharge Placement: Home with home health SALLY Pelaez, RN Pager # [phone] Care Manager

## 2019-01-15 NOTE — PROGRESS NOTES
conducted a pre-surgery visit with Gabino Zamorano, who is a 58 y.o.,female. The  provided the following Interventions: 
Initiated a relationship of care and support. Offered prayer and assurance of continued prayers on patient's behalf. Plan: 
Chaplains will continue to follow and will provide pastoral care on an as needed/requested basis.  recommends bedside caregivers page  on duty if patient shows signs of acute spiritual or emotional distress. Fredi Ganser Board Certified [employer] [facility]  
[phone]

## 2019-01-15 NOTE — INTERVAL H&P NOTE
H&P Update: 
Bang Alaniz was seen and examined. History and physical has been reviewed. The patient has been examined.  There have been no significant clinical changes since the completion of the originally dated History and Physical. 
 
Signed By: Chan Valentine MD   
 January 15, 2019 7:01 AM

## 2019-01-15 NOTE — CONSULTS
Consult Note    Patient: Dai Aviles MRN: [id number]  CSN: [id number]    YOB: 1955  Age: 58 y.o. Sex: female    DOA: 1/15/2019 LOS:  LOS: 1 day        Requesting Physician: Meenakshi Acevedo MD    Reason for Consultation: HTN management                HPI:     Dai Aviles is a 58 y.o.  female who has been seen for HTN management. Pt feels ok, sitting in bed side commode. Tolerated surgery well. No intraoperative events. Due to Hx of DVT and PE in past, pt got IVC filter placed by [facility] on 1/14/19. Pt currently trying to urinate but not able too so far. Denies any suprapubic discomfort or pain. No Hx urinary retention. Denies any SOB/CP. Denies any palpitations or dizziness.      Past Medical History:   Diagnosis Date    Arthritis     Breast cancer ([address]     Left    Cancer ([facility] [address] 2001    Lobular situ-Left breast    Chronic pain     DVT (deep venous thrombosis) (HCC)     GERD (gastroesophageal reflux disease)     Hx of radiation therapy     Hyperlipidemia     Hypertension     no meds    Low back pain potentially associated with radiculopathy     Menopause     Osteopenia     mild    Pulmonary embolism ([address]     Radiation therapy complication     Left breast    S/P hip replacement     Right hip    Trochanteric bursitis of right hip     Wears glasses        Past Surgical History:   Procedure Laterality Date    HX HIP REPLACEMENT  2/12/2003    Right hip    HX MASTECTOMY  2001    Left partial    HX OTHER SURGICAL Bilateral 2014    bilat styes on eyes    HX TUBAL LIGATION  1983       Family History   Problem Relation Age of Onset    Asthma Other     Arthritis-osteo Other     Breast Cancer Maternal Aunt     Diabetes Mother     Heart Disease Mother     Alzheimer Mother     Diabetes Sister     Hypertension Sister     COPD Sister     Diabetes Brother     Hypertension Brother        Social History     Socioeconomic History    Marital status:  Spouse name: Not on file    Number of children: Not on file    Years of education: Not on file    Highest education level: Not on file   Tobacco Use    Smoking status: Former Smoker     Packs/day: 1.00     Years: 20.00     Pack years: 20.00     Types: Cigarettes    Smokeless tobacco: Never Used    Tobacco comment: QUIT APPROX 2000   Substance and Sexual Activity    Alcohol use: No     Frequency: Never     Comment: HOLIDAYS     Drug use: No       Prior to Admission medications    Medication Sig Start Date End Date Taking? Authorizing Provider   pantoprazole (PROTONIX) 40 mg tablet Take 40 mg by mouth daily. Yes Provider, Historical   cyclobenzaprine HCl (FLEXERIL PO) Take 5 mg by mouth as needed. Yes Provider, Historical   mometasone-formoterol (DULERA) 100-5 mcg/actuation HFA inhaler Take 2 Puffs by inhalation two (2) times daily as needed. Yes Provider, Historical   HYDROcodone-acetaminophen (NORCO) 7.5-325 mg per tablet Take 1 Tab by mouth every six (6) hours as needed for Pain. Max Daily Amount: 4 Tabs. 10/4/18  Yes Mercedes Mcgee PA-C   diclofenac (VOLTAREN) 1 % gel Apply 4 g to affected area four (4) times daily. Maximum 16 grams per joint per day. Dispense 5 100 gram tubes 4/30/18  Yes Juliane ORTEGA PA-C   naproxen sodium (ALEVE) 220 mg tablet Take 220 mg by mouth two (2) times daily (with meals). Yes Provider, Historical   cetirizine (ZYRTEC) 10 mg tablet Take  by mouth daily. Yes Provider, Historical   guaiFENesin (MUCINEX) 1,200 mg TM12 ER tablet Take 1,200 mg by mouth as needed. Yes Provider, Historical   polyethylene glycol (MIRALAX) 17 gram packet Take 17 g by mouth daily. Yes Provider, Historical   Inhalational Spacing Device (AEROCHAMBER) 1 Each by Does Not Apply route as needed. 3/29/13  Yes Kacie Contreras MD   CALCIUM CITRATE/VITAMIN D3 (CALCITRATE-VITAMIN D PO) Take  by mouth. Yes Provider, Historical   lubiPROStone (AMITIZA) 24 mcg capsule Take 24 mcg by mouth. Yes Provider, Historical   atorvastatin (LIPITOR) 20 mg tablet Take 20 mg by mouth nightly. Yes Provider, Historical   nitrofurantoin, macrocrystal-monohydrate, (MACROBID) 100 mg capsule Take 1 Cap by mouth two (2) times a day. 19   Pam Bhandari PA-C   levoFLOXacin (LEVAQUIN) 750 mg tablet Take 1 Tab by mouth daily. 1/3/19   Pam Bhandari PA-C   albuterol (PROVENTIL, VENTOLIN) 90 mcg/actuation inhaler Take 1-2 Puffs by inhalation every four (4) hours as needed for Wheezing. 3/29/13   Ashely Finley MD   acetaminophen (TYLENOL EXTRA STRENGTH) 500 mg tablet Take 500 mg by mouth every six (6) hours as needed. Provider, Historical       No Known Allergies    Review of Systems  GENERAL: Patient alert, awake and oriented times 3, able to communicate full sentences and not in distress. HEENT: No change in vision, no earache, tinnitus, sore throat or sinus congestion. NECK: No pain or stiffness. CARDIOVASCULAR: No chest pain or pressure. No palpitations. PULMONARY: No shortness of breath, cough or wheeze. GASTROINTESTINAL: No abdominal pain, nausea, vomiting or diarrhea, melena or bright red blood per rectum. GENITOURINARY: No urinary frequency, urgency, hesitancy or dysuria. ENDOCRINE: No polyuria, polydipsia, no heat or cold intolerance. No recent change in weight. HEMATOLOGICAL: No anemia or easy bruising or bleeding. NEUROLOGIC: No headache, seizures, numbness, tingling or weakness. Physical Exam:      Visit Vitals  /68 (BP 1 Location: Right arm, BP Patient Position: Sitting)   Pulse (!) 52   Temp 97.3 °F (36.3 °C)   Resp 16   Ht 5' 7\" (1.702 m)   Wt 99.6 kg (219 lb 8 oz)   SpO2 98%   Breastfeeding? No   BMI 33.37 kg/m²    O2 Flow Rate (L/min): 1 l/min O2 Device: Room air    Temp (24hrs), Av.5 °F (36.4 °C), Min:96.9 °F (36.1 °C), Max:98.2 °F (36.8 °C)    01/15 0701 - 01/15 1900  In: 8050 [I.V.:2350]  Out: -    No intake/output data recorded.      General: Alert, cooperative, no acute distress    HEENT: PERRLA, anicteric sclerae. Pulmonary:  CTA Bilaterally. No Wheezing/Rhonchi/Rales. Cardiovascular:  Regular rate and rhythm  GI:  Soft, Non distended, Non tender.  +Bowel sounds  Extremities: L hip dressing clean. No edema. No calf tenderness. Psych: Good insight. Not anxious or agitated. Neurologic:  Alert and oriented X 3. No acute neuro deficits. No suprapubic tenderness            Labs Reviewed    Procedures/imaging: see electronic medical records for all procedures/Xrays and details which were not copied into this note but were reviewed prior to creation of Plan    Assessment/Plan     1. Hip arthritis  S/p Hip arthroplasty today: plan per ortho team including pain management and DVT prophylaxis   2. History of pulmonary embolism and DVT in past: S/P IVC filter placement on 1/14/19  3. Dyslipidemia: cont statin    4. HTN: BP stable, cont cardiac diet   5. Asymptomatic bradycardia: will monitor in tele  6. Urinary retention: will get bladder scan and straight cath if needed. Pt will like to try to urinate.      7. DVT prophylaxis per ortho team   D/w pt in detail   D/w RN     Bryce Xiong MD  1/15/2019 4:24 PM

## 2019-01-16 ENCOUNTER — TELEPHONE (OUTPATIENT)
Dept: ORTHOPEDIC SURGERY | Age: 64
End: 2019-01-16

## 2019-01-16 ENCOUNTER — HOME HEALTH ADMISSION (OUTPATIENT)
Dept: HOME HEALTH SERVICES | Facility: HOME HEALTH | Age: 64
End: 2019-01-16
Payer: COMMERCIAL

## 2019-01-16 VITALS
HEIGHT: 67 IN | HEART RATE: 56 BPM | DIASTOLIC BLOOD PRESSURE: 70 MMHG | TEMPERATURE: 97.5 F | BODY MASS INDEX: 34.45 KG/M2 | RESPIRATION RATE: 18 BRPM | WEIGHT: 219.5 LBS | SYSTOLIC BLOOD PRESSURE: 105 MMHG | OXYGEN SATURATION: 98 %

## 2019-01-16 LAB
INR PPP: 1.3 (ref 0.8–1.2)
PROTHROMBIN TIME: 15.5 SEC (ref 11.5–15.2)

## 2019-01-16 PROCEDURE — 74011250636 HC RX REV CODE- 250/636: Performed by: ORTHOPAEDIC SURGERY

## 2019-01-16 PROCEDURE — 74011250637 HC RX REV CODE- 250/637: Performed by: PHYSICIAN ASSISTANT

## 2019-01-16 PROCEDURE — 97535 SELF CARE MNGMENT TRAINING: CPT

## 2019-01-16 PROCEDURE — 97116 GAIT TRAINING THERAPY: CPT

## 2019-01-16 PROCEDURE — 36415 COLL VENOUS BLD VENIPUNCTURE: CPT

## 2019-01-16 PROCEDURE — 85610 PROTHROMBIN TIME: CPT

## 2019-01-16 PROCEDURE — 97165 OT EVAL LOW COMPLEX 30 MIN: CPT

## 2019-01-16 PROCEDURE — 74011250637 HC RX REV CODE- 250/637: Performed by: ORTHOPAEDIC SURGERY

## 2019-01-16 PROCEDURE — 74011000250 HC RX REV CODE- 250: Performed by: ORTHOPAEDIC SURGERY

## 2019-01-16 PROCEDURE — 97530 THERAPEUTIC ACTIVITIES: CPT

## 2019-01-16 PROCEDURE — 74011250637 HC RX REV CODE- 250/637: Performed by: HOSPITALIST

## 2019-01-16 RX ORDER — DOCUSATE SODIUM 100 MG/1
100 CAPSULE, LIQUID FILLED ORAL 2 TIMES DAILY
Qty: 60 CAP | Refills: 2 | Status: SHIPPED | OUTPATIENT
Start: 2019-01-16 | End: 2019-04-16

## 2019-01-16 RX ORDER — CELECOXIB 200 MG/1
200 CAPSULE ORAL EVERY 12 HOURS
Qty: 60 CAP | Refills: 2 | Status: SHIPPED | OUTPATIENT
Start: 2019-01-16 | End: 2019-04-16

## 2019-01-16 RX ORDER — LANOLIN ALCOHOL/MO/W.PET/CERES
325 CREAM (GRAM) TOPICAL 2 TIMES DAILY WITH MEALS
Qty: 60 TAB | Refills: 1 | Status: SHIPPED | OUTPATIENT
Start: 2019-01-16

## 2019-01-16 RX ORDER — WARFARIN 3 MG/1
3 TABLET ORAL DAILY
Qty: 30 TAB | Refills: 1 | Status: SHIPPED | OUTPATIENT
Start: 2019-01-16 | End: 2019-02-15

## 2019-01-16 RX ORDER — OXYCODONE AND ACETAMINOPHEN 7.5; 325 MG/1; MG/1
1-2 TABLET ORAL
Qty: 56 TAB | Refills: 0 | Status: SHIPPED | OUTPATIENT
Start: 2019-01-16

## 2019-01-16 RX ORDER — WARFARIN 7.5 MG/1
7.5 TABLET ORAL
Status: COMPLETED | OUTPATIENT
Start: 2019-01-16 | End: 2019-01-16

## 2019-01-16 RX ADMIN — ACETAMINOPHEN 1000 MG: 500 TABLET ORAL at 05:08

## 2019-01-16 RX ADMIN — Medication 10 ML: at 05:10

## 2019-01-16 RX ADMIN — OXYCODONE HYDROCHLORIDE 10 MG: 10 TABLET ORAL at 05:09

## 2019-01-16 RX ADMIN — WARFARIN SODIUM 7.5 MG: 7.5 TABLET ORAL at 12:13

## 2019-01-16 RX ADMIN — POLYETHYLENE GLYCOL 3350 17 G: 17 POWDER, FOR SOLUTION ORAL at 09:17

## 2019-01-16 RX ADMIN — CELECOXIB 200 MG: 100 CAPSULE ORAL at 05:20

## 2019-01-16 RX ADMIN — CEFAZOLIN SODIUM 2 G: 2 SOLUTION INTRAVENOUS at 09:16

## 2019-01-16 RX ADMIN — NITROFURANTOIN MONOHYDRATE AND NITROFURANTOIN MACROCRYSTALLINE 100 MG: 75; 25 CAPSULE ORAL at 09:18

## 2019-01-16 RX ADMIN — PREGABALIN 25 MG: 25 CAPSULE ORAL at 09:18

## 2019-01-16 RX ADMIN — FERROUS SULFATE TAB 325 MG (65 MG ELEMENTAL FE) 325 MG: 325 (65 FE) TAB at 09:18

## 2019-01-16 RX ADMIN — CALCIUM CARBONATE-CHOLECALCIFEROL TAB 250 MG-125 UNIT 1 TABLET: 250-125 TAB at 09:18

## 2019-01-16 RX ADMIN — STANDARDIZED SENNA CONCENTRATE AND DOCUSATE SODIUM 1 TABLET: 8.6; 5 TABLET, FILM COATED ORAL at 09:18

## 2019-01-16 RX ADMIN — PANTOPRAZOLE SODIUM 40 MG: 40 TABLET, DELAYED RELEASE ORAL at 09:18

## 2019-01-16 NOTE — PROGRESS NOTES
Mobility Intervention:  
 
  [] Pt dangled at edge of bed 
  [x] Pt assisted OOB to bedside commode 
  [] Pt assisted OOB to chair 
  [] Pt ambulated to bathroom [x] Patient was ambulated in room/hallway Assistive Device Utilized:  
  
 [x] Rolling walker 
 [] Crutches 
 [] Straight Cane 
 [] Knee immobilizer [] IV pole After Mobilization:  
 
[] Patient left in no apparent distress sitting up in chair 
[x] Patient left in no apparent distress in bed 
[x] Call bell left within reach [x] SCDs on & machine turned on 
[x] Ice applied 
[x] RN notified 
[] Caregiver present 
[] Bed alarm activated Reason patient not mobilized:  
 
 [] Patient refused 
 [] Nausea/vomiting 
 [] Low blood pressure 
 [] Drowsy/lethargic Pain Rating:  
 
[] 0 [] 1 Assistive Device:       
[] 2 [] 3 [x] 4 [] 5 
[] 6 Assistive Device:       
[] 7 
[] 8 
[] 9 [] 10 Comments:

## 2019-01-16 NOTE — TELEPHONE ENCOUNTER
Spoke with Serena Connolly and notified her of WILBERTO Cornejo's message. Serena Connolly stated another physician assistant went behind Dr. Bridgette Goldberg and placed an order for her to be outpatient. Serena Connolly states that the patient was authorized to be billed as an inpatient. She also stated she would send this to the physician advisor so the patient would be billed as an inpatient for her total hip replacement and they would most likely say to bill as authorized. No further action required at this time.

## 2019-01-16 NOTE — DISCHARGE SUMMARY
1/15/2019  5:30 AM    1/16/2019, 9:42 AM    Primary Dx:left Orthopedic / Rheumatologic: Total Hip Replacement  Secondary Dx: Etiological Diagnoses: none    HPI:  Pt has end stage OA and had failed conservative treatment. Due to the current findings and affected activity of daily living surgical intervention is indicated.   The alternatives, risks, complications as well as expected outcome were discussed, the patient understands and wishes to proceed with surgery    Past Medical History:   Diagnosis Date    Arthritis     Breast cancer ([facility] [address]     Left    Cancer ([facility] [address] 2001    Lobular situ-Left breast    Chronic pain     DVT (deep venous thrombosis) (HCC)     GERD (gastroesophageal reflux disease)     Hx of radiation therapy     Hyperlipidemia     Hypertension     no meds    Low back pain potentially associated with radiculopathy     Menopause     Osteopenia     mild    Pulmonary embolism ([facility] [address]     Radiation therapy complication     Left breast    S/P hip replacement     Right hip    Trochanteric bursitis of right hip     Wears glasses          Current Facility-Administered Medications:     albuterol (PROVENTIL VENTOLIN) nebulizer solution 2.5 mg, 2.5 mg, Nebulization, Q4H PRN, Allison Gutierrez MD    cyclobenzaprine (FLEXERIL) tablet 5 mg, 5 mg, Oral, TID PRN, Allison Gutierrez MD    budesonide-formoterol (SYMBICORT) 80-4.5 mcg inhaler, 2 Puff, Inhalation, BID RT, Allison Gutierrez MD, Stopped at 01/15/19 2000    nitrofurantoin (macrocrystal-monohydrate) (MACROBID) capsule 100 mg, 100 mg, Oral, BID, Allison Gutierrez MD, 100 mg at 01/16/19 0918    pantoprazole (PROTONIX) tablet 40 mg, 40 mg, Oral, DAILY, Allison Gutierrez MD, 40 mg at 01/16/19 0918    polyethylene glycol (MIRALAX) packet 17 g, 17 g, Oral, DAILY, Allison Gutierrez MD, 17 g at 01/16/19 0917    dextrose 5 % - 0.45% NaCl infusion, 100 mL/hr, IntraVENous, CONTINUOUS, Allison Gutierrez MD, Last Rate: 100 mL/hr at 01/15/19 2259, 100 mL/hr at 01/15/19 2259    sodium chloride (NS) flush 5-40 mL, 5-40 mL, IntraVENous, Q8H, Willie Alvarez MD, 10 mL at 01/16/19 0510    sodium chloride (NS) flush 5-40 mL, 5-40 mL, IntraVENous, PRN, Allison Gutierrez MD    ferrous sulfate tablet 325 mg, 1 Tab, Oral, BID WITH MEALS, Allison Gutierrez MD, 325 mg at 01/16/19 1833    zolpidem (AMBIEN) tablet 5 mg, 5 mg, Oral, QHS PRN, Allison Gutierrez MD    acetaminophen (TYLENOL) tablet 1,000 mg, 1,000 mg, Oral, Q6H, Allison Gutierrez MD, 1,000 mg at 01/16/19 0508    oxyCODONE IR (ROXICODONE) tablet 10-15 mg, 10-15 mg, Oral, Q3H PRN, Allison Gutierrez MD, 10 mg at 01/16/19 0509    celecoxib (CELEBREX) capsule 200 mg, 200 mg, Oral, Q12H, Allison Gutierrez MD, 200 mg at 01/16/19 0520    naloxone (NARCAN) injection 0.4 mg, 0.4 mg, IntraVENous, PRN, Allison Gutierrez MD    flumazenil (ROMAZICON) 0.1 mg/mL injection 0.2 mg, 0.2 mg, IntraVENous, PRN, Allison Gutierrez MD    ceFAZolin (ANCEF) 2g IVPB in 50 mL D5W, 2 g, IntraVENous, Q8H, Allison Gutierrez MD, Last Rate: 100 mL/hr at 01/16/19 0916, 2 g at 01/16/19 0916    ondansetron (ZOFRAN) injection 4 mg, 4 mg, IntraVENous, Q4H PRN, Allison Gutierrez MD    senna-docusate (PERICOLACE) 8.6-50 mg per tablet 1 Tab, 1 Tab, Oral, BID, Allison Gutierrez MD, 1 Tab at 01/16/19 1835    pregabalin (LYRICA) capsule 25 mg, 25 mg, Oral, BID, Allison Gutierrez MD, 25 mg at 01/16/19 7474    atorvastatin (LIPITOR) tablet 20 mg, 20 mg, Oral, QHS, Kleber Bahena MD, 20 mg at 01/15/19 2100    calcium-vitamin D (OSCAL 250) tablet 1 Tab, 1 Tab, Oral, BID, Evonne Freeman MD, 1 Tab at 01/16/19 0137      Patient has no known allergies. Physical Exam:  General A&O x3 NAD, well developed, well nourished, normal affect  Heart: S1-S2, RRR  Lungs: CTA Bilat  Abd: soft NT, ND  Ext: n/v intact    Hospital Course:    Pt. Had leftOrthopedic / Rheumatologic: Total Hip Replacement    Post -op Course:   The patient tolerated the procedure well. They were followed by internal medicine for help with medical management. Pt. Was place on Abx pre and post-op for prophylaxis against infection as well as coumadin pre and post-op for prophylaxis against DVT. Vitals signs remained stable, remained af. The wound wasclean, dry, no drainage. Pain was well controlled. Pt. Had negative calf tenderness or swelling, no evidence for DVT. Patient had PT/OT consult for evaluation and treatment. CBC  Lab Results   Component Value Date/Time    WBC 4.1 (L) 10/25/2013 09:43 AM    RBC 4.27 10/25/2013 09:43 AM    HCT 39.0 10/25/2013 09:43 AM    MCV 91.3 10/25/2013 09:43 AM    MCH 30.2 10/25/2013 09:43 AM    MCHC 33.1 10/25/2013 09:43 AM    RDW 13.7 10/25/2013 09:43 AM     Coagulation  Lab Results   Component Value Date    INR 1.3 (H) 01/16/2019    APTT 41.7 (H) 03/29/2013      Basic Metabolic Profile  Lab Results   Component Value Date     10/25/2013    CO2 25 10/25/2013    BUN 13 10/25/2013       Discharge Plan:  The patient will be d/c'd to home, total hip protocol, WBAT. She will have [facility] PT and nursing. Total joint protocol. Pt safe for homebound transfer, sp Total joint replacement. A walker, bedside commode, and shower chair will be utilized for ADL's. Follow up with Dr. Jackelyn Hutton in 10-12 days. Call with any questions or concerns.

## 2019-01-16 NOTE — TELEPHONE ENCOUNTER
She recently had an IVC filter placed by Dr. Maryann Harvey which was an out patient procedure. She was in patient and scheduled for discharge today for her recent total hip replacement.      Please call

## 2019-01-16 NOTE — PROGRESS NOTES
Problem: Mobility Impaired (Adult and Pediatric) Goal: *Acute Goals and Plan of Care (Insert Text) Physical Therapy Goals Initiated 1/15/2019 and to be accomplished within 7 day(s) 1. Patient will move from supine to sit and sit to supine  in bed with supervision/set-up. 2.  Patient will transfer from bed to chair and chair to bed with supervision/set-up using the least restrictive device. 3.  Patient will perform sit to stand with supervision/set-up. 4.  Patient will ambulate with supervision/set-up for 50 feet with the least restrictive device. 5.  Patient will ascend/descend 3 stairs with no handrail(s) and appropriate AD with minimal assistance/contact guard assist.  
Outcome: Progressing Towards Goal 
physical Therapy TREATMENT Patient: Winston Taylor (47 y.o. female) Date: 1/16/2019 Diagnosis: Osteoarthritis of left hip, unspecified osteoarthritis type [M16.12] <principal problem not specified> Procedure(s) (LRB): LEFT TOTAL HIP ARTHROPLASTY LATERAL APPROACH (Left) 1 Day Post-Op Precautions: Fall, WBAT, Total hip Chart, physical therapy assessment, plan of care and goals were reviewed. OBJECTIVE/ ASSESSMENT: 
Patient found supine HOB elevated willing to work with PT. Pt able to make good functional gains this visit w/ all mobility tasks w/ intact SLR. Pt able to perform all bed mobility at SBA level and progressed from CGA to SBA for sit <> stands from mult surfaces. Pt displayed improved LLE stabilization during gait training w/ no instance of buckling during tx. Pt amb a total of 50' this visit during tx w/ RW requiring no rest breaks and only voicing increased dizziness post stair training, which resolved w/ rest. Pt was able to improve gait quality from a step to to a step through pattern w/ cueing and distance. Pt required SBA for safety during stair training only requiring cueing for sequencing.  Pt returned to room to recliner, further reviewed hip precautions as pt voiced poor recall prior to mobility. Pt left in care of OT in recliner w/ all needs in reach. From a PT standpoint, pt is safe to return home as she exhibits safe mobility practices requiring little assistance. Pt will cont to benefit from home health PT services and will cont to follow up to further improve mobility as pt remains in hospital care. Education: 
[x]         Bed mobility [x]         Transfers 
[x]         Ambulation / gait [x]         Assistive device management 
[x]         Stairs [x]         Body mechanics [x]         Position change  
[x]         Therapeutic exercise [x]         Activity pacing / energy conservation 
[]         Other: 
 
Progression toward goals: 
[x]      Improving appropriately and progressing toward goals 
[]      Improving slowly and progressing toward goals 
[]      Not making progress toward goals and plan of care will be adjusted PLAN: 
Patient continues to benefit from skilled intervention to address the above impairments. Continue treatment per established plan of care. Discharge Recommendations:  Home Health Further Equipment Recommendations for Discharge:  rolling walker SUBJECTIVE:  
Patient stated Venkata Hopson had my last one done here too. \" referring to previous hip replacement OBJECTIVE DATA SUMMARY:  
Critical Behavior: 
Neurologic State: Alert Orientation Level: Oriented X4 Cognition: Follows commands Functional Mobility Training: 
Bed Mobility: 
Rolling: Stand-by assistance Supine to Sit: Stand-by assistance Transfers: 
Sit to Stand: Contact guard assistance;Stand-by assistance Stand to Sit: Stand-by assistance Balance: 
Sitting: Intact Standing: Intact; With support Standing - Static: Good Standing - Dynamic : GoodAmbulation/Gait Training: 
Distance (ft): 50 Feet (ft) Assistive Device: Walker, rolling Ambulation - Level of Assistance: Stand-by assistance Gait Abnormalities: Decreased step clearance; Step to gait Base of Support: Center of gravity altered Speed/Britta: Pace decreased (<100 feet/min) Stairs: 
Number of Stairs Trained: 4 Stairs - Level of Assistance: Stand-by assistance Rail Use: Both 
Pain: did not voice number, voiced minimal to no pain t/o tx Activity Tolerance:  
Good Please refer to the flowsheet for vital signs taken during this treatment. After treatment:  
[x] Patient left in no apparent distress sitting up in chair 
[] Patient left in no apparent distress in bed 
[x] Call bell left within reach [x] Nursing notified 
[x] OT present 
[] Bed alarm activated 
[] SCDs applied 
[] Ice applied Angel Halo, PTA Time Calculation: 24 mins Mobility [id number] Current  CI= 1-19%. The severity rating is based on the Level of Assistance required for Functional Mobility and ADLs. Mobility   Goal  CI= 1-19%. The severity rating is based on the Level of Assistance required for Functional Mobility and ADLs.

## 2019-01-16 NOTE — ROUTINE PROCESS
2010 Patient voided 150ml , Bladder scanned again, reading 680 ml. Patient Straight Cath, tolerated procedure.

## 2019-01-16 NOTE — TELEPHONE ENCOUNTER
Pt had sx on 1/15/19 and is scheduled to be discharged today, however there is an order in her chart from Dr. Jorge Luis Zambrano that states she is to be outpatient due to a pulmonary embolism. Nickie Patel from [facility] utilization review is calling because she thinks the order from Dr. Kristie Castillo is in the incorrect chart but because it is the \"last\" order in the chart, they have to go by that order unless she receives confirmation from Dr. Mehdi Urban that it is/was in fact supposed to be inpatient so that insurance can be billed properlty.   Nickie Patel can be reached at [phone]

## 2019-01-16 NOTE — PROGRESS NOTES
Ortho Pt. Seen and evaluated. Doing well, up in chair, pain well controlled Progressed well with PT Denies cp, sob, abd pain Visit Vitals /70 (BP 1 Location: Right arm) Pulse (!) 56 Temp 97.5 °F (36.4 °C) Resp 18 Ht 5' 7\" (1.702 m) Wt 219 lb 8 oz (99.6 kg) SpO2 98% Breastfeeding? No  
BMI 33.37 kg/m²  
   
left total hip replacement 
left hip Woundclean, dry, no drainage Sensory intact to LT Motor intact 
nv intact Neg calf tenderness. Labs. CBC 
@ 
CBC:  
Lab Results Component Value Date/Time WBC 4.1 (L) 10/25/2013 09:43 AM  
 RBC 4.27 10/25/2013 09:43 AM  
 HGB 12.9 10/25/2013 09:43 AM  
 HCT 39.0 10/25/2013 09:43 AM  
 PLATELET 829 00/81/5192 09:43 AM  
 and BMP:  
Lab Results Component Value Date/Time Glucose 103 (H) 10/25/2013 09:43 AM  
 Sodium 139 10/25/2013 09:43 AM  
 Potassium 3.6 10/25/2013 09:43 AM  
 Chloride 104 10/25/2013 09:43 AM  
 CO2 25 10/25/2013 09:43 AM  
 BUN 13 10/25/2013 09:43 AM  
 Creatinine 1.00 10/25/2013 09:43 AM  
 Calcium 9.6 10/25/2013 09:43 AM  
@ Coagulation Lab Results Component Value Date INR 1.3 (H) 01/16/2019 APTT 41.7 (H) 03/29/2013 Basic Metabolic Profile Lab Results Component Value Date  10/25/2013 CO2 25 10/25/2013 BUN 13 10/25/2013 Assesment:leftOrthopedic / Rheumatologic: Total Hip Replacement Past Medical History:  
Diagnosis Date  Arthritis  Breast cancer ([address] Left  Cancer ([address] 2001 Lobular situ-Left breast  
 Chronic pain  DVT (deep venous thrombosis) ([address]  GERD (gastroesophageal reflux disease)  Hx of radiation therapy  Hyperlipidemia  Hypertension   
 no meds  Low back pain potentially associated with radiculopathy  Menopause  Osteopenia   
 mild  Pulmonary embolism ([address]  Radiation therapy complication Left breast  
 S/P hip replacement Right hip  Trochanteric bursitis of right hip  Wears glasses ASA: 3 Pt is status post joint replacement and at risk for bleeding, blood clots, and infection. Plan:  Coumadin, PT, DC to home if cleared by PT and ok with medicine.

## 2019-01-16 NOTE — DISCHARGE INSTRUCTIONS
DISCHARGE SUMMARY from Nurse    PATIENT INSTRUCTIONS:    After general anesthesia or intravenous sedation, for 24 hours or while taking prescription Narcotics:  · Limit your activities  · Do not drive and operate hazardous machinery  · Do not make important personal or business decisions  · Do  not drink alcoholic beverages  · If you have not urinated within 8 hours after discharge, please contact your surgeon on call. Report the following to your surgeon:  · Excessive pain, swelling, redness or odor of or around the surgical area  · Temperature over 100.5  · Nausea and vomiting lasting longer than 4 hours or if unable to take medications  · Any signs of decreased circulation or nerve impairment to extremity: change in color, persistent  numbness, tingling, coldness or increase pain  · Any questions    What to do at Home:  Recommended activity: Activity as tolerated,     If you experience any of the following symptoms fever, chills, shortness of breath , please follow up with md.    *  Please give a list of your current medications to your Primary Care Provider. *  Please update this list whenever your medications are discontinued, doses are      changed, or new medications (including over-the-counter products) are added. *  Please carry medication information at all times in case of emergency situations. These are general instructions for a healthy lifestyle:    No smoking/ No tobacco products/ Avoid exposure to second hand smoke  Surgeon General's Warning:  Quitting smoking now greatly reduces serious risk to your health.     Obesity, smoking, and sedentary lifestyle greatly increases your risk for illness    A healthy diet, regular physical exercise & weight monitoring are important for maintaining a healthy lifestyle    You may be retaining fluid if you have a history of heart failure or if you experience any of the following symptoms:  Weight gain of 3 pounds or more overnight or 5 pounds in a week, increased swelling in our hands or feet or shortness of breath while lying flat in bed. Please call your doctor as soon as you notice any of these symptoms; do not wait until your next office visit. Recognize signs and symptoms of STROKE:    F-face looks uneven    A-arms unable to move or move unevenly    S-speech slurred or non-existent    T-time-call 911 as soon as signs and symptoms begin-DO NOT go       Back to bed or wait to see if you get better-TIME IS BRAIN. Warning Signs of HEART ATTACK     Call 911 if you have these symptoms:   Chest discomfort. Most heart attacks involve discomfort in the center of the chest that lasts more than a few minutes, or that goes away and comes back. It can feel like uncomfortable pressure, squeezing, fullness, or pain.  Discomfort in other areas of the upper body. Symptoms can include pain or discomfort in one or both arms, the back, neck, jaw, or stomach.  Shortness of breath with or without chest discomfort.  Other signs may include breaking out in a cold sweat, nausea, or lightheadedness. Don't wait more than five minutes to call 911 - MINUTES MATTER! Fast action can save your life. Calling 911 is almost always the fastest way to get lifesaving treatment. Emergency Medical Services staff can begin treatment when they arrive -- up to an hour sooner than if someone gets to the hospital by car. The discharge information has been reviewed with the patient. The patient verbalized understanding. Discharge medications reviewed with the patient and appropriate educational materials and side effects teaching were provided.   ___________________________________________________________________________________________________________________________________

## 2019-01-16 NOTE — ROUTINE PROCESS
1630: Bladder scanned patient. 660 mL of urine retained in bladder. Attempted to straight cath patient. Patient stated Naif Le wanted to eat first\". 1830: Second attempt to straight cath patient. Patient state she wanted to try to use the bathroom. 1900: Patient returned back to bed. 150 mL of urine obtained from patient. Advise the patient that straight cath was necessary since bladder scan indicated much greater urine than 150 mL. Patient express verbal understanding.

## 2019-01-16 NOTE — PROGRESS NOTES
OT order received and chart reviewed. Patient seen for skilled OT evaluation and is safe for discharge home when medically stable/cleared by PT. Full note to follow.    
 
Thank you for the referral.   
Gabe Antoine, MS, OTR/L

## 2019-01-16 NOTE — PROGRESS NOTES
Problem: Self Care Deficits Care Plan (Adult) Goal: *Acute Goals and Plan of Care (Insert Text) Outcome: Resolved/Met Date Met: 01/16/19 Occupational Therapy EVALUATION/discharge Patient: Casandra Chapa (18 y.o. female) Date: 1/16/2019 Primary Diagnosis: Osteoarthritis of left hip, unspecified osteoarthritis type [M16.12] Procedure(s) (LRB): LEFT TOTAL HIP ARTHROPLASTY LATERAL APPROACH (Left) 1 Day Post-Op Precautions:  Fall, WBAT 
 
ASSESSMENT AND RECOMMENDATIONS: 
Based on the objective data described below, the patient presents she is motivated to return home as she is (I) with basic self-care/ADL tasks, however requires (S) for bathing, LB dressing, and toileting tasks. Pt demonstrates she is able to ambulate to the bathroom with use of a RW with supervision. However, will defer to PT for functional balance and mobility tasks. Issued and educated pt on the use of a reacher (to doff socks and don pants), sock aid (to don socks), long handled sponge (for LB bathing), and long handled shoe horn (to don shoes) for pt to follow hip precautions. Recommended pt to have a tub bench and grab bars for pt to safely transfer in/out of tub, follow hip precautions, and decrease the risk of falls, however it is unknown whether pt will buy equipment. Educated pt on the role of OT, adaptive equipment (mentioned above), and hip precautions with pt demonstrating good understanding. The pt has a supportive  at home to assist PRN. Skilled occupational therapy is not indicated at this time. Discharge Recommendations: Home Health Further Equipment Recommendations for Discharge: Tub bench, grab bars for pt to safely get in/out of shower/tub, and a raised toilet for pt to abide by hip precautions. Barriers to Learning/Limitations: None Compensate with: visual, verbal, tactile, kinesthetic cues/model COMPLEXITY Eval Complexity: History: LOW Complexity : Brief history review ; Examination: LOW Complexity : 1-3 performance deficits relating to physical, cognitive , or psychosocial skils that result in activity limitations and / or participation restrictions ; Decision Making:LOW Complexity : No comorbidities that affect functional and no verbal or physical assistance needed to complete eval tasks  Assessment: Low Complexity G-CODES:  
 
Self Care  Current  CI= 1-19%  Goal  CI= 1-19%  D/C  CI= 1-19%. The severity rating is based on the Level of Assistance required for Functional Mobility and ADLs. SUBJECTIVE:  
Patient stated My  can help me OBJECTIVE DATA SUMMARY:  
 
Past Medical History:  
Diagnosis Date  Arthritis  Breast cancer ([facility] [address] Left  Cancer ([facility] [address] 2001 Lobular situ-Left breast  
 Chronic pain  DVT (deep venous thrombosis) ([facility] [address]  GERD (gastroesophageal reflux disease)  Hx of radiation therapy  Hyperlipidemia  Hypertension   
 no meds  Low back pain potentially associated with radiculopathy  Menopause  Osteopenia   
 mild  Pulmonary embolism ([facility] [address]  Radiation therapy complication Left breast  
 S/P hip replacement Right hip  Trochanteric bursitis of right hip  Wears glasses Past Surgical History:  
Procedure Laterality Date  HX HIP REPLACEMENT  2/12/2003 Right hip  HX MASTECTOMY  2001 Left partial  
 HX OTHER SURGICAL Bilateral 2014  
 bilat styes on eyes [id number] [facility] Prior Level of Function/Home Situation: Pt reports lives with her  and was (I) with ADLs and IADLs (cooking, cleaning, laundry, grocery shopping, and driving) PTA. Home Situation Home Environment: Private residence # Steps to Enter: 3 Rails to Enter: No 
One/Two Story Residence: One story Living Alone: No 
Support Systems: Spouse/Significant Other/Partner Patient Expects to be Discharged to[de-identified] Private residence Current DME Used/Available at Home: Duran Servant, straight, Kathyrn Piles Tub or Shower Type: Tub/Shower combination 
[x]     Right hand dominant   []     Left hand dominantCognitive/Behavioral Status: 
Neurologic State: Alert Orientation Level: Oriented X4 Cognition: Follows commands Safety/Judgement: Fall prevention Skin: Visible skin appeared intact Edema: None noted Vision/Perceptual:   
Acuity: Able to read clock/calendar on wall without difficulty Coordination: 
Coordination: Within functional limits(BUEs) Fine Motor Skills-Upper: Left Intact; Right Intact Gross Motor Skills-Upper: Left Intact; Right Intact Balance: 
Sitting: Intact Standing: Intact; With support Standing - Static: Good Standing - Dynamic : Good Strength: 
Strength: Within functional limits(BUEs) Tone & Sensation: 
Tone: Normal(BUEs) Sensation: Intact(BUEs) Range of Motion: 
AROM: Within functional limits(BUEs) Functional Mobility and Transfers for ADLs: 
Bed Mobility: 
Rolling: Stand-by assistance Supine to Sit: Stand-by assistance Transfers: 
Sit to Stand: Supervision Bed to Chair: Supervision Toilet Transfer : Supervision Bathroom Mobility: Supervision/set up ADL Assessment: 
Feeding: Independent Oral Facial Hygiene/Grooming: Independent Bathing: Supervision Upper Body Dressing: Independent Lower Body Dressing: Supervision (Issued reacher, sock aid, and long handled shoe horn. Requires verbal cues to follow hip precautions) Toileting: Supervision ADL Intervention: 
Issued and educated pt on the use of a reacher, sock aid, long handled shoe horn, and long handled sponge. Lower Body Dressing Assistance Dressing Assistance: Supervision/set up Pants With Elastic Waist: Supervision/set-up Socks: Supervision/set-up Position Performed: Seated in chair;Standing to don pants over hips. No LOB noted. Cues: Verbal cues provided(to follow Hip precautions) Adaptive Equipment Used: Reacher to doff socks and don pants;Sock aid to don socks Cognitive Retraining Safety/Judgement: Fall prevention Pain: 
Pt reports 4/10 pain or discomfort prior to treatment.  (L hip) Pt reports 4/10 pain or discomfort post treatment. Activity Tolerance:  
Good Please refer to the flowsheet for vital signs taken during this treatment. After treatment:  
[x]  Patient left in no apparent distress sitting up in chair 
[]  Patient left in no apparent distress in bed 
[x]  Call bell left within reach [x]  Nursing notified 
[]  Caregiver present 
[]  Bed alarm activated COMMUNICATION/EDUCATION:  
Communication/Collaboration: 
[x]      Home safety education was provided and the patient/caregiver indicated understanding. [x]      Patient/family have participated as able and agree with findings and recommendations. []      Patient is unable to participate in plan of care at this time. Paulino Duran OT Time Calculation: 23 mins

## 2019-01-16 NOTE — ROUTINE PROCESS
Bedside and Verbal shift change report given to Zuly Chan RN (oncoming nurse) by Wendy Luevano RN (offgoing nurse). Report included the following information SBAR, Kardex and MAR.

## 2019-01-16 NOTE — PROGRESS NOTES
Discharge planning Discharge order noted for today. Pt has been accepted to [facility]. Met with pt and  agreeable to the transition plan today. Transport has been arranged with daughter. P'ts discharge [facility] orders have been forwarded to cc/Link. CM will continue to monitor for transitional needs for a safe discharge from [place]. SALLY Pelaez, RN Pager # [phone] Care Manager

## 2019-01-16 NOTE — ROUTINE PROCESS
Bedside and Verbal shift change report given to Rm Pearson (oncoming nurse) by Taisha Singleton RN (offgoing nurse). Report included the following information SBAR, Kardex, OR Summary, Procedure Summary, Intake/Output, MAR and Recent Results.

## 2019-01-16 NOTE — HOME CARE
Discharge noted for today. Received home health referral for [facility] for SN and PT. Order processed and called to Iesha ORTEGA in intake.   Sharyle Richard, LPN

## 2019-01-16 NOTE — PROGRESS NOTES
Problem: Falls - Risk of 
Goal: *Absence of Falls Document Krystyna Funk Fall Risk and appropriate interventions in the flowsheet. Outcome: Progressing Towards Goal 
Fall Risk Interventions: 
Mobility Interventions: Patient to call before getting OOB Medication Interventions: Patient to call before getting OOB

## 2019-01-17 ENCOUNTER — HOME CARE VISIT (OUTPATIENT)
Dept: SCHEDULING | Facility: HOME HEALTH | Age: 64
End: 2019-01-17
Payer: COMMERCIAL

## 2019-01-17 ENCOUNTER — TELEPHONE (OUTPATIENT)
Dept: ORTHOPEDIC SURGERY | Facility: CLINIC | Age: 64
End: 2019-01-17

## 2019-01-17 VITALS
SYSTOLIC BLOOD PRESSURE: 128 MMHG | RESPIRATION RATE: 16 BRPM | HEART RATE: 58 BPM | TEMPERATURE: 99 F | DIASTOLIC BLOOD PRESSURE: 80 MMHG | OXYGEN SATURATION: 96 %

## 2019-01-17 PROCEDURE — G0151 HHCP-SERV OF PT,EA 15 MIN: HCPCS

## 2019-01-17 PROCEDURE — G0299 HHS/HOSPICE OF RN EA 15 MIN: HCPCS

## 2019-01-17 PROCEDURE — 400013 HH SOC

## 2019-01-17 NOTE — TELEPHONE ENCOUNTER
Valerio Sanz with [address] called with a PT/INR result. The PT is 34.3, INR is 2.9, and the patient is currently taking 3 mg of Coumadin daily. Please advise Harman Stiles back at [phone].

## 2019-01-17 NOTE — ROUTINE PROCESS
Pt d/cd to home. IV d/cd no signs of infection noted. Pt verified that each sheet matched discharge instructions. Pt verbalized understanding about d/cd instructions. Pt d/cd to home. Pt stable.

## 2019-01-18 ENCOUNTER — HOME CARE VISIT (OUTPATIENT)
Dept: HOME HEALTH SERVICES | Facility: HOME HEALTH | Age: 64
End: 2019-01-18
Payer: COMMERCIAL

## 2019-01-18 ENCOUNTER — HOME CARE VISIT (OUTPATIENT)
Dept: SCHEDULING | Facility: HOME HEALTH | Age: 64
End: 2019-01-18
Payer: COMMERCIAL

## 2019-01-18 PROCEDURE — G0157 HHC PT ASSISTANT EA 15: HCPCS

## 2019-01-19 ENCOUNTER — HOME CARE VISIT (OUTPATIENT)
Dept: SCHEDULING | Facility: HOME HEALTH | Age: 64
End: 2019-01-19
Payer: COMMERCIAL

## 2019-01-19 VITALS
DIASTOLIC BLOOD PRESSURE: 74 MMHG | HEART RATE: 110 BPM | OXYGEN SATURATION: 98 % | SYSTOLIC BLOOD PRESSURE: 118 MMHG | TEMPERATURE: 98.7 F

## 2019-01-19 VITALS
OXYGEN SATURATION: 88 % | HEART RATE: 63 BPM | DIASTOLIC BLOOD PRESSURE: 68 MMHG | TEMPERATURE: 97.7 F | SYSTOLIC BLOOD PRESSURE: 108 MMHG

## 2019-01-19 PROCEDURE — G0157 HHC PT ASSISTANT EA 15: HCPCS

## 2019-01-20 ENCOUNTER — HOME CARE VISIT (OUTPATIENT)
Dept: SCHEDULING | Facility: HOME HEALTH | Age: 64
End: 2019-01-20
Payer: COMMERCIAL

## 2019-01-20 PROCEDURE — G0157 HHC PT ASSISTANT EA 15: HCPCS

## 2019-01-21 ENCOUNTER — TELEPHONE (OUTPATIENT)
Dept: ORTHOPEDIC SURGERY | Facility: CLINIC | Age: 64
End: 2019-01-21

## 2019-01-21 ENCOUNTER — HOME CARE VISIT (OUTPATIENT)
Dept: SCHEDULING | Facility: HOME HEALTH | Age: 64
End: 2019-01-21
Payer: COMMERCIAL

## 2019-01-21 VITALS
DIASTOLIC BLOOD PRESSURE: 78 MMHG | TEMPERATURE: 97.8 F | SYSTOLIC BLOOD PRESSURE: 124 MMHG | HEART RATE: 51 BPM | OXYGEN SATURATION: 99 %

## 2019-01-21 PROCEDURE — G0299 HHS/HOSPICE OF RN EA 15 MIN: HCPCS

## 2019-01-21 PROCEDURE — G0157 HHC PT ASSISTANT EA 15: HCPCS

## 2019-01-21 NOTE — TELEPHONE ENCOUNTER
[address] Esthela Carlton is calling in PT/INR PT 53.4 INR 4.5. Patient is taking Coumadin 3 mg. Patient on Thursday took 1/1/2 pills, Fri took 1 1/2 pills and Sat she took 1 pill and Sun 1/1/2 pills.  Please call Jose Ritchie back at [phone]

## 2019-01-22 ENCOUNTER — HOME CARE VISIT (OUTPATIENT)
Dept: SCHEDULING | Facility: HOME HEALTH | Age: 64
End: 2019-01-22
Payer: COMMERCIAL

## 2019-01-22 ENCOUNTER — APPOINTMENT (OUTPATIENT)
Dept: GENERAL RADIOLOGY | Age: 64
End: 2019-01-22
Attending: EMERGENCY MEDICINE
Payer: COMMERCIAL

## 2019-01-22 ENCOUNTER — APPOINTMENT (OUTPATIENT)
Dept: NUCLEAR MEDICINE | Age: 64
End: 2019-01-22
Attending: EMERGENCY MEDICINE
Payer: COMMERCIAL

## 2019-01-22 ENCOUNTER — APPOINTMENT (OUTPATIENT)
Dept: CT IMAGING | Age: 64
End: 2019-01-22
Attending: EMERGENCY MEDICINE
Payer: COMMERCIAL

## 2019-01-22 ENCOUNTER — HOSPITAL ENCOUNTER (EMERGENCY)
Age: 64
Discharge: HOME | End: 2019-01-22
Attending: EMERGENCY MEDICINE | Admitting: EMERGENCY MEDICINE
Payer: COMMERCIAL

## 2019-01-22 VITALS
OXYGEN SATURATION: 100 % | TEMPERATURE: 97.8 F | RESPIRATION RATE: 15 BRPM | SYSTOLIC BLOOD PRESSURE: 129 MMHG | DIASTOLIC BLOOD PRESSURE: 66 MMHG | HEART RATE: 58 BPM

## 2019-01-22 DIAGNOSIS — R06.02 SOB (SHORTNESS OF BREATH): Primary | ICD-10-CM

## 2019-01-22 DIAGNOSIS — R07.89 ATYPICAL CHEST PAIN: ICD-10-CM

## 2019-01-22 LAB
ALBUMIN SERPL-MCNC: 3.2 G/DL (ref 3.4–5)
ALBUMIN/GLOB SERPL: 0.8 {RATIO} (ref 0.8–1.7)
ALP SERPL-CCNC: 111 U/L (ref 45–117)
ALT SERPL-CCNC: 23 U/L (ref 13–56)
ANION GAP SERPL CALC-SCNC: 10 MMOL/L (ref 3–18)
APTT PPP: 33.2 SEC (ref 23–36.4)
AST SERPL-CCNC: 36 U/L (ref 15–37)
BASOPHILS # BLD: 0 K/UL (ref 0–0.1)
BASOPHILS NFR BLD: 0 % (ref 0–2)
BILIRUB SERPL-MCNC: 0.4 MG/DL (ref 0.2–1)
BNP SERPL-MCNC: 168 PG/ML (ref 0–900)
BUN SERPL-MCNC: 17 MG/DL (ref 7–18)
BUN/CREAT SERPL: 21 (ref 12–20)
CALCIUM SERPL-MCNC: 9.1 MG/DL (ref 8.5–10.1)
CHLORIDE SERPL-SCNC: 107 MMOL/L (ref 100–108)
CK MB CFR SERPL CALC: ABNORMAL % (ref 0–4)
CK MB CFR SERPL CALC: ABNORMAL % (ref 0–4)
CK MB SERPL-MCNC: <1 NG/ML (ref 5–25)
CK MB SERPL-MCNC: <1 NG/ML (ref 5–25)
CK SERPL-CCNC: 203 U/L (ref 26–192)
CK SERPL-CCNC: 261 U/L (ref 26–192)
CO2 SERPL-SCNC: 24 MMOL/L (ref 21–32)
CREAT SERPL-MCNC: 0.82 MG/DL (ref 0.6–1.3)
DIFFERENTIAL METHOD BLD: ABNORMAL
EOSINOPHIL # BLD: 0.7 K/UL (ref 0–0.4)
EOSINOPHIL NFR BLD: 14 % (ref 0–5)
ERYTHROCYTE [DISTWIDTH] IN BLOOD BY AUTOMATED COUNT: 14.1 % (ref 11.6–14.5)
GLOBULIN SER CALC-MCNC: 3.9 G/DL (ref 2–4)
GLUCOSE SERPL-MCNC: 74 MG/DL (ref 74–99)
HCT VFR BLD AUTO: 35.9 % (ref 35–45)
HGB BLD-MCNC: 11.7 G/DL (ref 12–16)
INR PPP: 2.4 (ref 0.8–1.2)
LYMPHOCYTES # BLD: 1.6 K/UL (ref 0.9–3.6)
LYMPHOCYTES NFR BLD: 32 % (ref 21–52)
MAGNESIUM SERPL-MCNC: 2.2 MG/DL (ref 1.6–2.6)
MCH RBC QN AUTO: 29.5 PG (ref 24–34)
MCHC RBC AUTO-ENTMCNC: 32.6 G/DL (ref 31–37)
MCV RBC AUTO: 90.7 FL (ref 74–97)
MONOCYTES # BLD: 0.4 K/UL (ref 0.05–1.2)
MONOCYTES NFR BLD: 9 % (ref 3–10)
NEUTS SEG # BLD: 2.2 K/UL (ref 1.8–8)
NEUTS SEG NFR BLD: 45 % (ref 40–73)
PLATELET # BLD AUTO: 315 K/UL (ref 135–420)
PMV BLD AUTO: 9.8 FL (ref 9.2–11.8)
POTASSIUM SERPL-SCNC: 3.9 MMOL/L (ref 3.5–5.5)
PROT SERPL-MCNC: 7.1 G/DL (ref 6.4–8.2)
PROTHROMBIN TIME: 26.4 SEC (ref 11.5–15.2)
RBC # BLD AUTO: 3.96 M/UL (ref 4.2–5.3)
SODIUM SERPL-SCNC: 141 MMOL/L (ref 136–145)
TROPONIN I SERPL-MCNC: <0.02 NG/ML (ref 0–0.04)
TROPONIN I SERPL-MCNC: <0.02 NG/ML (ref 0–0.04)
WBC # BLD AUTO: 5 K/UL (ref 4.6–13.2)

## 2019-01-22 PROCEDURE — 94640 AIRWAY INHALATION TREATMENT: CPT

## 2019-01-22 PROCEDURE — 82550 ASSAY OF CK (CPK): CPT

## 2019-01-22 PROCEDURE — 85610 PROTHROMBIN TIME: CPT

## 2019-01-22 PROCEDURE — 85730 THROMBOPLASTIN TIME PARTIAL: CPT

## 2019-01-22 PROCEDURE — 83880 ASSAY OF NATRIURETIC PEPTIDE: CPT

## 2019-01-22 PROCEDURE — 74011250637 HC RX REV CODE- 250/637: Performed by: EMERGENCY MEDICINE

## 2019-01-22 PROCEDURE — 83735 ASSAY OF MAGNESIUM: CPT

## 2019-01-22 PROCEDURE — 71045 X-RAY EXAM CHEST 1 VIEW: CPT

## 2019-01-22 PROCEDURE — 93005 ELECTROCARDIOGRAM TRACING: CPT

## 2019-01-22 PROCEDURE — 80053 COMPREHEN METABOLIC PANEL: CPT

## 2019-01-22 PROCEDURE — A9567 TECHNETIUM TC-99M AEROSOL: HCPCS

## 2019-01-22 PROCEDURE — 99285 EMERGENCY DEPT VISIT HI MDM: CPT

## 2019-01-22 PROCEDURE — A6213 FOAM DRG >16<=48 SQ IN W/BDR: HCPCS

## 2019-01-22 PROCEDURE — 85025 COMPLETE CBC W/AUTO DIFF WBC: CPT

## 2019-01-22 RX ORDER — ALBUTEROL SULFATE 90 UG/1
2 AEROSOL, METERED RESPIRATORY (INHALATION)
Status: DISCONTINUED | OUTPATIENT
Start: 2019-01-22 | End: 2019-01-23 | Stop reason: HOSPADM

## 2019-01-22 RX ADMIN — ALBUTEROL SULFATE 2 PUFF: 90 AEROSOL, METERED RESPIRATORY (INHALATION) at 21:30

## 2019-01-22 NOTE — ED PROVIDER NOTES
EMERGENCY DEPARTMENT HISTORY AND PHYSICAL EXAM 
 
12:54 PM 
 
 
Date: 1/22/2019 Patient Name: Rosie Becerra History of Presenting Illness Chief Complaint Patient presents with  Shortness of Breath History Provided By: Patient Chief Complaint: SOB Duration:  Minutes Timing:  Waxing and Waning Location: n/a Quality: Tightness Severity: N/A Modifying Factors: recent hip replacement Associated Symptoms: lightheadedness Additional History (Context): Rosie Becerra is a 58 y.o. female with PE, bilateral hip surgery who presents with mild waxing and waning SOB x 1 day associated with lightheadedness. Of note, the pt underwent a left hip replacement 1 week ago and was started on Coumadin. Yesterday was taken off Coumadin due to a supratherapeutic INR. Earlier today the pt began to feel feverish and lightheaded accompanying her shortness of breath so she came to the ED for further evaluation. The pt denies fever, chills, abdominal pain, CP, SOB, and any other associated sxs. PCP: Meggan Acevedo MD 
 
Current Facility-Administered Medications Medication Dose Route Frequency Provider Last Rate Last Dose  iopamidol (ISOVUE-370) 76 % injection 1-75 mL  1-75 mL IntraVENous RAD ONCE Yahir Lee MD      
 
Current Outpatient Medications Medication Sig Dispense Refill  celecoxib (CELEBREX) 200 mg capsule Take 1 Cap by mouth every twelve (12) hours every twelve (12) hours for 90 days. 60 Cap 2  ferrous sulfate 325 mg (65 mg iron) tablet Take 1 Tab by mouth two (2) times daily (with meals). 60 Tab 1  
 oxyCODONE-acetaminophen (PERCOCET) 7.5-325 mg per tablet Take 1-2 Tabs by mouth every six (6) hours as needed for Pain. Max Daily Amount: 8 Tabs. 56 Tab 0  
 warfarin (COUMADIN) 3 mg tablet Take 1 Tab by mouth daily for 30 days. (Patient taking differently: Take 1 Tab by mouth daily.  1.5 tabs on thursday, friday,1 tab on saturday and 1.5 tabs on sunday, recheck pt/inr on monday 1-21-19) 30 Tab 1  
 docusate sodium (COLACE) 100 mg capsule Take 1 Cap by mouth two (2) times a day for 90 days. 60 Cap 2  pantoprazole (PROTONIX) 40 mg tablet Take 40 mg by mouth daily.  cyclobenzaprine HCl (FLEXERIL PO) Take 5 mg by mouth as needed.  mometasone-formoterol (DULERA) 100-5 mcg/actuation HFA inhaler Take 2 Puffs by inhalation two (2) times daily as needed.  cetirizine (ZYRTEC) 10 mg tablet Take  by mouth daily.  guaiFENesin (MUCINEX) 1,200 mg TM12 ER tablet Take 1,200 mg by mouth as needed.  polyethylene glycol (MIRALAX) 17 gram packet Take 17 g by mouth daily.  albuterol (PROVENTIL, VENTOLIN) 90 mcg/actuation inhaler Take 1-2 Puffs by inhalation every four (4) hours as needed for Wheezing. 17 g 0  
 Inhalational Spacing Device (AEROCHAMBER) 1 Each by Does Not Apply route as needed. 1 Device 0  
 CALCIUM CITRATE/VITAMIN D3 (CALCITRATE-VITAMIN D PO) Take  by mouth.  lubiPROStone (AMITIZA) 24 mcg capsule Take 24 mcg by mouth.  acetaminophen (TYLENOL EXTRA STRENGTH) 500 mg tablet Take 500 mg by mouth every six (6) hours as needed.  atorvastatin (LIPITOR) 20 mg tablet Take 20 mg by mouth nightly. Past History Past Medical History: 
Past Medical History:  
Diagnosis Date  Arthritis  Breast cancer ([address] Left  Cancer ([address] 2001 Lobular situ-Left breast  
 Chronic pain  DVT (deep venous thrombosis) ([address]  GERD (gastroesophageal reflux disease)  Hx of radiation therapy  Hyperlipidemia  Hypertension   
 no meds  Low back pain potentially associated with radiculopathy  Menopause  Osteopenia   
 mild  Pulmonary embolism ([address]  Radiation therapy complication Left breast  
 S/P hip replacement Right hip  Trochanteric bursitis of right hip  Wears glasses Past Surgical History: Past Surgical History:  
Procedure Laterality Date  HX HIP REPLACEMENT  2/12/2003 Right hip  HX MASTECTOMY  2001 Left partial  
 HX OTHER SURGICAL Bilateral 2014  
 bilat styes on eyes [address] Family History: 
Family History Problem Relation Age of Onset  Asthma Other  Arthritis-osteo Other  Breast Cancer Maternal Aunt  Diabetes Mother  Heart Disease Mother  Alzheimer Mother  Diabetes Sister  Hypertension Sister  COPD Sister  Diabetes Brother  Hypertension Brother Social History: 
Social History Tobacco Use  Smoking status: Former Smoker Packs/day: 1.00 Years: 20.00 Pack years: 20.00 Types: Cigarettes  Smokeless tobacco: Never Used  Tobacco comment: QUIT APPROX 2000 Substance Use Topics  Alcohol use: No  
  Frequency: Never Comment: HOLIDAYS  Drug use: No  
 
 
Allergies: 
No Known Allergies Review of Systems Review of Systems Constitutional: Negative for activity change, fatigue and fever. HENT: Negative for congestion and rhinorrhea. Eyes: Negative for visual disturbance. Respiratory: Positive for shortness of breath. Cardiovascular: Negative for chest pain and palpitations. Gastrointestinal: Negative for abdominal pain, diarrhea, nausea and vomiting. Genitourinary: Negative for dysuria and hematuria. Musculoskeletal: Negative for back pain. Skin: Negative for rash. Neurological: Positive for light-headedness. Negative for dizziness and weakness. All other systems reviewed and are negative. Physical Exam  
 
Visit Vitals /62 (BP 1 Location: Right arm, BP Patient Position: At rest) Pulse 62 Temp 97.8 °F (36.6 °C) Resp 22 SpO2 100% Physical Exam  
Constitutional: She is oriented to person, place, and time. She appears well-developed and well-nourished. No distress. HENT:  
Head: Normocephalic and atraumatic. Right Ear: External ear normal.  
Left Ear: External ear normal.  
Nose: Nose normal.  
Mouth/Throat: Oropharynx is clear and moist.  
Eyes: Conjunctivae and EOM are normal. Pupils are equal, round, and reactive to light. No scleral icterus. Neck: Normal range of motion. Neck supple. No JVD present. No tracheal deviation present. No thyromegaly present. Cardiovascular: Normal rate, regular rhythm, normal heart sounds and intact distal pulses. Exam reveals no gallop and no friction rub. No murmur heard. Pulmonary/Chest: Effort normal and breath sounds normal. She exhibits no tenderness. Abdominal: Soft. Bowel sounds are normal. She exhibits no distension. There is no tenderness. There is no rebound and no guarding. Musculoskeletal: Normal range of motion. She exhibits no edema or tenderness. L hip with sx scarring no drainage, or calf pain Lymphadenopathy:  
  She has no cervical adenopathy. Neurological: She is alert and oriented to person, place, and time. No cranial nerve deficit. Coordination normal.  
No sensory loss, Gait not observed, Motor intact UE/RLE L LE limited due pain Skin: Skin is warm and dry. Psychiatric: She has a normal mood and affect. Her behavior is normal. Judgment and thought content normal.  
Nursing note and vitals reviewed. Diagnostic Study Results Labs - Recent Results (from the past 12 hour(s)) EKG, 12 LEAD, INITIAL Collection Time: 01/22/19  1:01 PM  
Result Value Ref Range Ventricular Rate 67 BPM  
 Atrial Rate 67 BPM  
 P-R Interval 80 ms QRS Duration 80 ms  
 Q-T Interval 404 ms QTC Calculation (Bezet) 426 ms Calculated P Axis 22 degrees Calculated R Axis 7 degrees Calculated T Axis 12 degrees Diagnosis Sinus rhythm with short OR Otherwise normal ECG When compared with ECG of 02-JAN-2019 07:20, No significant change was found CBC WITH AUTOMATED DIFF  Collection Time: 01/22/19  1:19 PM  
 Result Value Ref Range WBC 5.0 4.6 - 13.2 K/uL  
 RBC 3.96 (L) 4.20 - 5.30 M/uL  
 HGB 11.7 (L) 12.0 - 16.0 g/dL HCT 35.9 35.0 - 45.0 % MCV 90.7 74.0 - 97.0 FL  
 MCH 29.5 24.0 - 34.0 PG  
 MCHC 32.6 31.0 - 37.0 g/dL  
 RDW 14.1 11.6 - 14.5 % PLATELET 139 342 - 985 K/uL MPV 9.8 9.2 - 11.8 FL  
 NEUTROPHILS 45 40 - 73 % LYMPHOCYTES 32 21 - 52 % MONOCYTES 9 3 - 10 % EOSINOPHILS 14 (H) 0 - 5 % BASOPHILS 0 0 - 2 %  
 ABS. NEUTROPHILS 2.2 1.8 - 8.0 K/UL  
 ABS. LYMPHOCYTES 1.6 0.9 - 3.6 K/UL  
 ABS. MONOCYTES 0.4 0.05 - 1.2 K/UL  
 ABS. EOSINOPHILS 0.7 (H) 0.0 - 0.4 K/UL  
 ABS. BASOPHILS 0.0 0.0 - 0.1 K/UL  
 DF AUTOMATED METABOLIC PANEL, COMPREHENSIVE Collection Time: 01/22/19  1:19 PM  
Result Value Ref Range Sodium 141 136 - 145 mmol/L Potassium 3.9 3.5 - 5.5 mmol/L Chloride 107 100 - 108 mmol/L  
 CO2 24 21 - 32 mmol/L Anion gap 10 3.0 - 18 mmol/L Glucose 74 74 - 99 mg/dL BUN 17 7.0 - 18 MG/DL Creatinine 0.82 0.6 - 1.3 MG/DL  
 BUN/Creatinine ratio 21 (H) 12 - 20 GFR est AA >60 >60 ml/min/1.73m2 GFR est non-AA >60 >60 ml/min/1.73m2 Calcium 9.1 8.5 - 10.1 MG/DL Bilirubin, total 0.4 0.2 - 1.0 MG/DL  
 ALT (SGPT) 23 13 - 56 U/L  
 AST (SGOT) 36 15 - 37 U/L Alk. phosphatase 111 45 - 117 U/L Protein, total 7.1 6.4 - 8.2 g/dL Albumin 3.2 (L) 3.4 - 5.0 g/dL Globulin 3.9 2.0 - 4.0 g/dL A-G Ratio 0.8 0.8 - 1.7 CARDIAC PANEL,(CK, CKMB & TROPONIN) Collection Time: 01/22/19  1:19 PM  
Result Value Ref Range  (H) 26 - 192 U/L  
 CK - MB <1.0 <3.6 ng/ml CK-MB Index  0.0 - 4.0 % CALCULATION NOT PERFORMED WHEN RESULT IS BELOW LINEAR LIMIT Troponin-I, QT <0.02 0.0 - 0.045 NG/ML  
MAGNESIUM Collection Time: 01/22/19  1:19 PM  
Result Value Ref Range Magnesium 2.2 1.6 - 2.6 mg/dL NT-PRO BNP Collection Time: 01/22/19  1:19 PM  
Result Value Ref Range  NT pro- 0 - 900 PG/ML  
PROTHROMBIN TIME + INR  
 Collection Time: 01/22/19  1:19 PM  
Result Value Ref Range Prothrombin time 26.4 (H) 11.5 - 15.2 sec INR 2.4 (H) 0.8 - 1.2 PTT Collection Time: 01/22/19  1:19 PM  
Result Value Ref Range aPTT 33.2 23.0 - 36.4 SEC CARDIAC PANEL,(CK, CKMB & TROPONIN) Collection Time: 01/22/19  7:18 PM  
Result Value Ref Range  (H) 26 - 192 U/L  
 CK - MB <1.0 <3.6 ng/ml CK-MB Index  0.0 - 4.0 % CALCULATION NOT PERFORMED WHEN RESULT IS BELOW LINEAR LIMIT Troponin-I, QT <0.02 0.0 - 0.045 NG/ML Radiologic Studies -  
NM LUNG PERFUSION W VENT Final Result IMPRESSION:  
1. Low probability ventilation perfusion scan. XR CHEST PORT Final Result Impression:  No acute radiographic cardiopulmonary findings. Medical Decision Making I am the first provider for this patient. I reviewed the vital signs, available nursing notes, past medical history, past surgical history, family history and social history. Vital Signs-Reviewed the patient's vital signs. Pulse Oximetry Analysis -  100 on room air (Interpretation)normal 
 
Cardiac Monitor: 
Rate: 66 Rhythm:  Normal Sinus Rhythm EKG: Interpreted by the EP. Time Interpreted: 2733 Rate: 67 Rhythm: Normal Sinus Rhythm Interpretation: No STEMI Records Reviewed: Nursing Notes and Old Medical Records (Time of Review: 12:54 PM) 
 
ED Course: Progress Notes, Reevaluation, and Consults: 
PT IV infiltrated x 2 for CTA. L arm cannot be used due to breast surgery. Will proceed with VQ and repeat her cardiac panel. Yvette Driver DO 5:11 PM 
 
Pt VQ is low probability and she has 2 negative cardiac markers. Will proceed with close outpatient care and have given her an albuterol inhaler because she does not have one. She will follow with Dr. Sabina Kwon tomorrow and to return if at all worsened or concerned. Yvette Driver DO 9:02 PM 
 
 
Provider Notes (Medical Decision Making): JERE 
 Number of Diagnoses or Management Options Diagnosis management comments: Pt is a 57yo female with a hx of total hip replacement L done one week ago by Dr. Erinn Regan, hx of DVT, PE, s/p IVC filter, on coumadin now, hx of breast cancer, HTN, and chronic pain presents to the ED with complaint dyspnea and feeling fatigued/warm that started today. She is concerned she has a PE and has been on coumadin that was held due to a high INR. Will follow cardiac labs, CXR, renal function and have a low threshold for PE imaging. Basim Frost, DO 1:47 PM 
 
 
 
Diagnosis Clinical Impression:  
1. SOB (shortness of breath) 2. Atypical chest pain Disposition: DC Follow-up Information Follow up With Specialties Details Why Contact Info [facility] EMERGENCY DEPT Emergency Medicine Go to If symptoms worsen, As needed Aaron [address] Caro Burdick MD General Practice Schedule an appointment as soon as possible for a visit in 2 days For Emergency Department follow up [address] [address] 
[phone] Medication List  
  
CHANGE how you take these medications   
warfarin 3 mg tablet Commonly known as:  COUMADIN Take 1 Tab by mouth daily for 30 days. What changed:  additional instructions CONTINUE taking these medications   
albuterol 90 mcg/actuation inhaler Commonly known as:  Samina Moore Take 1-2 Puffs by inhalation every four (4) hours as needed for Wheezing. AMITIZA 24 mcg capsule Generic drug:  lubiPROStone CALCITRATE-VITAMIN D PO 
  
celecoxib 200 mg capsule Commonly known as:  CELEBREX Take 1 Cap by mouth every twelve (12) hours every twelve (12) hours for 90 days. cetirizine 10 mg tablet Commonly known as:  ZYRTEC 
  
docusate sodium 100 mg capsule Commonly known as:  Lylia Sella Take 1 Cap by mouth two (2) times a day for 90 days. DULERA 100-5 mcg/actuation HFA inhaler Generic drug:  mometasone-formoterol 
  
ferrous sulfate 325 mg (65 mg iron) tablet Take 1 Tab by mouth two (2) times daily (with meals). FLEXERIL PO 
  
inhalational spacing device Commonly known as:  AEROCHAMBER 
1 Each by Does Not Apply route as needed. LIPITOR 20 mg tablet Generic drug:  atorvastatin MIRALAX 17 gram packet Generic drug:  polyethylene glycol MUCINEX 1,200 mg Ta12 ER tablet Generic drug:  guaiFENesin 
  
oxyCODONE-acetaminophen 7.5-325 mg per tablet Commonly known as:  PERCOCET Take 1-2 Tabs by mouth every six (6) hours as needed for Pain. Max Daily Amount: 8 Tabs. PROTONIX 40 mg tablet Generic drug:  pantoprazole TYLENOL EXTRA STRENGTH 500 mg tablet Generic drug:  acetaminophen 
  
  
 
_______________________________ Scribe Attestation Jesús Chamorro acting as a scribe for and in the presence of Janae Treadwell MD     
January 22, 2019 at 12:54 PM 
    
Provider Attestation:     
I personally performed the services described in the documentation, reviewed the documentation, as recorded by the scribe in my presence, and it accurately and completely records my words and actions. January 22, 2019 at 12:54 PM - Janae Treadwell MD   
_______________________________

## 2019-01-22 NOTE — ED TRIAGE NOTES
Patient arrived from home c/o shortness of breath x1 day. Patient states she had hip surgery last Tuesday and is on blood thinners. Patient denies allergies

## 2019-01-22 NOTE — TELEPHONE ENCOUNTER
Spoke with Kyle Ryan and notified her of WILBERTO Cornejo's coumadin instructions and message. She verbalized understanding.

## 2019-01-23 VITALS
RESPIRATION RATE: 16 BRPM | DIASTOLIC BLOOD PRESSURE: 80 MMHG | HEART RATE: 58 BPM | SYSTOLIC BLOOD PRESSURE: 128 MMHG | OXYGEN SATURATION: 96 % | TEMPERATURE: 99 F

## 2019-01-23 LAB
ATRIAL RATE: 67 BPM
CALCULATED P AXIS, ECG09: 22 DEGREES
CALCULATED R AXIS, ECG10: 7 DEGREES
CALCULATED T AXIS, ECG11: 12 DEGREES
DIAGNOSIS, 93000: NORMAL
P-R INTERVAL, ECG05: 80 MS
Q-T INTERVAL, ECG07: 404 MS
QRS DURATION, ECG06: 80 MS
QTC CALCULATION (BEZET), ECG08: 426 MS
VENTRICULAR RATE, ECG03: 67 BPM

## 2019-01-23 NOTE — ED NOTES
Made Dr. Uriostegui Gravely aware that the patient's dressing   to left hip  Is saturated with serosanguinous drainage

## 2019-01-23 NOTE — DISCHARGE INSTRUCTIONS

## 2019-01-24 ENCOUNTER — TELEPHONE (OUTPATIENT)
Dept: ORTHOPEDIC SURGERY | Age: 64
End: 2019-01-24

## 2019-01-24 ENCOUNTER — HOME CARE VISIT (OUTPATIENT)
Dept: HOME HEALTH SERVICES | Facility: HOME HEALTH | Age: 64
End: 2019-01-24
Payer: COMMERCIAL

## 2019-01-24 ENCOUNTER — HOME CARE VISIT (OUTPATIENT)
Dept: SCHEDULING | Facility: HOME HEALTH | Age: 64
End: 2019-01-24
Payer: COMMERCIAL

## 2019-01-24 VITALS
DIASTOLIC BLOOD PRESSURE: 74 MMHG | OXYGEN SATURATION: 98 % | SYSTOLIC BLOOD PRESSURE: 116 MMHG | HEART RATE: 66 BPM | TEMPERATURE: 97.8 F

## 2019-01-24 PROCEDURE — G0157 HHC PT ASSISTANT EA 15: HCPCS

## 2019-01-24 PROCEDURE — G0299 HHS/HOSPICE OF RN EA 15 MIN: HCPCS

## 2019-01-24 NOTE — TELEPHONE ENCOUNTER
[place] calling in PT/INR results:    PT - 21.4  INR - 1.8    Holding Coumadin since Monday. Patient has 3 mg tabs at home. [place] can be reached at [phone] if needed.

## 2019-01-25 ENCOUNTER — HOME CARE VISIT (OUTPATIENT)
Dept: SCHEDULING | Facility: HOME HEALTH | Age: 64
End: 2019-01-25
Payer: COMMERCIAL

## 2019-01-25 VITALS
TEMPERATURE: 98 F | OXYGEN SATURATION: 98 % | HEART RATE: 72 BPM | DIASTOLIC BLOOD PRESSURE: 72 MMHG | RESPIRATION RATE: 16 BRPM | SYSTOLIC BLOOD PRESSURE: 130 MMHG

## 2019-01-25 PROCEDURE — G0157 HHC PT ASSISTANT EA 15: HCPCS

## 2019-01-28 ENCOUNTER — HOME CARE VISIT (OUTPATIENT)
Dept: HOME HEALTH SERVICES | Facility: HOME HEALTH | Age: 64
End: 2019-01-28
Payer: COMMERCIAL

## 2019-01-28 ENCOUNTER — TELEPHONE (OUTPATIENT)
Dept: ORTHOPEDIC SURGERY | Age: 64
End: 2019-01-28

## 2019-01-28 ENCOUNTER — HOME CARE VISIT (OUTPATIENT)
Dept: SCHEDULING | Facility: HOME HEALTH | Age: 64
End: 2019-01-28
Payer: COMMERCIAL

## 2019-01-28 VITALS
DIASTOLIC BLOOD PRESSURE: 78 MMHG | SYSTOLIC BLOOD PRESSURE: 122 MMHG | HEART RATE: 74 BPM | TEMPERATURE: 97.8 F | DIASTOLIC BLOOD PRESSURE: 78 MMHG | SYSTOLIC BLOOD PRESSURE: 122 MMHG | SYSTOLIC BLOOD PRESSURE: 124 MMHG | OXYGEN SATURATION: 98 % | TEMPERATURE: 98.2 F | OXYGEN SATURATION: 98 % | HEART RATE: 72 BPM | OXYGEN SATURATION: 98 % | HEART RATE: 60 BPM | TEMPERATURE: 99 F | DIASTOLIC BLOOD PRESSURE: 80 MMHG

## 2019-01-28 PROCEDURE — G0300 HHS/HOSPICE OF LPN EA 15 MIN: HCPCS

## 2019-01-28 PROCEDURE — G0157 HHC PT ASSISTANT EA 15: HCPCS

## 2019-01-28 NOTE — TELEPHONE ENCOUNTER
03/24/2019 Glory QURESHI nurse called checking on PT/INR labs done called in today.   She can be reached at [phone]

## 2019-01-29 ENCOUNTER — HOME CARE VISIT (OUTPATIENT)
Dept: HOME HEALTH SERVICES | Facility: HOME HEALTH | Age: 64
End: 2019-01-29
Payer: COMMERCIAL

## 2019-01-29 NOTE — TELEPHONE ENCOUNTER
Spoke with Glory and notified her of coumadin instructions per WILBERTO Cornejo. Glory verbalized understanding.

## 2019-01-30 ENCOUNTER — HOME CARE VISIT (OUTPATIENT)
Dept: SCHEDULING | Facility: HOME HEALTH | Age: 64
End: 2019-01-30
Payer: COMMERCIAL

## 2019-01-30 PROCEDURE — G0157 HHC PT ASSISTANT EA 15: HCPCS

## 2019-01-30 PROCEDURE — A6199 ALGINATE DRSG WOUND FILLER: HCPCS

## 2019-01-31 ENCOUNTER — OFFICE VISIT (OUTPATIENT)
Dept: ORTHOPEDIC SURGERY | Facility: CLINIC | Age: 64
End: 2019-01-31

## 2019-01-31 VITALS
TEMPERATURE: 96.1 F | HEIGHT: 68 IN | BODY MASS INDEX: 33.31 KG/M2 | OXYGEN SATURATION: 97 % | RESPIRATION RATE: 18 BRPM | DIASTOLIC BLOOD PRESSURE: 63 MMHG | HEART RATE: 59 BPM | WEIGHT: 219.8 LBS | SYSTOLIC BLOOD PRESSURE: 132 MMHG

## 2019-01-31 VITALS
OXYGEN SATURATION: 98 % | SYSTOLIC BLOOD PRESSURE: 124 MMHG | HEART RATE: 60 BPM | DIASTOLIC BLOOD PRESSURE: 80 MMHG | TEMPERATURE: 98 F

## 2019-01-31 DIAGNOSIS — Z96.642 HISTORY OF TOTAL LEFT HIP REPLACEMENT: Primary | ICD-10-CM

## 2019-01-31 RX ORDER — HYDROCODONE BITARTRATE AND ACETAMINOPHEN 10; 325 MG/1; MG/1
1-2 TABLET ORAL
Qty: 56 TAB | Refills: 0 | Status: SHIPPED | OUTPATIENT
Start: 2019-01-31

## 2019-01-31 NOTE — PROGRESS NOTES
[address] [place] [address]  [phone]           Patient: Gabby Morataya                MRN: [id number]       SSN: [id number]  YOB: 1955        AGE: 58 y.o. SEX: female  Body mass index is 33.42 kg/m². PCP: Day Arreola MD  01/31/19      This office note has been dictated. REVIEW OF SYSTEMS:  Constitutional: Negative for fever, chills, weight loss and malaise/fatigue. HENT: Negative. Eyes: Negative. Respiratory: Negative. Cardiovascular: Negative. Gastrointestinal: No bowel incontinence or constipation. Genitourinary: No bladder incontinence or saddle anesthesia. Skin: Negative. Neurological: Negative. Endo/Heme/Allergies: Negative. Psychiatric/Behavioral: Negative. Musculoskeletal: As per HPI above. Past Medical History:   Diagnosis Date    Arthritis     Breast [facility] ([facility]     Left    Cancer ([facility] 2001    Lobular situ-Left breast    Chronic pain     DVT (deep venous thrombosis) ([facility])     GERD (gastroesophageal reflux disease)     Hx of radiation therapy     Hyperlipidemia     Hypertension     no meds    Low back pain potentially associated with radiculopathy     Menopause     Osteopenia     mild    Pulmonary embolism (HCC)     Radiation therapy complication     Left breast    S/P hip replacement     Right hip    Trochanteric bursitis of right hip     Wears glasses          Current Outpatient Medications:     celecoxib (CELEBREX) 200 mg capsule, Take 1 Cap by mouth every twelve (12) hours every twelve (12) hours for 90 days. , Disp: 60 Cap, Rfl: 2    ferrous sulfate 325 mg (65 mg iron) tablet, Take 1 Tab by mouth two (2) times daily (with meals). , Disp: 60 Tab, Rfl: 1    warfarin (COUMADIN) 3 mg tablet, Take 1 Tab by mouth daily for 30 days. (Patient taking differently: Take 1 Tab by mouth daily.  1.5 tabs on thursday, friday,1 tab on saturday and 1.5 tabs on sunday, recheck pt/inr on monday 1-21-19), Disp: 30 Tab, Rfl: 1    docusate sodium (COLACE) 100 mg capsule, Take 1 Cap by mouth two (2) times a day for 90 days. , Disp: 60 Cap, Rfl: 2    mometasone-formoterol (DULERA) 100-5 mcg/actuation HFA inhaler, Take 2 Puffs by inhalation two (2) times daily as needed for Other (breathing). , Disp: , Rfl:     polyethylene glycol (MIRALAX) 17 gram packet, Take 17 g by mouth daily. , Disp: , Rfl:     albuterol (PROVENTIL, VENTOLIN) 90 mcg/actuation inhaler, Take 1-2 Puffs by inhalation every four (4) hours as needed for Wheezing., Disp: 17 g, Rfl: 0    Inhalational Spacing Device (AEROCHAMBER), 1 Each by Does Not Apply route as needed. , Disp: 1 Device, Rfl: 0    atorvastatin (LIPITOR) 20 mg tablet, Take 20 mg by mouth nightly., Disp: , Rfl:     oxyCODONE-acetaminophen (PERCOCET) 7.5-325 mg per tablet, Take 1-2 Tabs by mouth every six (6) hours as needed for Pain. Max Daily Amount: 8 Tabs., Disp: 56 Tab, Rfl: 0    pantoprazole (PROTONIX) 40 mg tablet, Take 40 mg by mouth daily. , Disp: , Rfl:     cyclobenzaprine HCl (FLEXERIL PO), Take 5 mg by mouth as needed. , Disp: , Rfl:     cetirizine (ZYRTEC) 10 mg tablet, Take 10 mg by mouth daily. take 1 tab daily, Disp: , Rfl:     guaiFENesin (MUCINEX) 1,200 mg TM12 ER tablet, Take 1,200 mg by mouth as needed. , Disp: , Rfl:     CALCIUM CITRATE/VITAMIN D3 (CALCITRATE-VITAMIN D PO), Take  by mouth., Disp: , Rfl:     lubiPROStone (AMITIZA) 24 mcg capsule, Take 24 mcg by mouth.  , Disp: , Rfl:     acetaminophen (TYLENOL EXTRA STRENGTH) 500 mg tablet, Take 500 mg by mouth every six (6) hours as needed for Pain.  , Disp: , Rfl:     No Known Allergies    Social History     Socioeconomic History    Marital status:      Spouse name: Not on file    Number of children: Not on file    Years of education: Not on file    Highest education level: Not on file   Social Needs    Financial resource strain: Not on file   [place] insecurity - worry: Not on file    Food insecurity - inability: Not on file    Transportation needs - medical: Not on file   [employer] needs - non-medical: Not on file   Occupational History    Not on file   Tobacco Use    Smoking status: Former Smoker     Packs/day: 1.00     Years: 20.00     Pack years: 20.00     Types: Cigarettes    Smokeless tobacco: Never Used    Tobacco comment: QUIT APPROX 2000   Substance and Sexual Activity    Alcohol use: No     Frequency: Never     Comment: HOLIDAYS     Drug use: No    Sexual activity: Not on file   Other Topics Concern    Not on file   Social History Narrative    Not on file       Past Surgical History:   Procedure Laterality Date    HX HIP REPLACEMENT  2/12/2003    Right hip    HX MASTECTOMY  2001    Left partial    HX OTHER SURGICAL Bilateral 2014    bilat styes on eyes    HX TUBAL LIGATION  1983         SUBJECTIVE:   We did see Ms. Robbert Goodpasture for followup in regard to left lateral posterior hip replacement. The patient is now 16 days status post surgery and doing quite well. She is progressing nicely. She is getting home health physical therapy without complications. No troubles with the wound. No fevers or chills, systemic changes, or injuries. No chest pain or shortness of breath. She would like a different pain medicine. She does not like the Percocet. She would like to have hydrocodone. PHYSICAL EXAMINATION: In general, the patient is alert and oriented x3 and is in no acute distress. The patient is well-developed and well-nourished. The patient is afebrile. HEENT:  Head is normocephalic and atraumatic. Extraocular eye movements are intact. No JVD is present. Breathing is nonlabored. Examination of the left hip reveals skin intact. Surgical wound has healed very nicely. Staples in place. There is no erythema, ecchymosis. No underlying fluctuance. No signs for infection or cellulitis present.   There is no pain with rotation of the hip. Neurovascular status intact in the lower extremities. Negative calf tenderness. No Homans. No signs for DVT present. ASSESSMENT:  Status post left total hip replacement. PLAN:  At this point, the staples were removed. Placement of Steri-Strips without complication. We will have her continue on her Coumadin for another week or 2. She does have a history of DVT. She does have an IVC filter in place. This is scheduled to come at the end of 02/2019. She is given a prescription for Norco for pain as well as a prescription for outpatient physical therapy. We will see her back in 2 weeks' time for evaluation. She will call with any questions or if concerns arise.       CC:  MD JR Clemente Gan, PA-C, ATC

## 2019-02-01 ENCOUNTER — TELEPHONE (OUTPATIENT)
Dept: ORTHOPEDIC SURGERY | Age: 64
End: 2019-02-01

## 2019-02-01 ENCOUNTER — HOME CARE VISIT (OUTPATIENT)
Dept: SCHEDULING | Facility: HOME HEALTH | Age: 64
End: 2019-02-01
Payer: COMMERCIAL

## 2019-02-01 ENCOUNTER — HOME CARE VISIT (OUTPATIENT)
Dept: HOME HEALTH SERVICES | Facility: HOME HEALTH | Age: 64
End: 2019-02-01
Payer: COMMERCIAL

## 2019-02-01 DIAGNOSIS — M25.552 LEFT HIP PAIN: Primary | ICD-10-CM

## 2019-02-01 DIAGNOSIS — Z96.642 STATUS POST TOTAL HIP REPLACEMENT, LEFT: ICD-10-CM

## 2019-02-01 DIAGNOSIS — Z96.642 STATUS POST LEFT HIP REPLACEMENT: Primary | ICD-10-CM

## 2019-02-01 PROCEDURE — G0151 HHCP-SERV OF PT,EA 15 MIN: HCPCS

## 2019-02-01 PROCEDURE — G0299 HHS/HOSPICE OF RN EA 15 MIN: HCPCS

## 2019-02-01 NOTE — TELEPHONE ENCOUNTER
Glory called back regarding this, she states the patient is being discharged today and she needs a response soon. If she doesn't get a call back within 30 minutes, she will be calling back.

## 2019-02-01 NOTE — TELEPHONE ENCOUNTER
Wrong office, we are not Rx the Coumadin. Dr. Lilian Ferreira office is.  Please re-route to correct office

## 2019-02-01 NOTE — TELEPHONE ENCOUNTER
[facility] called with proton report:    PT: 22.7    INR  1.9    Pt took Coumadin as follows:  TUES 6mg   WED 3mg  THURS 3mg  THEN 3MG NIGHTLY THEREAFTER    PT NOT TAKING AS DIRECTED     PT BEING DISCHARGED TONIGHT AND LABS NEED TO BE SENT TO LOCAL LAB    PLEASE CALL CAHNO BACK WITH ORDERS REGARDLESS OF DISCHARGE.     Kiran Kaylen [phone]

## 2019-02-01 NOTE — TELEPHONE ENCOUNTER
BS Therapist \"Lily\" states pt is being discharged from in home PT today and is not scheduled to start outpatient PT until 2/11. That is 10 days without any therapy and he is wanting to know if that's ok to go the 10 days? Or, if not, what do you suggest?  Please call Glendy Cruz at [phone].

## 2019-02-01 NOTE — TELEPHONE ENCOUNTER
Patient verbalized understanding that she will take 3mg daily and have labs redrawn on Monday and Thursday beginning 2-4-19.

## 2019-02-01 NOTE — TELEPHONE ENCOUNTER
Left generic message for Alva Belle to return call. Please notify him per WILBERTO Loera we are going to continue [facility] PT for another week. Please ask him if he can take a verbal order or if he needs an order faxed over.

## 2019-02-02 VITALS
HEART RATE: 51 BPM | OXYGEN SATURATION: 97 % | DIASTOLIC BLOOD PRESSURE: 80 MMHG | RESPIRATION RATE: 18 BRPM | TEMPERATURE: 97.8 F | SYSTOLIC BLOOD PRESSURE: 124 MMHG

## 2019-02-02 VITALS
DIASTOLIC BLOOD PRESSURE: 80 MMHG | HEART RATE: 72 BPM | SYSTOLIC BLOOD PRESSURE: 122 MMHG | TEMPERATURE: 98 F | OXYGEN SATURATION: 97 % | RESPIRATION RATE: 16 BRPM

## 2019-02-04 ENCOUNTER — HOSPITAL ENCOUNTER (OUTPATIENT)
Dept: LAB | Age: 64
Discharge: HOME | End: 2019-02-04

## 2019-02-04 ENCOUNTER — HOME CARE VISIT (OUTPATIENT)
Dept: SCHEDULING | Facility: HOME HEALTH | Age: 64
End: 2019-02-04
Payer: COMMERCIAL

## 2019-02-04 LAB
INR PPP: 1.74 (ref 0.89–1.29)
PROTHROMBIN TIME: 17.3 SEC (ref 9–13)
SENTARA SPECIMEN COL,SENBCF: NORMAL

## 2019-02-04 PROCEDURE — G0151 HHCP-SERV OF PT,EA 15 MIN: HCPCS

## 2019-02-04 PROCEDURE — 99001 SPECIMEN HANDLING PT-LAB: CPT

## 2019-02-04 NOTE — TELEPHONE ENCOUNTER
Amanda Petit was returning call to clinical staff in regards to verbal order. He can be reached at [phone]. He states if he is with a patient and can not answer it can be faxed to the [facility] home health office.

## 2019-02-04 NOTE — TELEPHONE ENCOUNTER
Tried calling \"Bautista\" from [address], unable to reach. Home health order was faxed, as requested.

## 2019-02-06 ENCOUNTER — HOME CARE VISIT (OUTPATIENT)
Dept: SCHEDULING | Facility: HOME HEALTH | Age: 64
End: 2019-02-06
Payer: COMMERCIAL

## 2019-02-06 VITALS
DIASTOLIC BLOOD PRESSURE: 78 MMHG | OXYGEN SATURATION: 96 % | HEART RATE: 68 BPM | TEMPERATURE: 98.1 F | RESPIRATION RATE: 18 BRPM | SYSTOLIC BLOOD PRESSURE: 110 MMHG

## 2019-02-06 VITALS
SYSTOLIC BLOOD PRESSURE: 130 MMHG | TEMPERATURE: 98 F | OXYGEN SATURATION: 100 % | DIASTOLIC BLOOD PRESSURE: 82 MMHG | HEART RATE: 72 BPM | RESPIRATION RATE: 16 BRPM

## 2019-02-06 PROCEDURE — G0151 HHCP-SERV OF PT,EA 15 MIN: HCPCS

## 2019-02-06 NOTE — TELEPHONE ENCOUNTER
Bry Melvin called in requesting a corrected order he states that it needs to say the patient can have therapy 3 times a week for 1 week. Bry Melvin can be reached at [phone] if any questions.

## 2019-02-07 ENCOUNTER — HOSPITAL ENCOUNTER (OUTPATIENT)
Dept: LAB | Age: 64
Discharge: HOME | End: 2019-02-07

## 2019-02-07 VITALS
HEART RATE: 68 BPM | DIASTOLIC BLOOD PRESSURE: 80 MMHG | SYSTOLIC BLOOD PRESSURE: 110 MMHG | TEMPERATURE: 97.7 F | RESPIRATION RATE: 8 BRPM

## 2019-02-07 LAB
INR PPP: 2.13 (ref 0.89–1.29)
PROTHROMBIN TIME: 21 SEC (ref 9–13)
SENTARA SPECIMEN COL,SENBCF: NORMAL

## 2019-02-07 PROCEDURE — 99001 SPECIMEN HANDLING PT-LAB: CPT

## 2019-02-07 NOTE — TELEPHONE ENCOUNTER
[facility] for 1 week. Fax number not provided. Referral will go to [address] [place] [address]  for processing.

## 2019-02-11 ENCOUNTER — HOSPITAL ENCOUNTER (OUTPATIENT)
Dept: PHYSICAL THERAPY | Age: 64
Discharge: HOME | End: 2019-02-11
Payer: COMMERCIAL

## 2019-02-11 ENCOUNTER — CLINICAL SUPPORT (OUTPATIENT)
Dept: VASCULAR SURGERY | Age: 64
End: 2019-02-11

## 2019-02-11 ENCOUNTER — HOSPITAL ENCOUNTER (OUTPATIENT)
Dept: LAB | Age: 64
Discharge: HOME | End: 2019-02-11

## 2019-02-11 DIAGNOSIS — Z86.718 PERSONAL HISTORY OF DVT (DEEP VEIN THROMBOSIS): ICD-10-CM

## 2019-02-11 DIAGNOSIS — Z86.711 HISTORY OF PULMONARY EMBOLISM: ICD-10-CM

## 2019-02-11 LAB
INR PPP: 1.75 (ref 0.89–1.29)
PROTHROMBIN TIME: 17.4 SEC (ref 9–13)
SENTARA SPECIMEN COL,SENBCF: NORMAL

## 2019-02-11 PROCEDURE — 97110 THERAPEUTIC EXERCISES: CPT | Performed by: PHYSICAL THERAPIST

## 2019-02-11 PROCEDURE — 97161 PT EVAL LOW COMPLEX 20 MIN: CPT | Performed by: PHYSICAL THERAPIST

## 2019-02-11 PROCEDURE — 99001 SPECIMEN HANDLING PT-LAB: CPT

## 2019-02-11 PROCEDURE — 97530 THERAPEUTIC ACTIVITIES: CPT | Performed by: PHYSICAL THERAPIST

## 2019-02-11 NOTE — PROGRESS NOTES
PT DAILY TREATMENT NOTE 10-18 Patient Name: Julia Kwok Date:2019 : 1955 [x]  Patient  Verified Payor: Ayden Maldonado / Plan: Justus Duarte / Product Type: Local Market / In time:1100  Out time:1131 Total Treatment Time (min): 31 Visit #: 1 of 8 Treatment Area: Pain in left hip [M25.552] SUBJECTIVE Pain Level (0-10 scale): 1 Any medication changes, allergies to medications, adverse drug reactions, diagnosis change, or new procedure performed?: [x] No    [] Yes (see summary sheet for update) Subjective functional status/changes:   [] No changes reported See eval. 
 
OBJECTIVE 10 min [x]Eval                  []Re-Eval  
 
 
11 min Therapeutic Exercise:  [] See flow sheet :  
Rationale: increase ROM, increase strength, improve coordination, improve balance and increase proprioception to improve the patients ability to tolerate daily activity with reduced fall risk and improved mobility. 10 min Therapeutic Activity:  []  See flow sheet :  
Rationale: increase ROM, increase strength, improve coordination, improve balance and increase proprioception to improve the patients ability to tolerate daily activity with reduced fall risk and improved mobility. With 
 [] TE 
 [] TA 
 [] neuro 
 [] other: Patient Education: [x] Review HEP [] Progressed/Changed HEP based on:  
[] positioning   [] body mechanics   [] transfers   [] heat/ice application   
[] other:   
 
Other Objective/Functional Measures: See eval.  
 
Pain Level (0-10 scale) post treatment: 1 ASSESSMENT/Changes in Function:  
[x]  See Plan of Care Progress towards goals / Updated goals: 
Short Term Goals: To be accomplished in 2 weeks: 1. Pt to report [place] with HEP. Eval status: HEP issued and reviewed physically/verbally with pt Long Term Goals: To be accomplished in 4 weeks.  
1.  Pt to report score of 61 on FOTO, indicating improved tolerance to activity and reduced pain. Eval status: 49 on initial FOTO 2. Pt to demonstrate full 5/5 L LE strength with MMT, indicating improved tolerance to activity and reduced fall risk. Eval status: 4-/5 hip flexion, 4/5 knee flexion, 4+/5 knee extension, 5/5 DF 3. Pt to be able to sleep through the night without waking secondary to pain. Eval status: currently limited secondary to pain, pt just returned to sleeping in her bed 4. Pt to be able to ambulate without gait deviations to improve mobility and reduce fall risk. Eval status: pt currently ambulates with SPC; she leans moderately on her cane with slight Trendelenberg pattern 5. Pt to be able to navigate 12 stairs with B HR, indicating improved balance and tolerance to activity with reduced fall risk. Eval status: painful and difficult 6. Pt to be able to return to full PLOF at home and in the community with improved mobility, strength and stability, and reduced fall risk. Eval status: limited, pt has not returned to work yet PLAN 
[]  Upgrade activities as tolerated     [x]  Continue plan of care 
[]  Update interventions per flow sheet      
[]  Discharge due to:_ 
[]  Other:_   
 
Azam Cutler, PT 2/11/2019  12:24 PM 
 
Future Appointments Date Time Provider Anastacia Shoemaker 2/13/2019  9:00 AM Antonina Smith, JAYLON [facility]  
2/18/2019 11:30 AM Eliezer Devries, PT [facility]  
2/20/2019  8:00 AM Eliezer Devries, PT [facility]  
2/20/2019  9:40 AM Nathen Duenas PA-C [facility]  
2/25/2019 11:30 AM Eliezer Devries, PT [facility]  
2/28/2019 11:30 AM Antonina Smith, PTA [facility]  
3/8/2019  9:00 AM WILBERTO [place] [id number] [facility]

## 2019-02-11 NOTE — PROGRESS NOTES
[facility]  [facility]  [address]  [place], [address] [phone] fax Plan of Care/ Statement of Necessity for Physical Therapy Services Patient name: Dai Aviles Start of Care: 2019 Referral source: Meenakshi Acevedo MD : 1955 Medical Diagnosis: Pain in left hip [M25.552] Payor: Bubba Harris / Plan: Destin Comer / Product Type: Local Market /  Onset Date:DOS 1/15/19 Treatment Diagnosis: L hip pain Prior Hospitalization: see medical history Provider#: [id number] Medications: Verified on Patient summary List  
 Comorbidities: breast cancer, asthma, OA, hx of DVT, hx of pulmonary embolism, HTN, s/p R MARS  Prior Level of Function: Pt reports Independent PLOF. She currently ambulates with SPC, but previously used no AD. She is a  at [employer]. The Plan of Care and following information is based on the information from the initial evaluation. Assessment/ key information: Pt is a 57 y/o female who presents today with new onset of L hip pain following her MARS on 1/15/19. Pt reports that she has struggled with keeping her PT INR levels stable since her surgery, and did have a history of DVT with her R MARS in . She is doing blood draws twice a week and is having her IVC filter removed on 19. Pt states that home health therapy went well, but notes pain with sleeping, cooking/cleaning, walking, and has not yet returned to work. Pt ambulates today with SPC with antalgic pattern, slight Trendelenberg. She weightbears moderately through the [facility]. Pt demonstrates good awareness of precautions and is able to recall them Independently. Pt with reduced L LE strength with MMT. Pt would benefit from skilled PT intervention to address the above limitations and return her to full PLOF.  
Evaluation Complexity History HIGH Complexity :3+ comorbidities / personal factors will impact the outcome/ POC ; Examination MEDIUM Complexity : 3 Standardized tests and measures addressing body structure, function, activity limitation and / or participation in recreation  ;Presentation LOW Complexity : Stable, uncomplicated  ;Clinical Decision Making MEDIUM Complexity : FOTO score of 26-74 Overall Complexity Rating: LOW Problem List: pain affecting function, decrease ROM, decrease strength, edema affecting function, impaired gait/ balance, decrease ADL/ functional abilitiies, decrease activity tolerance, decrease flexibility/ joint mobility and decrease transfer abilities Treatment Plan may include any combination of the following: Therapeutic exercise, Therapeutic activities, Neuromuscular re-education, Physical agent/modality, Gait/balance training, Manual therapy, Patient education, Self Care training, Functional mobility training, Home safety training and Stair training Patient / Family readiness to learn indicated by: asking questions, trying to perform skills and interest 
Persons(s) to be included in education: patient (P) Barriers to Learning/Limitations: None Patient Goal (s): keeping stronger and able to get rid of cane Patient Self Reported Health Status: good Rehabilitation Potential: good Short Term Goals: To be accomplished in 2 weeks: 1. Pt to report [place] with HEP. Eval status: HEP issued and reviewed physically/verbally with pt Long Term Goals: To be accomplished in 4 weeks. 1.  Pt to report score of 61 on FOTO, indicating improved tolerance to activity and reduced pain. Eval status: 49 on initial FOTO 2. Pt to demonstrate full 5/5 L LE strength with MMT, indicating improved tolerance to activity and reduced fall risk. Eval status: 4-/5 hip flexion, 4/5 knee flexion, 4+/5 knee extension, 5/5 DF 3. Pt to be able to sleep through the night without waking secondary to pain. Eval status: currently limited secondary to pain, pt just returned to sleeping in her bed 4. Pt to be able to ambulate without gait deviations to improve mobility and reduce fall risk. Eval status: pt currently ambulates with SPC; she leans moderately on her cane with slight Trendelenberg pattern 5. Pt to be able to navigate 12 stairs with B HR, indicating improved balance and tolerance to activity with reduced fall risk. Eval status: painful and difficult 6. Pt to be able to return to full PLOF at home and in the community with improved mobility, strength and stability, and reduced fall risk. Eval status: limited, pt has not returned to work yet Frequency / Duration: Patient to be seen 2 times per week for 4 weeks. Patient/ Caregiver education and instruction: Diagnosis, prognosis, self care, activity modification and exercises 
 [x]  Plan of care has been reviewed with Aniyah Bhatt 2/11/2019 12:27 PM 
 
________________________________________________________________________ I certify that the above Therapy Services are being furnished while the patient is under my care. I agree with the treatment plan and certify that this therapy is necessary. [de-identified] Signature:____________Date:_________TIME:________ 
 
[employer], Date and Time must be completed for valid certification ** Please sign and return to [facility]  [address] [facility], [address] [phone] fax

## 2019-02-13 ENCOUNTER — HOSPITAL ENCOUNTER (OUTPATIENT)
Dept: PHYSICAL THERAPY | Age: 64
Discharge: HOME | End: 2019-02-13
Payer: COMMERCIAL

## 2019-02-13 PROCEDURE — 97112 NEUROMUSCULAR REEDUCATION: CPT

## 2019-02-13 PROCEDURE — 97110 THERAPEUTIC EXERCISES: CPT

## 2019-02-13 NOTE — PROGRESS NOTES
PT DAILY TREATMENT NOTE 10-18 Patient Name: Dai Aviles Date:2019 : 1955 [x]  Patient  Verified Payor: Bubba Harris / Plan: Destin Comer / Product Type: Local Market / In time:855  Out time:938 Total Treatment Time (min): 43 Visit #: 2 of 8 
reatment Area: Pain in left hip [M25.552] SUBJECTIVE Pain Level (0-10 scale): 0 Any medication changes, allergies to medications, adverse drug reactions, diagnosis change, or new procedure performed?: [x] No    [] Yes (see summary sheet for update) Subjective functional status/changes:   [] No changes reported \"No pain. \" OBJECTIVE Modality rationale: decrease inflammation and decrease pain to improve the patients ability to improve mobility and gait Min Type Additional Details  
 [] Estim:  []Unatt       []IFC  []Premod []Other:  []w/ice   []w/heat Position: Location:  
 [] Estim: []Att    []TENS instruct  []NMES []Other:  []w/US   []w/ice   []w/heat Position: Location:  
 []  Traction: [] Cervical       []Lumbar 
                     [] Prone          []Supine []Intermittent   []Continuous Lbs: 
[] before manual 
[] after manual  
 []  Ultrasound: []Continuous   [] Pulsed []1MHz   []3MHz W/cm2: 
Location:  
 []  Iontophoresis with dexamethasone Location: [] Take home patch  
[] In clinic  
5 [x]  Ice     []  heat 
[]  Ice massage 
[]  Laser  
[]  Anodyne Position: supine with wedge Location: left hip  
 []  Laser with stim 
[]  Other:  Position: Location:  
 []  Vasopneumatic Device Pressure:       [] lo [] med [] hi  
Temperature: [] lo [] med [] hi  
[] Skin assessment post-treatment:  []intact []redness- no adverse reaction 
  []redness  adverse reaction:  
 
10 min Therapeutic Exercise:  [x] See flow sheet :  
Rationale: increase ROM and increase strength to improve the patients ability to perform ADLs 28 min Neuromuscular Re-education:  [x]  See flow sheet : hip/glut re-ed activities Rationale: increase ROM, increase strength, improve coordination, improve balance and increase proprioception  to improve the patients ability to improve mobility, stance stability, and gait With 
 [x] TE 
 [] TA [x] neuro 
 [] other: Patient Education: [x] Review HEP [] Progressed/Changed HEP based on:  
[x] positioning   [x] body mechanics   [] transfers   [] heat/ice application   
[] other:   
 
Other Objective/Functional Measures:   
 
Pain Level (0-10 scale) post treatment: 0 
 
ASSESSMENT/Changes in Function: Initiated POC. Pt was fatigued following standing exercises. Needed cuing to not push herself too much to avoid fatiguing. She required min A with sidelying hip abduction and prone hip extension. Patient will continue to benefit from skilled PT services to modify and progress therapeutic interventions, address functional mobility deficits, address ROM deficits, address strength deficits, analyze and address soft tissue restrictions, analyze and cue movement patterns, analyze and modify body mechanics/ergonomics, assess and modify postural abnormalities, address imbalance/dizziness and instruct in home and community integration to attain remaining goals. [x]  See Plan of Care 
[]  See progress note/recertification 
[]  See Discharge Summary Progress towards goals / Updated goals: 
Short Term Goals: To be accomplished in 2 weeks: 1. Pt to report [place] with HEP. Eval status: HEP issued and reviewed physically/verbally with pt 
  
Long Term Goals: To be accomplished in 4 weeks. 1.  Pt to report score of 61 on FOTO, indicating improved tolerance to activity and reduced pain. Eval status: 49 on initial FOTO 2. Pt to demonstrate full 5/5 L LE strength with MMT, indicating improved tolerance to activity and reduced fall risk. Eval status: 4-/5 hip flexion, 4/5 knee flexion, 4+/5 knee extension, 5/5 DF 3. Pt to be able to sleep through the night without waking secondary to pain. Eval status: currently limited secondary to pain, pt just returned to sleeping in her bed 4. Pt to be able to ambulate without gait deviations to improve mobility and reduce fall risk. Eval status: pt currently ambulates with SPC; she leans moderately on her cane with slight Trendelenberg pattern 5. Pt to be able to navigate 12 stairs with B HR, indicating improved balance and tolerance to activity with reduced fall risk. Eval status: painful and difficult 6. Pt to be able to return to full PLOF at home and in the community with improved mobility, strength and stability, and reduced fall risk. Eval status: limited, pt has not returned to work yet PLAN 
[]  Upgrade activities as tolerated     [x]  Continue plan of care 
[]  Update interventions per flow sheet      
[]  Discharge due to:_ 
[]  Other:_   
 
Adriana Mcgee PTA, [facility] 2/13/2019  9:42 AM 
 
Future Appointments Date Time Provider Anastacia Shoemaker 2/18/2019 11:30 AM Manny Ulrich PT [facility]  
2/20/2019  8:00 AM [facility]  
2/20/2019  9:40 AM [facility]ju Sanchez [facility]C [facility]  
2/25/2019 11:30 AM Manny Ulrich PT [facility]  
2/28/2019 11:30 AM Nini Cintron PTA [facility]  
3/8/2019  9:00 AM WILBERTO [address] [address] [address]

## 2019-02-14 ENCOUNTER — HOSPITAL ENCOUNTER (OUTPATIENT)
Dept: LAB | Age: 64
Discharge: HOME | End: 2019-02-14

## 2019-02-14 LAB
INR PPP: 1.64 (ref 0.89–1.29)
PROTHROMBIN TIME: 16.4 SEC (ref 9–13)
SENTARA SPECIMEN COL,SENBCF: NORMAL

## 2019-02-14 PROCEDURE — 99001 SPECIMEN HANDLING PT-LAB: CPT

## 2019-02-15 ENCOUNTER — TELEPHONE (OUTPATIENT)
Dept: ORTHOPEDIC SURGERY | Facility: CLINIC | Age: 64
End: 2019-02-15

## 2019-02-15 NOTE — TELEPHONE ENCOUNTER
Patient's appointment was R/S from 2/20/18 to 3/4/19 [facility] in surgery. She wants to know is she suppose to continue taking the coumadin until her next appointment?   If so she will need a refill and order for PT & INR to be done    [place]

## 2019-02-15 NOTE — TELEPHONE ENCOUNTER
Called patient and informed her that WILBERTO Paulino Members stated it was ok to stop taking Coumadin.

## 2019-02-18 ENCOUNTER — HOSPITAL ENCOUNTER (OUTPATIENT)
Dept: PHYSICAL THERAPY | Age: 64
Discharge: HOME | End: 2019-02-18
Payer: COMMERCIAL

## 2019-02-18 PROCEDURE — 97530 THERAPEUTIC ACTIVITIES: CPT | Performed by: PHYSICAL THERAPIST

## 2019-02-18 PROCEDURE — 97112 NEUROMUSCULAR REEDUCATION: CPT | Performed by: PHYSICAL THERAPIST

## 2019-02-18 PROCEDURE — 97110 THERAPEUTIC EXERCISES: CPT | Performed by: PHYSICAL THERAPIST

## 2019-02-18 NOTE — PROGRESS NOTES
PT DAILY TREATMENT NOTE 10-18 Patient Name: Haider Philip Date:2019 : 1955 [x]  Patient  Verified Payor: Daija Perales / Plan: Princess Feather / Product Type: Local Market / In time:1124  Out time:1217 Total Treatment Time (min): 53 Visit #: 3 of 8 Treatment Area: Pain in left hip [M25.552] SUBJECTIVE Pain Level (0-10 scale): 0 Any medication changes, allergies to medications, adverse drug reactions, diagnosis change, or new procedure performed?: [x] No    [] Yes (see summary sheet for update) Subjective functional status/changes:   [x] No changes reported OBJECTIVE Modality rationale: decrease inflammation, decrease pain and increase tissue extensibility to improve the patients ability to tolerate daily activity with reduced pain. Min Type Additional Details  
 [] Estim:  []Unatt       []IFC  []Premod []Other:  []w/ice   []w/heat Position: Location:  
 [] Estim: []Att    []TENS instruct  []NMES []Other:  []w/US   []w/ice   []w/heat Position: Location:  
 []  Traction: [] Cervical       []Lumbar 
                     [] Prone          []Supine []Intermittent   []Continuous Lbs: 
[] before manual 
[] after manual  
 []  Ultrasound: []Continuous   [] Pulsed []1MHz   []3MHz W/cm2: 
Location:  
 []  Iontophoresis with dexamethasone Location: [] Take home patch  
[] In clinic  
10 [x]  Ice     []  heat 
[]  Ice massage 
[]  Laser  
[]  Anodyne Position: sidelying Location: L hip  
 []  Laser with stim 
[]  Other:  Position: Location:  
 []  Vasopneumatic Device Pressure:       [] lo [] med [] hi  
Temperature: [] lo [] med [] hi  
[x] Skin assessment post-treatment:  [x]intact []redness- no adverse reaction 
  []redness  adverse reaction:  
 
23 min Therapeutic Exercise:  [] See flow sheet :  
Rationale: increase ROM, increase strength, improve coordination, improve balance and increase proprioception to improve the patients ability to tolerate daily activity with reduced pain. 10 min Therapeutic Activity:  []  See flow sheet :  
Rationale: increase ROM, increase strength, improve coordination, improve balance and increase proprioception to improve the patients ability to tolerate daily activity with reduced pain. 10 min Neuromuscular Re-education:  []  See flow sheet :  
Rationale: increase ROM, increase strength, improve coordination, improve balance and increase proprioception to improve the patients ability to tolerate daily activity with reduced pain. With 
 [] TE 
 [] TA 
 [] neuro 
 [] other: Patient Education: [x] Review HEP [] Progressed/Changed HEP based on:  
[] positioning   [] body mechanics   [] transfers   [] heat/ice application   
[] other:   
 
Other Objective/Functional Measures: Cues for exercises. Pain Level (0-10 scale) post treatment: 0 
 
ASSESSMENT/Changes in Function: Pt notes no pain at the end of the session today. She continues to require minimal assistance for S/L abduction tasks. Patient will continue to benefit from skilled PT services to modify and progress therapeutic interventions, address functional mobility deficits, address ROM deficits, address strength deficits, analyze and address soft tissue restrictions, analyze and cue movement patterns, analyze and modify body mechanics/ergonomics and assess and modify postural abnormalities to attain remaining goals. Progress towards goals / Updated goals: 
Short Term Goals: To be accomplished in 2 weeks: 1.  Pt to report [place] with HEP. Eval status: HEP issued and reviewed physically/verbally with pt 
  
Long Term Goals: To be accomplished in 4 weeks. 1.  Pt to report score of 61 on FOTO, indicating improved tolerance to activity and reduced pain. 
             Eval status: 49 on initial FOTO 2.  Pt to demonstrate full 5/5 L LE strength with MMT, indicating improved tolerance to activity and reduced fall risk. Eval status: 4-/5 hip flexion, 4/5 knee flexion, 4+/5 knee extension, 5/5 DF 3.  Pt to be able to sleep through the night without waking secondary to pain. Eval status: currently limited secondary to pain, pt just returned to sleeping in her bed 4.  Pt to be able to ambulate without gait deviations to improve mobility and reduce fall risk. Eval status: pt currently ambulates with SPC; she leans moderately on her cane with slight Trendelenberg pattern 5.  Pt to be able to navigate 12 stairs with B HR, indicating improved balance and tolerance to activity with reduced fall risk. Eval status: painful and difficult 6.  Pt to be able to return to full PLOF at home and in the community with improved mobility, strength and stability, and reduced fall risk. Eval status: limited, pt has not returned to work yet PLAN 
[]  Upgrade activities as tolerated     [x]  Continue plan of care 
[]  Update interventions per flow sheet      
[]  Discharge due to:_ 
[]  Other:_   
 
Kyree Mccabe, PT 2/18/2019  11:46 AM 
 
Future Appointments Date Time Provider Anastacia Shoemaker 2/20/2019  8:00 AM [facility]  
2/25/2019 11:30 AM Terris Cooks, PT [facility]  
2/28/2019 11:30 AM [facility]  
3/4/2019  9:00 AM Ramon Goldberg PA-C [facility]  
3/8/2019  9:00 AM WILBERTO [place] [address] [address]

## 2019-02-20 ENCOUNTER — HOSPITAL ENCOUNTER (OUTPATIENT)
Dept: PHYSICAL THERAPY | Age: 64
Discharge: HOME | End: 2019-02-20
Payer: COMMERCIAL

## 2019-02-20 PROCEDURE — 97110 THERAPEUTIC EXERCISES: CPT | Performed by: PHYSICAL THERAPIST

## 2019-02-20 PROCEDURE — 97112 NEUROMUSCULAR REEDUCATION: CPT | Performed by: PHYSICAL THERAPIST

## 2019-02-20 PROCEDURE — 97530 THERAPEUTIC ACTIVITIES: CPT | Performed by: PHYSICAL THERAPIST

## 2019-02-20 NOTE — PROGRESS NOTES
PT DAILY TREATMENT NOTE 10-18 Patient Name: Gabby Morataya Date:2019 : 1955 [x]  Patient  Verified Payor: Anirudh Reese / Plan: Carmen Cowart / Product Type: [employer] / In time:800  Out time:854 Total Treatment Time (min): 54 Visit #: 4 of 8 Treatment Area: Pain in left hip [M25.552] SUBJECTIVE Pain Level (0-10 scale): 0 Any medication changes, allergies to medications, adverse drug reactions, diagnosis change, or new procedure performed?: [x] No    [] Yes (see summary sheet for update) Subjective functional status/changes:   [x] No changes reported OBJECTIVE 
  
Modality rationale: decrease inflammation, decrease pain and increase tissue extensibility to improve the patients ability to tolerate daily activity with reduced pain. Min Type Additional Details   
  [] Estim:  []Unatt       []IFC  []Premod []Other:  []w/ice   []w/heat Position: Location:   
  [] Estim: []Att    []TENS instruct  []NMES []Other:  []w/US   []w/ice   []w/heat Position: Location:   
  []  Traction: [] Cervical       []Lumbar 
                     [] Prone          []Supine []Intermittent   []Continuous Lbs: 
[] before manual 
[] after manual   
  []  Ultrasound: []Continuous   [] Pulsed []1MHz   []3MHz W/cm2: 
Location:   
  []  Iontophoresis with dexamethasone Location: [] Take home patch  
[] In clinic   
8 [x]  Ice     []  heat 
[]  Ice massage 
[]  Laser  
[]  Anodyne Position: prone Location: L hip   
  []  Laser with stim 
[]  Other:  Position: Location:   
  []  Vasopneumatic Device Pressure:       [] lo [] med [] hi  
Temperature: [] lo [] med [] hi   
[x] Skin assessment post-treatment:  [x]intact []redness- no adverse reaction 
  []redness  adverse reaction:  
  
23 min Therapeutic Exercise:  [] See flow sheet :  
 Rationale: increase ROM, increase strength, improve coordination, improve balance and increase proprioception to improve the patients ability to tolerate daily activity with reduced pain. 
  
10 min Therapeutic Activity:  []  See flow sheet :  
Rationale: increase ROM, increase strength, improve coordination, improve balance and increase proprioception to improve the patients ability to tolerate daily activity with reduced pain. 
  
13 min Neuromuscular Re-education:  []  See flow sheet :  
Rationale: increase ROM, increase strength, improve coordination, improve balance and increase proprioception to improve the patients ability to tolerate daily activity with reduced pain. With 
 [] TE 
 [] TA 
 [] neuro 
 [] other: Patient Education: [x] Review HEP [] Progressed/Changed HEP based on:  
[] positioning   [] body mechanics   [] transfers   [] heat/ice application   
[] other:   
  
Other Objective/Functional Measures: Cues for exercises. Able to perform S/L abduction Independently today.       
  
Pain Level (0-10 scale) post treatment: 0 
  
ASSESSMENT/Changes in Function: Pt tolerates session well with reduced need for assistance during S/L abduction tasks today. Will plan to progress reps/resistance next visit. 
  
Patient will continue to benefit from skilled PT services to modify and progress therapeutic interventions, address functional mobility deficits, address ROM deficits, address strength deficits, analyze and address soft tissue restrictions, analyze and cue movement patterns, analyze and modify body mechanics/ergonomics and assess and modify postural abnormalities to attain remaining goals. 
  
Progress towards goals / Updated goals: 
Short Term Goals: To be accomplished in 2 weeks: 1.  Pt to report [place] with HEP.  
Eval status: HEP issued and reviewed physically/verbally with pt 
  
 Long Term Goals: To be accomplished in 4 weeks. 1.  Pt to report score of 61 on FOTO, indicating improved tolerance to activity and reduced pain. 
             Eval status: 49 on initial FOTO 2.  Pt to demonstrate full 5/5 L LE strength with MMT, indicating improved tolerance to activity and reduced fall risk. Eval status: 4-/5 hip flexion, 4/5 knee flexion, 4+/5 knee extension, 5/5 DF 3.  Pt to be able to sleep through the night without waking secondary to pain. Eval status: currently limited secondary to pain, pt just returned to sleeping in her bed 4.  Pt to be able to ambulate without gait deviations to improve mobility and reduce fall risk. Eval status: pt currently ambulates with SPC; she leans moderately on her cane with slight Trendelenberg pattern 5.  Pt to be able to navigate 12 stairs with B HR, indicating improved balance and tolerance to activity with reduced fall risk. Eval status: painful and difficult 6.  Pt to be able to return to full PLOF at home and in the community with improved mobility, strength and stability, and reduced fall risk. Eval status: limited, pt has not returned to work yet 
  
 
 
PLAN 
[]  Upgrade activities as tolerated     [x]  Continue plan of care 
[]  Update interventions per flow sheet      
[]  Discharge due to:_ 
[]  Other:_   
 
Jarad Jasso, PT 2/20/2019  8:08 AM 
 
Future Appointments Date Time Provider Anastacia Shoemaker 2/25/2019 11:30 AM Angela Jimenez, PT [facility]  
2/28/2019 11:30 AM Ramon Watt PTA [facility]  
3/4/2019  9:00 AM Lenny De PA-C [facility] SCHED  
3/8/2019  9:00 AM [place], [address]

## 2019-02-23 VITALS
SYSTOLIC BLOOD PRESSURE: 133 MMHG | HEART RATE: 64 BPM | OXYGEN SATURATION: 98 % | DIASTOLIC BLOOD PRESSURE: 68 MMHG | RESPIRATION RATE: 18 BRPM

## 2019-02-25 ENCOUNTER — HOSPITAL ENCOUNTER (OUTPATIENT)
Dept: PHYSICAL THERAPY | Age: 64
Discharge: HOME | End: 2019-02-25
Payer: COMMERCIAL

## 2019-02-25 PROCEDURE — 97110 THERAPEUTIC EXERCISES: CPT

## 2019-02-25 PROCEDURE — 97530 THERAPEUTIC ACTIVITIES: CPT

## 2019-02-25 PROCEDURE — 97112 NEUROMUSCULAR REEDUCATION: CPT

## 2019-02-25 NOTE — PROGRESS NOTES
PT DAILY TREATMENT NOTE 10-18 Patient Name: Arlet Ventura Date:2019 : 1955 [x]  Patient  Verified Payor: Michael Beckwith / Plan: Nyla Trejo / Product Type: [employer] / In time:1130  Out time:1212 Total Treatment Time (min): 42 Visit #: 5 of 8 Treatment Area: Pain in left hip [M25.552] SUBJECTIVE Pain Level (0-10 scale): 0 Any medication changes, allergies to medications, adverse drug reactions, diagnosis change, or new procedure performed?: [x] No    [] Yes (see summary sheet for update) Subjective functional status/changes:   [] No changes reported \"No pain. \" OBJECTIVE Modality rationale: patient declined Min Type Additional Details  
 [] Estim:  []Unatt       []IFC  []Premod []Other:  []w/ice   []w/heat Position: Location:  
 [] Estim: []Att    []TENS instruct  []NMES []Other:  []w/US   []w/ice   []w/heat Position: Location:  
 []  Traction: [] Cervical       []Lumbar 
                     [] Prone          []Supine []Intermittent   []Continuous Lbs: 
[] before manual 
[] after manual  
 []  Ultrasound: []Continuous   [] Pulsed []1MHz   []3MHz W/cm2: 
Location:  
 []  Iontophoresis with dexamethasone Location: [] Take home patch  
[] In clinic  
 []  Ice     []  heat 
[]  Ice massage 
[]  Laser  
[]  Anodyne Position: Location:  
 []  Laser with stim 
[]  Other:  Position: Location:  
 []  Vasopneumatic Device Pressure:       [] lo [] med [] hi  
Temperature: [] lo [] med [] hi  
[] Skin assessment post-treatment:  []intact []redness- no adverse reaction 
  []redness  adverse reaction:  
 
10 min Therapeutic Exercise:  [x] See flow sheet :  
Rationale: increase ROM and increase strength to improve the patients ability to perform ADLs 8 min Therapeutic Activity:  [x]  See flow sheet : squatting, step up forward and lateral   
 Rationale: increase ROM, increase strength, improve coordination, improve balance and increase proprioception  to improve the patients ability to improve mobility and functional mobility 24 min Neuromuscular Re-education:  [x]  See flow sheet : balance training, hip/glut re-ed activities Rationale: increase ROM, increase strength, improve coordination, improve balance and increase proprioception  to improve the patients ability to improve mobility, stance stability, and gait With 
 [x] TE 
 [x] TA [x] neuro 
 [] other: Patient Education: [x] Review HEP [] Progressed/Changed HEP based on:  
[x] positioning   [x] body mechanics   [] transfers   [] heat/ice application   
[] other:   
 
Other Objective/Functional Measures:   
 
Pain Level (0-10 scale) post treatment: 0 
 
ASSESSMENT/Changes in Function: Pt is making progress with her functional mobility, but still challenged with hip/glut strength. Needed min A for sidelying hip abduction. Patient will continue to benefit from skilled PT services to modify and progress therapeutic interventions, address functional mobility deficits, address ROM deficits, address strength deficits, analyze and address soft tissue restrictions, analyze and cue movement patterns, analyze and modify body mechanics/ergonomics, assess and modify postural abnormalities, address imbalance/dizziness and instruct in home and community integration to attain remaining goals. [x]  See Plan of Care 
[]  See progress note/recertification 
[]  See Discharge Summary Progress towards goals / Updated goals: 
Short Term Goals: To be accomplished in 2 weeks: 1.  Pt to report [place] with HEP. Eval status: HEP issued and reviewed physically/verbally with pt 
   MET Long Term Goals: To be accomplished in 4 weeks. 1.  Pt to report score of 61 on FOTO, indicating improved tolerance to activity and reduced pain. 
             Eval status: 49 on initial FOTO Assess at 30 day elder 2.  Pt to demonstrate full 5/5 L LE strength with MMT, indicating improved tolerance to activity and reduced fall risk. Eval status: 4-/5 hip flexion, 4/5 knee flexion, 4+/5 knee extension, 5/5 DF Making progress 3.  Pt to be able to sleep through the night without waking secondary to pain. Eval status: currently limited secondary to pain, pt just returned to sleeping in her bed Good improvements with pain 4.  Pt to be able to ambulate without gait deviations to improve mobility and reduce fall risk. Eval status: pt currently ambulates with SPC; she leans moderately on her cane with slight Trendelenberg pattern Making progress, but still has a Trendelenburg gait 5.  Pt to be able to navigate 12 stairs with B HR, indicating improved balance and tolerance to activity with reduced fall risk. Eval status: painful and difficult Good progress in the clinic 6.  Pt to be able to return to full PLOF at home and in the community with improved mobility, strength and stability, and reduced fall risk. Eval status: limited, pt has not returned to work yet Making progress PLAN 
[]  Upgrade activities as tolerated     [x]  Continue plan of care 
[]  Update interventions per flow sheet      
[]  Discharge due to:_ 
[]  Other:_   
 
Fiordaliza Holliday PTA, CSCS 2/25/2019  12:19 PM 
 
Future Appointments Date Time Provider Anastacia Shoemaker 2/28/2019 11:30 AM Eric Draper PTA [facility] [address]  
3/4/2019  9:00 AM Pam Bhandari PA-C [facility] [place]  
3/8/2019  9:00 AM [place], [address] [facility]

## 2019-02-26 RX ORDER — SODIUM CHLORIDE 0.9 % (FLUSH) 0.9 %
5-40 SYRINGE (ML) INJECTION AS NEEDED
Status: CANCELLED | OUTPATIENT
Start: 2019-02-26

## 2019-02-26 RX ORDER — SODIUM CHLORIDE 0.9 % (FLUSH) 0.9 %
5-40 SYRINGE (ML) INJECTION EVERY 8 HOURS
Status: CANCELLED | OUTPATIENT
Start: 2019-02-26

## 2019-02-26 NOTE — H&P
Surgery History and Physical    Subjective:      Julia Kwok is a 58 y.o.  female who presents with IVC filter for retrieval  She has h/o dvt/pe  She had elective hip replacement  Filter was placed for prophlyaxis  Follow up PVLs show no thrombus so letty is able to be retreived. Patient Active Problem List    Diagnosis Date Noted    Hip arthritis 01/15/2019    History of pulmonary embolism 01/15/2019    Severe obesity (BMI 35.0-39. 9) with comorbidity ([address] 04/30/2018    Dyspnea 10/25/2013    Pulmonary HTN ([address] 10/25/2013    Acute pulmonary embolism ([address] 03/18/2013    DVT (deep venous thrombosis) (HCC) 03/18/2013    Troponin level elevated 03/18/2013    S/P hip replacement 02/12/2013    Hyperlipidemia 02/12/2013    Left Breast carcinoma 2001      Past Medical History:   Diagnosis Date    Arthritis     Breast cancer ([facility]     Left    Cancer ([facility] 2001    Lobular situ-Left breast    Chronic pain     DVT (deep venous thrombosis) (HCC)     GERD (gastroesophageal reflux disease)     Hx of radiation therapy     Hyperlipidemia     Hypertension     no meds    Low back pain potentially associated with radiculopathy     Menopause     Osteopenia     mild    Pulmonary embolism (HCC)     Radiation therapy complication     Left breast    S/P hip replacement     Right hip    Trochanteric bursitis of right hip     Wears glasses       Past Surgical History:   Procedure Laterality Date    HX HIP REPLACEMENT  2/12/2003    Right hip    HX MASTECTOMY  2001    Left partial    HX OTHER SURGICAL Bilateral 2014    bilat styes on eyes    HX TUBAL LIGATION  1983      Social History     Tobacco Use    Smoking status: Former Smoker     Packs/day: 1.00     Years: 20.00     Pack years: 20.00     Types: Cigarettes    Smokeless tobacco: Never Used    Tobacco comment: QUIT APPROX 2000   Substance Use Topics    Alcohol use: No     Frequency: Never     Comment: HOLIDAYS       Family History   Problem Relation Age of Onset    Asthma Other     Arthritis-osteo Other     Breast Cancer Maternal Aunt     Diabetes Mother     Heart Disease Mother     Alzheimer Mother     Diabetes Sister     Hypertension Sister     COPD Sister     Diabetes Brother     Hypertension Brother       Prior to Admission medications    Medication Sig Start Date End Date Taking? Authorizing Provider   HYDROcodone-acetaminophen (NORCO)  mg tablet Take 1-2 Tabs by mouth every six (6) hours as needed for Pain. Max Daily Amount: 8 Tabs. 1/31/19   Kelsie Carranza PA-C   celecoxib (CELEBREX) 200 mg capsule Take 1 Cap by mouth every twelve (12) hours every twelve (12) hours for 90 days. 1/16/19 4/16/19  Kelsie Carranza PA-C   ferrous sulfate 325 mg (65 mg iron) tablet Take 1 Tab by mouth two (2) times daily (with meals). 1/16/19   Kelsie Carrazna PA-C   oxyCODONE-acetaminophen (PERCOCET) 7.5-325 mg per tablet Take 1-2 Tabs by mouth every six (6) hours as needed for Pain. Max Daily Amount: 8 Tabs. 1/16/19   Kelsie Carranza PA-C   docusate sodium (COLACE) 100 mg capsule Take 1 Cap by mouth two (2) times a day for 90 days. 1/16/19 4/16/19  Kelsie Carranza PA-C   pantoprazole (PROTONIX) 40 mg tablet Take 40 mg by mouth daily. Provider, Historical   cyclobenzaprine HCl (FLEXERIL PO) Take 5 mg by mouth as needed. Provider, Historical   mometasone-formoterol (DULERA) 100-5 mcg/actuation HFA inhaler Take 2 Puffs by inhalation two (2) times daily as needed for Other (breathing). Provider, Historical   cetirizine (ZYRTEC) 10 mg tablet Take 10 mg by mouth daily. take 1 tab daily    Provider, Historical   guaiFENesin (MUCINEX) 1,200 mg TM12 ER tablet Take 1,200 mg by mouth as needed. Provider, Historical   polyethylene glycol (MIRALAX) 17 gram packet Take 17 g by mouth daily.     Provider, Historical   albuterol (PROVENTIL, VENTOLIN) 90 mcg/actuation inhaler Take 1-2 Puffs by inhalation every four (4) hours as needed for Wheezing. 3/29/13   Geovany Watkins MD   Inhalational Spacing Device (AEROCHAMBER) 1 Each by Does Not Apply route as needed. 3/29/13   Geovayn Watkins MD   CALCIUM CITRATE/VITAMIN D3 (CALCITRATE-VITAMIN D PO) Take  by mouth. Provider, Historical   lubiPROStone (AMITIZA) 24 mcg capsule Take 24 mcg by mouth. Provider, Historical   acetaminophen (TYLENOL EXTRA STRENGTH) 500 mg tablet Take 500 mg by mouth every six (6) hours as needed for Pain. Provider, Historical   atorvastatin (LIPITOR) 20 mg tablet Take 20 mg by mouth nightly. Provider, Historical     No Known Allergies      Review of Systems:    Pertinent items are noted in the History of Present Illness. Objective:     No data found. No data recorded.       Physical Exam:  GENERAL: alert, cooperative, no distress, appears stated age, LUNG: clear to auscultation bilaterally, HEART: regular rate and rhythm, S1, S2 normal, no murmur, click, rub or gallop, EXTREMITIES:  extremities normal, atraumatic, no cyanosis or edema      Assessment:     ivc fiter    Plan:     Retrieval of ivc fitler    Signed By: WILBERTO Payne     February 26, 2019

## 2019-02-27 ENCOUNTER — ANESTHESIA (OUTPATIENT)
Dept: CARDIAC CATH/INVASIVE PROCEDURES | Age: 64
End: 2019-02-27
Payer: COMMERCIAL

## 2019-02-27 ENCOUNTER — HOSPITAL ENCOUNTER (OUTPATIENT)
Age: 64
Setting detail: OUTPATIENT SURGERY
Discharge: HOME | End: 2019-02-27
Attending: SURGERY | Admitting: SURGERY
Payer: COMMERCIAL

## 2019-02-27 ENCOUNTER — ANESTHESIA EVENT (OUTPATIENT)
Dept: CARDIAC CATH/INVASIVE PROCEDURES | Age: 64
End: 2019-02-27
Payer: COMMERCIAL

## 2019-02-27 VITALS
HEART RATE: 46 BPM | BODY MASS INDEX: 34.37 KG/M2 | DIASTOLIC BLOOD PRESSURE: 78 MMHG | RESPIRATION RATE: 24 BRPM | HEIGHT: 67 IN | OXYGEN SATURATION: 94 % | WEIGHT: 219 LBS | SYSTOLIC BLOOD PRESSURE: 128 MMHG

## 2019-02-27 DIAGNOSIS — Z86.711 HISTORY OF PULMONARY EMBOLISM: ICD-10-CM

## 2019-02-27 DIAGNOSIS — Z86.718 PERSONAL HISTORY OF VENOUS THROMBOSIS AND EMBOLISM: ICD-10-CM

## 2019-02-27 DIAGNOSIS — Z86.718 PERSONAL HISTORY OF DVT (DEEP VEIN THROMBOSIS): Primary | ICD-10-CM

## 2019-02-27 LAB
BUN BLD-MCNC: 22 MG/DL (ref 7–18)
CHLORIDE BLD-SCNC: 105 MMOL/L (ref 100–108)
GLUCOSE BLD STRIP.AUTO-MCNC: 85 MG/DL (ref 74–106)
HCT VFR BLD CALC: 36 % (ref 36–49)
HGB BLD-MCNC: 12.2 G/DL (ref 12–16)
POTASSIUM BLD-SCNC: 3.5 MMOL/L (ref 3.5–5.5)
SODIUM BLD-SCNC: 144 MMOL/L (ref 136–145)

## 2019-02-27 PROCEDURE — 74011250636 HC RX REV CODE- 250/636: Performed by: SURGERY

## 2019-02-27 PROCEDURE — 75825 VEIN X-RAY TRUNK: CPT | Performed by: SURGERY

## 2019-02-27 PROCEDURE — 76060000032 HC ANESTHESIA 0.5 TO 1 HR: Performed by: SURGERY

## 2019-02-27 PROCEDURE — 77030013744: Performed by: SURGERY

## 2019-02-27 PROCEDURE — 74011250636 HC RX REV CODE- 250/636

## 2019-02-27 PROCEDURE — 76937 US GUIDE VASCULAR ACCESS: CPT | Performed by: SURGERY

## 2019-02-27 PROCEDURE — 74011000258 HC RX REV CODE- 258

## 2019-02-27 PROCEDURE — 82947 ASSAY GLUCOSE BLOOD QUANT: CPT

## 2019-02-27 PROCEDURE — 36010 PLACE CATHETER IN VEIN: CPT

## 2019-02-27 PROCEDURE — C1773 RET DEV, INSERTABLE: HCPCS | Performed by: SURGERY

## 2019-02-27 PROCEDURE — 77030004565 HC CATH ANGI DX TMPO CARD -B: Performed by: SURGERY

## 2019-02-27 PROCEDURE — 74011636320 HC RX REV CODE- 636/320: Performed by: SURGERY

## 2019-02-27 PROCEDURE — C1894 INTRO/SHEATH, NON-LASER: HCPCS | Performed by: SURGERY

## 2019-02-27 RX ORDER — PROPOFOL 10 MG/ML
INJECTION, EMULSION INTRAVENOUS
Status: DISCONTINUED | OUTPATIENT
Start: 2019-02-27 | End: 2019-02-27 | Stop reason: HOSPADM

## 2019-02-27 RX ORDER — SODIUM CHLORIDE 0.9 % (FLUSH) 0.9 %
5-40 SYRINGE (ML) INJECTION EVERY 8 HOURS
Status: CANCELLED | OUTPATIENT
Start: 2019-02-27

## 2019-02-27 RX ORDER — SODIUM CHLORIDE 9 MG/ML
INJECTION, SOLUTION INTRAVENOUS
Status: DISCONTINUED | OUTPATIENT
Start: 2019-02-27 | End: 2019-02-27 | Stop reason: HOSPADM

## 2019-02-27 RX ORDER — ONDANSETRON 2 MG/ML
4 INJECTION INTRAMUSCULAR; INTRAVENOUS ONCE
Status: CANCELLED | OUTPATIENT
Start: 2019-02-27 | End: 2019-02-27

## 2019-02-27 RX ORDER — FENTANYL CITRATE 50 UG/ML
INJECTION, SOLUTION INTRAMUSCULAR; INTRAVENOUS AS NEEDED
Status: DISCONTINUED | OUTPATIENT
Start: 2019-02-27 | End: 2019-02-27 | Stop reason: HOSPADM

## 2019-02-27 RX ORDER — LIDOCAINE HYDROCHLORIDE 10 MG/ML
INJECTION, SOLUTION EPIDURAL; INFILTRATION; INTRACAUDAL; PERINEURAL AS NEEDED
Status: DISCONTINUED | OUTPATIENT
Start: 2019-02-27 | End: 2019-02-27 | Stop reason: HOSPADM

## 2019-02-27 RX ORDER — FENTANYL CITRATE 50 UG/ML
50 INJECTION, SOLUTION INTRAMUSCULAR; INTRAVENOUS AS NEEDED
Status: CANCELLED | OUTPATIENT
Start: 2019-02-27

## 2019-02-27 RX ORDER — SODIUM CHLORIDE 0.9 % (FLUSH) 0.9 %
5-40 SYRINGE (ML) INJECTION AS NEEDED
Status: CANCELLED | OUTPATIENT
Start: 2019-02-27

## 2019-02-27 RX ORDER — MAGNESIUM SULFATE 100 %
4 CRYSTALS MISCELLANEOUS AS NEEDED
Status: CANCELLED | OUTPATIENT
Start: 2019-02-27

## 2019-02-27 RX ORDER — DEXTROSE 50 % IN WATER (D50W) INTRAVENOUS SYRINGE
25-50 AS NEEDED
Status: CANCELLED | OUTPATIENT
Start: 2019-02-27

## 2019-02-27 RX ORDER — SODIUM CHLORIDE, SODIUM LACTATE, POTASSIUM CHLORIDE, CALCIUM CHLORIDE 600; 310; 30; 20 MG/100ML; MG/100ML; MG/100ML; MG/100ML
75 INJECTION, SOLUTION INTRAVENOUS CONTINUOUS
Status: CANCELLED | OUTPATIENT
Start: 2019-02-27 | End: 2019-02-27

## 2019-02-27 RX ADMIN — SODIUM CHLORIDE: 9 INJECTION, SOLUTION INTRAVENOUS at 08:35

## 2019-02-27 RX ADMIN — PROPOFOL 75 MCG/KG/MIN: 10 INJECTION, EMULSION INTRAVENOUS at 08:41

## 2019-02-27 RX ADMIN — FENTANYL CITRATE 50 MCG: 50 INJECTION, SOLUTION INTRAMUSCULAR; INTRAVENOUS at 08:38

## 2019-02-27 RX ADMIN — IOPAMIDOL 20 ML: 612 INJECTION, SOLUTION INTRAVENOUS at 09:00

## 2019-02-27 RX ADMIN — FENTANYL CITRATE 50 MCG: 50 INJECTION, SOLUTION INTRAMUSCULAR; INTRAVENOUS at 08:44

## 2019-02-27 NOTE — Clinical Note
TRANSFER - IN REPORT:  
 
Verbal report received from: Prachi Lynne RN. Report consisted of patient's Situation, Background, Assessment and  
Recommendations(SBAR). Opportunity for questions and clarification was provided. Assessment completed upon patient's arrival to unit and care assumed. Patient transported with a Cardiac Cath Tech / Patient Care Tech.

## 2019-02-27 NOTE — ANESTHESIA PREPROCEDURE EVALUATION
Anesthetic History No history of anesthetic complications Review of Systems / Medical History Patient summary reviewed and pertinent labs reviewed Pulmonary Shortness of breath Asthma : well controlled Comments: Recent PE Neuro/Psych Within defined limits Cardiovascular Hypertension Exercise tolerance: <4 METS Comments: pulm htn Recent DVT and PE  
GI/Hepatic/Renal 
  
GERD Endo/Other Obesity, arthritis and cancer Other Findings Physical Exam 
 
Airway Mallampati: II 
TM Distance: > 6 cm Neck ROM: normal range of motion Mouth opening: Normal 
 
 Cardiovascular Regular rate and rhythm,  S1 and S2 normal,  no murmur, click, rub, or gallop Dental 
No notable dental hx Pulmonary Breath sounds clear to auscultation Abdominal 
GI exam deferred Other Findings Anesthetic Plan ASA: 4 Anesthesia type: MAC Induction: Intravenous Anesthetic plan and risks discussed with: Patient

## 2019-02-27 NOTE — Clinical Note
TRANSFER - OUT REPORT:  
 
Verbal report given to: Gisella Yeager rn. Report consisted of patient's Situation, Background, Assessment and  
Recommendations(SBAR). Opportunity for questions and clarification was provided. Patient transported with a Cardiac Cath Tech / Patient Care Tech. Patient transported to: [facility].

## 2019-02-27 NOTE — PROGRESS NOTES
TRANSFER - IN REPORT:    Verbal report received from [facility] on Yang Lau  being received from vascular lab for routine post - op      Report consisted of patients Situation, Background, Assessment and   Recommendations(SBAR). Information from the following report(s) SBAR, Procedure Summary and MAR was reviewed with the receiving nurse. Opportunity for questions and clarification was provided. Assessment completed upon patients arrival to unit and care assumed.

## 2019-02-27 NOTE — PROGRESS NOTES
Per verbal order Dr. Manuel Betts, patient will have an ultrasound of IVC in 3 months and then a follow appointment. Per Dr. Corin Lacy patient was restarted on Coumadin and PCP will follow this.

## 2019-02-27 NOTE — DISCHARGE INSTRUCTIONS
DISCHARGE SUMMARY from Nurse    PATIENT INSTRUCTIONS:    After general anesthesia or intravenous sedation, for 24 hours or while taking prescription Narcotics:  · Limit your activities  · Do not drive and operate hazardous machinery  · Do not make important personal or business decisions  · Do  not drink alcoholic beverages  · If you have not urinated within 8 hours after discharge, please contact your surgeon on call. Report the following to your surgeon:  · Excessive pain, swelling, redness or odor of or around the surgical area  · Temperature over 100.5  · Nausea and vomiting lasting longer than 4 hours or if unable to take medications  · Any signs of decreased circulation or nerve impairment to extremity: change in color, persistent  numbness, tingling, coldness or increase pain  · Any questions    What to do at Home:  Recommended activity: Activity as tolerated and no driving for today,     These are general instructions for a healthy lifestyle:    No smoking/ No tobacco products/ Avoid exposure to second hand smoke  Surgeon General's Warning:  Quitting smoking now greatly reduces serious risk to your health. Obesity, smoking, and sedentary lifestyle greatly increases your risk for illness    A healthy diet, regular physical exercise & weight monitoring are important for maintaining a healthy lifestyle    You may be retaining fluid if you have a history of heart failure or if you experience any of the following symptoms:  Weight gain of 3 pounds or more overnight or 5 pounds in a week, increased swelling in our hands or feet or shortness of breath while lying flat in bed. Please call your doctor as soon as you notice any of these symptoms; do not wait until your next office visit.     Recognize signs and symptoms of STROKE:    F-face looks uneven    A-arms unable to move or move unevenly    S-speech slurred or non-existent    T-time-call 911 as soon as signs and symptoms begin-DO NOT go       Back to bed or wait to see if you get better-TIME IS BRAIN. Warning Signs of HEART ATTACK     Call 911 if you have these symptoms:   Chest discomfort. Most heart attacks involve discomfort in the center of the chest that lasts more than a few minutes, or that goes away and comes back. It can feel like uncomfortable pressure, squeezing, fullness, or pain.  Discomfort in other areas of the upper body. Symptoms can include pain or discomfort in one or both arms, the back, neck, jaw, or stomach.  Shortness of breath with or without chest discomfort.  Other signs may include breaking out in a cold sweat, nausea, or lightheadedness. Don't wait more than five minutes to call 911 - MINUTES MATTER! Fast action can save your life. Calling 911 is almost always the fastest way to get lifesaving treatment. Emergency Medical Services staff can begin treatment when they arrive -- up to an hour sooner than if someone gets to the hospital by car. The discharge information has been reviewed with the patient. The patient verbalized understanding. Discharge medications reviewed with the patient and appropriate educational materials and side effects teaching were provided.   ___________________________________________________________________________________________________________________________________

## 2019-02-27 NOTE — OP NOTES
Preoperative diagnosis: Inferior vena cava filter    Postoperative diagnosis: Inferior vena cava filter with thrombus    Procedures performed:  #1  Ultrasound right internal jugular vein with interpretation  #2  Placement of catheter to inferior vena cava via right IJ access  #3  Inferior Vena cavogram with interpretation  #4      Cultures: None    Specimens: None    Drains: None    Estimated blood loss: Less than 50 mL    Assistants: None    Implants: Please see above    Complications: None    Anesthesia: Moderate conscious sedation of  minutes was performed by an independent provider giving the medications per my discretion and monitoring of vital signs throughout the case. Indications for the procedure:  Anastasiya Christine is a 58 y.o. female. Patient was given the appropriate risk and benefits of the procedure including but not limited to bleeding, infection, damage to adjacent structures, MI, stroke, death, loss of lower extremity, need for further surgery. Patient was understanding of all the risks and underwent a procedure. Operative findings:   #1  Inferior venacavogram shows IVC filter intact with thrombus present. Thrombus occupies more than 20% of cone    Procedure:  Patient was correctly identified in the precath area and taken to the Cath Lab in stable condition. Patient had pre-incision timeout prior to any incision. Patient was prepped and draped in the normal sterile fashion according to CDC guidelines aseptic technique.   Right IJ location was cleared I ultrasound shows a compressible right IJ is no evidence of thrombus did a single anterior puncture the IJ in place a 4 Kyrgyz sheath using a bur catheter navigated a Bentson wire down through the superior vena cava into the inferior vena cava and exchanged the sheath for the retrieval sheath at the level of the filter did the inferior vena cavogram the inferior vena cava is patent the renal vein is patent the inferior vena cava filter has For the thrombus occupies more than 20% of the, but there is flow around it. I did this picture and 2 views there is good flow around but is using the left renal vein likely for collateralization. There is no thrombus outside the cone of the filter. Made the decision to leave the filter today we will continue anticoagulation discussed the family to make sure they stay on their warfarin and left a message at her primary care doctor's office he was taking care of the warfarin orders for her. Discussed also with the [facility]. We will follow-up in a couple months with a ultrasound of the filter to see if there is any retained thrombus at that point.

## 2019-02-27 NOTE — ANESTHESIA POSTPROCEDURE EVALUATION
Procedure(s): REMOVE VENA CAVA FILTER. Anesthesia Post Evaluation Multimodal analgesia: multimodal analgesia not used between 6 hours prior to anesthesia start to PACU discharge Patient location during evaluation: bedside Patient participation: complete - patient participated Level of consciousness: awake Pain score: 0 Airway patency: patent Anesthetic complications: no 
Cardiovascular status: acceptable and hemodynamically stable Respiratory status: acceptable Hydration status: acceptable Post anesthesia nausea and vomiting:  none Visit Vitals BP 91/54 Pulse (!) 49 Resp 16 Ht 5' 7\" (1.702 m) Wt 99.3 kg (219 lb) SpO2 (!) 2% Breastfeeding? No  
BMI 34.30 kg/m²

## 2019-02-27 NOTE — Clinical Note
Sheath #1: Sheath: inserted. Sheath inserted/placed in the right internal jugular vein. Hemostasis achieved.

## 2019-02-28 ENCOUNTER — HOSPITAL ENCOUNTER (OUTPATIENT)
Dept: PHYSICAL THERAPY | Age: 64
End: 2019-02-28
Payer: COMMERCIAL

## 2019-03-04 ENCOUNTER — OFFICE VISIT (OUTPATIENT)
Dept: ORTHOPEDIC SURGERY | Facility: CLINIC | Age: 64
End: 2019-03-04

## 2019-03-04 VITALS
OXYGEN SATURATION: 96 % | SYSTOLIC BLOOD PRESSURE: 168 MMHG | TEMPERATURE: 95.9 F | RESPIRATION RATE: 16 BRPM | HEIGHT: 67 IN | BODY MASS INDEX: 34.84 KG/M2 | HEART RATE: 50 BPM | DIASTOLIC BLOOD PRESSURE: 67 MMHG | WEIGHT: 222 LBS

## 2019-03-04 DIAGNOSIS — M25.552 LEFT HIP PAIN: Primary | ICD-10-CM

## 2019-03-04 RX ORDER — WARFARIN 3 MG/1
TABLET ORAL
COMMUNITY
Start: 2019-01-16 | End: 2019-04-04 | Stop reason: SDUPTHER

## 2019-03-04 NOTE — PROGRESS NOTES
[address] [place] [address]  [phone]           Patient: Christoph Guardado                MRN: [id number]       SSN: [id number]  YOB: 1955        AGE: 58 y.o. SEX: female  Body mass index is 34.77 kg/m². PCP: Mychal Yao MD  03/04/19      This office note has been dictated. REVIEW OF SYSTEMS:  Constitutional: Negative for fever, chills, weight loss and malaise/fatigue. HENT: Negative. Eyes: Negative. Respiratory: Negative. Cardiovascular: Negative. Gastrointestinal: No bowel incontinence or constipation. Genitourinary: No bladder incontinence or saddle anesthesia. Skin: Negative. Neurological: Negative. Endo/Heme/Allergies: Negative. Psychiatric/Behavioral: Negative. Musculoskeletal: As per HPI above. Past Medical History:   Diagnosis Date    Arthritis     Breast cancer ([facility] [address]     Left    Cancer ([facility] [address] 2001    Lobular situ-Left breast    Chronic pain     DVT (deep venous thrombosis) (HCC)     GERD (gastroesophageal reflux disease)     Hx of radiation therapy     Hyperlipidemia     Hypertension     no meds    Low back pain potentially associated with radiculopathy     Menopause     Osteopenia     mild    Pulmonary embolism (HCC)     Radiation therapy complication     Left breast    S/P hip replacement     Right hip    Trochanteric bursitis of right hip     Wears glasses          Current Outpatient Medications:     warfarin (COUMADIN) 3 mg tablet, , Disp: , Rfl:     HYDROcodone-acetaminophen (NORCO)  mg tablet, Take 1-2 Tabs by mouth every six (6) hours as needed for Pain. Max Daily Amount: 8 Tabs., Disp: 56 Tab, Rfl: 0    celecoxib (CELEBREX) 200 mg capsule, Take 1 Cap by mouth every twelve (12) hours every twelve (12) hours for 90 days. , Disp: 60 Cap, Rfl: 2    oxyCODONE-acetaminophen (PERCOCET) 7.5-325 mg per tablet, Take 1-2 Tabs by mouth every six (6) hours as needed for Pain. Max Daily Amount: 8 Tabs., Disp: 56 Tab, Rfl: 0    pantoprazole (PROTONIX) 40 mg tablet, Take 40 mg by mouth daily. , Disp: , Rfl:     mometasone-formoterol (DULERA) 100-5 mcg/actuation HFA inhaler, Take 2 Puffs by inhalation two (2) times daily as needed for Other (breathing). , Disp: , Rfl:     cetirizine (ZYRTEC) 10 mg tablet, Take 10 mg by mouth daily. take 1 tab daily, Disp: , Rfl:     polyethylene glycol (MIRALAX) 17 gram packet, Take 17 g by mouth daily. , Disp: , Rfl:     albuterol (PROVENTIL, VENTOLIN) 90 mcg/actuation inhaler, Take 1-2 Puffs by inhalation every four (4) hours as needed for Wheezing., Disp: 17 g, Rfl: 0    atorvastatin (LIPITOR) 20 mg tablet, Take 20 mg by mouth nightly., Disp: , Rfl:     ferrous sulfate 325 mg (65 mg iron) tablet, Take 1 Tab by mouth two (2) times daily (with meals). , Disp: 60 Tab, Rfl: 1    docusate sodium (COLACE) 100 mg capsule, Take 1 Cap by mouth two (2) times a day for 90 days. , Disp: 60 Cap, Rfl: 2    cyclobenzaprine HCl (FLEXERIL PO), Take 5 mg by mouth as needed. , Disp: , Rfl:     guaiFENesin (MUCINEX) 1,200 mg TM12 ER tablet, Take 1,200 mg by mouth as needed. , Disp: , Rfl:     Inhalational Spacing Device (AEROCHAMBER), 1 Each by Does Not Apply route as needed. , Disp: 1 Device, Rfl: 0    CALCIUM CITRATE/VITAMIN D3 (CALCITRATE-VITAMIN D PO), Take  by mouth., Disp: , Rfl:     lubiPROStone (AMITIZA) 24 mcg capsule, Take 24 mcg by mouth.  , Disp: , Rfl:     acetaminophen (TYLENOL EXTRA STRENGTH) 500 mg tablet, Take 500 mg by mouth every six (6) hours as needed for Pain.  , Disp: , Rfl:     No Known Allergies    Social History     Socioeconomic History    Marital status:      Spouse name: Not on file    Number of children: Not on file    Years of education: Not on file    Highest education level: Not on file   Social Needs    Financial resource strain: Not on file    Food insecurity - worry: Not on file   [place] insecurity - inability: Not on file    Transportation needs - medical: Not on file   [employer] needs - non-medical: Not on file   Occupational History    Not on file   Tobacco Use    Smoking status: Former Smoker     Packs/day: 1.00     Years: 20.00     Pack years: 20.00     Types: Cigarettes    Smokeless tobacco: Never Used    Tobacco comment: QUIT APPROX 2000   Substance and Sexual Activity    Alcohol use: No     Frequency: Never     Comment: HOLIDAYS     Drug use: No    Sexual activity: Not on file   Other Topics Concern    Not on file   Social History Narrative    Not on file       Past Surgical History:   Procedure Laterality Date    HX HIP REPLACEMENT  2/12/2003    Right hip    HX MASTECTOMY  2001    Left partial    HX OTHER SURGICAL Bilateral 2014    bilat styes on eyes    HX TUBAL LIGATION  1983         We did see Ms. Berta Browning today in the office for followup with regards to her left hip replacement. The patient is now about six weeks status post surgery and is doing quite well from the hip standpoint. She went to see Dr. Rita Roach to have the IVC filter removed and was found to have a clot in the filter. She was placed back on Coumadin. She is being followed by her family doctor. Physical therapy is on hold currently. Her pain is well controlled. She has had no recent fevers, chills, or injuries to report. PHYSICAL EXAMINATION:  In general, the patient is alert and oriented x 3 in no acute distress. The patient is well-developed, well-nourished, with a normal affect. The patient is afebrile. HEENT:  Head is normocephalic and atraumatic. Pupils are equally round and reactive to light and accommodation. Extraocular eye movements are intact. Neck is supple. Trachea is midline. No JVD is present. Breathing is nonlabored. Examination of the lower extremities reveals pain-free range of motion of the hips. The surgical wound is healed up nicely.   There is no erythema, no ecchymosis, no warmth, and no signs of infection or cellulitis present. She has a negative straight leg raise. There is negative calf tenderness. There is negative Libbys. There is no evidence of DVT present. Leg lengths look perfect. Abduction strength is 4+/5 on the left and -5/5 on the right. RADIOGRAPHS:  Radiographs in the office today, including AP of the pelvis and AP and cross table lateral of the left hip, show the total hip components are well-fixed with no evidence of loosening or fracture noted. ASSESSMENT:      1. Status post left total hip replacement. 2. DVT. PLAN:  At this point, she will continue on Coumadin per Dr. Yfn Cotter and Dr. Elva Fernandez. We are going to hold physical therapy. She does have a followup appointment with Dr. Yfn Cotter on Friday. I have asked him to discuss this with him when it is okay to return to physical therapy. She denies the need for analgesics. She will follow up with us in four weeks time for evaluation.                     JR Clemente SANDERS, PA-C, ATC

## 2019-03-08 ENCOUNTER — OFFICE VISIT (OUTPATIENT)
Dept: VASCULAR SURGERY | Age: 64
End: 2019-03-08

## 2019-03-08 VITALS
SYSTOLIC BLOOD PRESSURE: 124 MMHG | RESPIRATION RATE: 16 BRPM | HEIGHT: 67 IN | HEART RATE: 70 BPM | WEIGHT: 222 LBS | BODY MASS INDEX: 34.84 KG/M2 | DIASTOLIC BLOOD PRESSURE: 82 MMHG

## 2019-03-08 DIAGNOSIS — Z79.01 ANTICOAGULATION ADEQUATE: ICD-10-CM

## 2019-03-08 DIAGNOSIS — Z95.828 PRESENCE OF IVC FILTER: Primary | ICD-10-CM

## 2019-03-08 RX ORDER — WARFARIN 3 MG/1
3 TABLET ORAL DAILY
Qty: 60 TAB | Refills: 6 | Status: SHIPPED | OUTPATIENT
Start: 2019-03-08 | End: 2020-02-04

## 2019-03-08 NOTE — PROGRESS NOTES
Ms. Samantha Woodward is here today in follow-up from her planned IVC filter retrieval    This had been placed for additional prophylaxis prior to recent hip surgery. This had been given her prior DVT and pulmonary embolism history    In conjunction with anticoagulation postoperatively, she did well with no complications. She had duplex a couple of weeks ago which showed no acute thrombus of her lower extremities nor any thrombus present in the filter. However, when evaluation was made at the time of the filter retrieval, there was notable thrombus within the filter. Therefore it was not removed. I was actually able to show her the images from the procedure. She is still on Coumadin which is being followed by her primary care physician.   The plan was to have her follow-up in about 3 months with a follow-up ultrasound and see Dr. Bushra Albright at that time, so we will continue that plan as scheduled    Of note, she is okay to resume her physical therapy as she is trying to regain her mobility so she can return to work next month

## 2019-03-08 NOTE — PROGRESS NOTES
1. Have you been to an emergency room or urgent care clinic since your last visit? No    Hospitalized since your last visit? If yes, where, when, and reason for visit? NO  2. Have you seen or consulted any other health care providers outside of the [facility] since your last visit including any procedures, health maintenance items. If yes, where, when and reason for visit?  NO

## 2019-03-12 ENCOUNTER — DOCUMENTATION ONLY (OUTPATIENT)
Dept: ORTHOPEDIC SURGERY | Facility: CLINIC | Age: 64
End: 2019-03-12

## 2019-03-12 ENCOUNTER — HOSPITAL ENCOUNTER (OUTPATIENT)
Dept: PHYSICAL THERAPY | Age: 64
Discharge: HOME | End: 2019-03-12
Payer: COMMERCIAL

## 2019-03-12 PROCEDURE — 97110 THERAPEUTIC EXERCISES: CPT

## 2019-03-12 PROCEDURE — 97530 THERAPEUTIC ACTIVITIES: CPT

## 2019-03-12 PROCEDURE — 97112 NEUROMUSCULAR REEDUCATION: CPT

## 2019-03-12 NOTE — PROGRESS NOTES
Patient dropped off Work Status Report Form to be completed and faxed to [phone] at [facility] . Patient can be reached at  [phone] when ready for pick-up.

## 2019-03-12 NOTE — PROGRESS NOTES
Work Status Report form completed and faxed--pt informed ready at [facility] location

## 2019-03-12 NOTE — PROGRESS NOTES
PT DAILY TREATMENT NOTE 10-18    Patient Name: Bang Alaniz  Date:3/12/2019  : 1955  [x]  Patient  Verified  Payor: OPTIM[employer] / Plan: [address] / Product Type: Local [employer] /    In time:  Out time:945  Total Treatment Time (min): 45  Visit #: 6 of 8    Treatment Area: Pain in left hip [M25.552]    SUBJECTIVE  Pain Level (0-10 scale): 0  Any medication changes, allergies to medications, adverse drug reactions, diagnosis change, or new procedure performed?: [x] No    [] Yes (see summary sheet for update)  Subjective functional status/changes:   [] No changes reported  \"No pain. \"    OBJECTIVE    Modality rationale: patient declined   Min Type Additional Details    [] Estim:  []Unatt       []IFC  []Premod                        []Other:  []w/ice   []w/heat  Position:  Location:    [] Estim: []Att    []TENS instruct  []NMES                    []Other:  []w/US   []w/ice   []w/heat  Position:  Location:    []  Traction: [] Cervical       []Lumbar                       [] Prone          []Supine                       []Intermittent   []Continuous Lbs:  [] before manual  [] after manual    []  Ultrasound: []Continuous   [] Pulsed                           []1MHz   []3MHz W/cm2:  Location:    []  Iontophoresis with dexamethasone         Location: [] Take home patch   [] In clinic    []  Ice     []  heat  []  Ice massage  []  Laser   []  Anodyne Position:  Location:    []  Laser with stim  []  Other:  Position:  Location:    []  Vasopneumatic Device Pressure:       [] lo [] med [] hi   Temperature: [] lo [] med [] hi   [] Skin assessment post-treatment:  []intact []redness- no adverse reaction    []redness - adverse reaction:     10 min Therapeutic Exercise:  [x] See flow sheet :   Rationale: increase ROM and increase strength to improve the patients ability to perform ADLs    10 min Therapeutic Activity:  [x]  See flow sheet : squatting, step up forward and lateral    Rationale: increase ROM, increase strength, improve coordination, improve balance and increase proprioception  to improve the patients ability to improve mobility and stance stability     25 min Neuromuscular Re-education:  [x]  See flow sheet : balance training, hip/glut re-ed activities   Rationale: increase ROM, increase strength, improve coordination, improve balance and increase proprioception  to improve the patients ability to improve mobility, stance stability, and gait        With   [x] TE   [x] TA   [x] neuro   [] other: Patient Education: [x] Review HEP    [] Progressed/Changed HEP based on:   [x] positioning   [x] body mechanics   [] transfers   [] heat/ice application    [] other:      Other Objective/Functional Measures:      Pain Level (0-10 scale) post treatment: 0    ASSESSMENT/Changes in Function: Pt continues to make progress with her hip/glut strength. Still has a Trendelenburg gait pattern. We will reassess at her NV. Patient will continue to benefit from skilled PT services to modify and progress therapeutic interventions, address functional mobility deficits, address ROM deficits, address strength deficits, analyze and address soft tissue restrictions, analyze and cue movement patterns, analyze and modify body mechanics/ergonomics, assess and modify postural abnormalities, address imbalance/dizziness and instruct in home and community integration to attain remaining goals. [x]  See Plan of Care  []  See progress note/recertification  []  See Discharge Summary         Progress towards goals / Updated goals:  Short Term Goals: To be accomplished in 2 weeks:  1.  Pt to report [place] with HEP. Eval status: HEP issued and reviewed physically/verbally with pt              MET  Long Term Goals: To be accomplished in 4 weeks. 1.  Pt to report score of 61 on FOTO, indicating improved tolerance to activity and reduced pain.               Eval status: 49 on initial FOTO              Assess at 30 day elder   2.  Pt to demonstrate full 5/5 L LE strength with MMT, indicating improved tolerance to activity and reduced fall risk. Eval status: 4-/5 hip flexion, 4/5 knee flexion, 4+/5 knee extension, 5/5 DF  Making progress  3.  Pt to be able to sleep through the night without waking secondary to pain. Eval status: currently limited secondary to pain, pt just returned to sleeping in her bed  Good improvements with pain  4.  Pt to be able to ambulate without gait deviations to improve mobility and reduce fall risk. Eval status: pt currently ambulates with SPC; she leans moderately on her cane with slight Trendelenberg pattern  Making progress, but still has a Trendelenburg gait  5.  Pt to be able to navigate 12 stairs with B HR, indicating improved balance and tolerance to activity with reduced fall risk. Eval status: painful and difficult  Good progress in the clinic  6.  Pt to be able to return to full PLOF at home and in the community with improved mobility, strength and stability, and reduced fall risk.   Eval status: limited, pt has not returned to work yet  Making progress      PLAN  []  Upgrade activities as tolerated     [x]  Continue plan of care  []  Update interventions per flow sheet       []  Discharge due to:_  []  Other:_      Sara Swan, PTA, CSCS 3/12/2019  9:54 AM    Future Appointments   Date Time Provider Anastacia Shoemaker   4/4/2019  9:55 AM Drew Larson PA-C [facility] SCHED   5/28/2019  8:00 AM [facility]AC[facility] SCHED   6/7/2019  9:30 AM Ana Rosa Juares, [address] [facility]

## 2019-03-15 ENCOUNTER — HOSPITAL ENCOUNTER (OUTPATIENT)
Dept: PHYSICAL THERAPY | Age: 64
Discharge: HOME | End: 2019-03-15
Payer: COMMERCIAL

## 2019-03-15 PROCEDURE — 97530 THERAPEUTIC ACTIVITIES: CPT

## 2019-03-15 PROCEDURE — 97110 THERAPEUTIC EXERCISES: CPT

## 2019-03-15 NOTE — PROGRESS NOTES
[facility] [place] [address]  [address], [address] [phone] fax    Physician Update  [x] Progress Note  [] Discharge Summary    Patient name: Fredrik Goodell Start of Care: 2019   Referral source: Hiram Hernandez MD : 1955                Medical Diagnosis: Pain in left hip [M25.552]  Payor: Brain Olivarez / Plan: Qing Ryan / Product Type: Local Market /  Onset Date:DOS 1/15/19                Treatment Diagnosis: L hip pain   Prior Hospitalization: see medical history Provider#: [id number]   Medications: Verified on Patient summary List    Comorbidities: breast cancer, asthma, OA, hx of DVT, hx of pulmonary embolism, HTN, s/p R MARS    Prior Level of Function: Pt reports Independent PLOF. She currently ambulates with SPC, but previously used no AD. She is a  at [employer]. Visits from Start of Care: 7    Missed Visits: 1    Progress towards Goals:  Short Term Goals: To be accomplished in 2 weeks:  1.  Pt to report [place] with HEP. Eval status: HEP issued and reviewed physically/verbally with pt              MET  Long Term Goals: To be accomplished in 4 weeks. 1.  Pt to report score of 61 on FOTO, indicating improved tolerance to activity and reduced pain.               Eval status: 49 on initial FOTO              PROGRESSIN  2.  Pt to demonstrate full 5/5 L LE strength with MMT, indicating improved tolerance to activity and reduced fall risk. Eval status: 4-/5 hip flexion, 4/5 knee flexion, 4+/5 knee extension, 5/5 DF  MET  3.  Pt to be able to sleep through the night without waking secondary to pain. Eval status: currently limited secondary to pain, pt just returned to sleeping in her bed  MET  4.  Pt to be able to ambulate without gait deviations to improve mobility and reduce fall risk.   Eval status: pt currently ambulates with SPC; she leans moderately on her cane with slight Trendelenberg pattern  PROGRESSING: Making progress, but still has a Trendelenburg gait  5.  Pt to be able to navigate 12 stairs with B HR, indicating improved balance and tolerance to activity with reduced fall risk. Eval status: painful and difficult  MET  6.  Pt to be able to return to full PLOF at home and in the community with improved mobility, strength and stability, and reduced fall risk. Eval status: limited, pt has not returned to work yet  PROGRESSING: limited with squatting/cooking -     Functional Gains: walking, strength, pain control, stairs  Functional Deficits: squatting, hip abduction strength  % improvement: 75%  Pain   Average: 1/10       Best: 0/10     Worst: 1/10  Patient Goal: \"get stronger and walk better so I can go back to work. \"      Goals: to be achieved in 4 weeks:  1.  Pt to report score of 61 on FOTO, indicating improved tolerance to activity and reduced pain. PN status  56  2.  Pt to demonstrate 5/5 L LE hip abduction strength with MMT to normalize gait. PN status:3/5  3.  Pt to be able to ambulate without gait deviations to improve mobility and reduce fall risk. PN status: Making progress, but still has a Trendelenburg gait      ASSESSMENT/RECOMMENDATIONS:  Ms. Bakari Carrillo is doing great with progression of mobility & pain control/ She continues to demonstrate some glut weakness as as as difficulty with squatting. Recommend continued PT to address remaining deficits. [x]Continue therapy per initial plan/protocol at a frequency of  2 x per week for 4 weeks    Thank you for this referral.   Karishma Partida, PT 3/15/2019 9:37 AM  NOTE TO PHYSICIAN:  [address]   FAX TO [facility]: [phone]  If you are unable to process this request in 24 hours please contact our office: [phone]    []  I have read the above report and request that my patient continue as recommended.   []  I have read the above report and request that my patient continue therapy with the following changes/special instructions:________________________________________  []I have read the above report and request that my patient be discharged from therapy.     [de-identified] Signature:____________Date:_________TIME:________    [facility], Date and Time must be completed for valid certification **

## 2019-03-18 ENCOUNTER — HOSPITAL ENCOUNTER (OUTPATIENT)
Dept: LAB | Age: 64
Discharge: HOME | End: 2019-03-18

## 2019-03-18 LAB — SENTARA SPECIMEN COL,SENBCF: NORMAL

## 2019-03-18 PROCEDURE — 99001 SPECIMEN HANDLING PT-LAB: CPT

## 2019-03-19 ENCOUNTER — HOSPITAL ENCOUNTER (OUTPATIENT)
Dept: PHYSICAL THERAPY | Age: 64
Discharge: HOME | End: 2019-03-19
Payer: COMMERCIAL

## 2019-03-19 PROCEDURE — 97112 NEUROMUSCULAR REEDUCATION: CPT

## 2019-03-19 PROCEDURE — 97110 THERAPEUTIC EXERCISES: CPT

## 2019-03-19 NOTE — PROGRESS NOTES
PT DAILY TREATMENT NOTE 10-18    Patient Name: Sally Lemus  Date:3/19/2019  : 1955  [x]  Patient  Verified  Payor: LUIS / Plan: [employer]un [employer] / Product Type: [employer] /    In time:1102  Out time:1151  Total Treatment Time (min): 52  Visit #: 1 of 8    Treatment Area: Pain in left hip [M25.552]    SUBJECTIVE  Pain Level (0-10 scale): 0  Any medication changes, allergies to medications, adverse drug reactions, diagnosis change, or new procedure performed?: [x] No    [] Yes (see summary sheet for update)  Subjective functional status/changes:   [] No changes reported  \"No pain. \"    OBJECTIVE    Modality rationale: patient declined   Min Type Additional Details    [] Estim:  []Unatt       []IFC  []Premod                        []Other:  []w/ice   []w/heat  Position:  Location:    [] Estim: []Att    []TENS instruct  []NMES                    []Other:  []w/US   []w/ice   []w/heat  Position:  Location:    []  Traction: [] Cervical       []Lumbar                       [] Prone          []Supine                       []Intermittent   []Continuous Lbs:  [] before manual  [] after manual    []  Ultrasound: []Continuous   [] Pulsed                           []1MHz   []3MHz W/cm2:  Location:    []  Iontophoresis with dexamethasone         Location: [] Take home patch   [] In clinic    []  Ice     []  heat  []  Ice massage  []  Laser   []  Anodyne Position:  Location:    []  Laser with stim  []  Other:  Position:  Location:    []  Vasopneumatic Device Pressure:       [] lo [] med [] hi   Temperature: [] lo [] med [] hi   [] Skin assessment post-treatment:  []intact []redness- no adverse reaction    []redness - adverse reaction:     24 min Therapeutic Exercise:  [x] See flow sheet :   Rationale: increase ROM and increase strength to improve the patients ability to perform ADLs    25 min Neuromuscular Re-education:  [x]  See flow sheet : hip/glut re-ed activities   Rationale: increase ROM, increase strength, improve coordination, improve balance and increase proprioception  to improve the patients ability to improve mobility, stance stability, and gait        With   [x] TE   [] TA   [x] neuro   [] other: Patient Education: [x] Review HEP    [] Progressed/Changed HEP based on:   [x] positioning   [x] body mechanics   [] transfers   [] heat/ice application    [] other:      Other Objective/Functional Measures:      Pain Level (0-10 scale) post treatment: 0    ASSESSMENT/Changes in Function: Pt is making progress with her ambulation and hip/glut strength. Did well with TRX squats. Still has a Trendelenburg gait. Patient will continue to benefit from skilled PT services to modify and progress therapeutic interventions, address functional mobility deficits, address ROM deficits, address strength deficits, analyze and address soft tissue restrictions, analyze and cue movement patterns, analyze and modify body mechanics/ergonomics, assess and modify postural abnormalities, address imbalance/dizziness and instruct in home and community integration to attain remaining goals. [x]  See Plan of Care  []  See progress note/recertification  []  See Discharge Summary         Progress towards goals / Updated goals:  1.  Pt to report score of 61 on FOTO, indicating improved tolerance to activity and reduced pain. PN status  56  2.  Pt to demonstrate 5/5 L LE hip abduction strength with MMT to normalize gait. PN status:3/5  3.  Pt to be able to ambulate without gait deviations to improve mobility and reduce fall risk.   PN status: Making progress, but still has a Trendelenburg gait    PLAN  []  Upgrade activities as tolerated     [x]  Continue plan of care  []  Update interventions per flow sheet       []  Discharge due to:_  []  Other:_      Tomer Brandon, PTA, CSCS 3/19/2019  11:58 AM    Future Appointments   Date Time Provider Anastacia Shoemaker   3/21/2019  9:30 [facility]   3/26/2019  9:00 AM Heriberto Mariee PTA [facility]   3/28/2019  9:30 AM [facility]   4/4/2019  9:55 AM George Miller PA-C [facility] SCHED   5/28/2019  8:00 AM [facility]AC[facility] SCHED   6/7/2019  9:30 AM [facility] , [address] [facility]

## 2019-03-21 ENCOUNTER — APPOINTMENT (OUTPATIENT)
Dept: PHYSICAL THERAPY | Age: 64
End: 2019-03-21
Payer: COMMERCIAL

## 2019-03-22 ENCOUNTER — HOSPITAL ENCOUNTER (OUTPATIENT)
Dept: PHYSICAL THERAPY | Age: 64
Discharge: HOME | End: 2019-03-22
Payer: COMMERCIAL

## 2019-03-22 PROCEDURE — 97110 THERAPEUTIC EXERCISES: CPT

## 2019-03-22 NOTE — PROGRESS NOTES
PT DAILY TREATMENT NOTE 10-18    Patient Name: Parag Perez  Date:3/22/2019  : 1955  [x]  Patient  Verified  Payor: Amanda Villarreal / Plan: [employer] [place] / Product Type: Local Market /    In time:4:27 Out time:5:05  Total Treatment Time (min): 38  Visit #: 2 of 8    Medicare/BCBS Only   Total Timed Codes (min):   1:1 Treatment Time:         Treatment Area: Pain in left hip [M25.552]    SUBJECTIVE  Pain Level (0-10 scale): 0/10  Any medication changes, allergies to medications, adverse drug reactions, diagnosis change, or new procedure performed?: [x] No    [] Yes (see summary sheet for update)  Subjective functional status/changes:   [] No changes reported  \"I don't have pain too much, just soreness\". OBJECTIVE    38 min Therapeutic Exercise:  [x] See flow sheet :   Rationale: increase ROM and increase strength to improve the patients ability to perform ADL's without pain. With   [x] TE   [] TA   [] neuro   [] other: Patient Education: [x] Review HEP    [] Progressed/Changed HEP based on:   [] positioning   [] body mechanics   [] transfers   [] heat/ice application    [] other:      Other Objective/Functional Measures: Pt performed TE's per flow sheet without increased symptoms. Pain Level (0-10 scale) post treatment: 0/10    ASSESSMENT/Changes in Function: Pt continued TE's requiring verbal and tactile cues. Pt reports her biggest deficit is currently strength. Patient will continue to benefit from skilled PT services to modify and progress therapeutic interventions, address functional mobility deficits, address ROM deficits, address strength deficits, analyze and address soft tissue restrictions and analyze and cue movement patterns to attain remaining goals.      []  See Plan of Care  []  See progress note/recertification  []  See Discharge Summary         Progress towards goals / Updated goals:  1.  Pt to report score of 61 on FOTO, indicating improved tolerance to activity and reduced pain.              PN status  56  2.  Pt to demonstrate 5/5 L LE hip abduction strength with MMT to normalize gait. PN status:3/5  3.  Pt to be able to ambulate without gait deviations to improve mobility and reduce fall risk.   PN status: Making progress, but still has a Trendelenburg gait       PLAN  []  Upgrade activities as tolerated     []  Continue plan of care  []  Update interventions per flow sheet       []  Discharge due to:_  []  Other:_      Chris Mera PTA 3/22/2019  4:25 PM    Future Appointments   Date Time Provider Anastacia Shoemakre   3/22/2019  5:00 PM [facility] PT [address] [facility]   3/26/2019  9:00 AM Nini Cintron PTA [facility]   3/28/2019  9:30 AM Nini Cintron PTA [facility]   4/4/2019  9:55 AM Anju Sanchez PA-C [facility] SCHED   5/28/2019  8:00 AM [facility] GRAC[facility] SCHED   6/7/2019  9:30 AM Tod Cruz, [address] [facility]

## 2019-03-26 ENCOUNTER — HOSPITAL ENCOUNTER (OUTPATIENT)
Dept: PHYSICAL THERAPY | Age: 64
Discharge: HOME | End: 2019-03-26
Payer: COMMERCIAL

## 2019-03-26 PROCEDURE — 97112 NEUROMUSCULAR REEDUCATION: CPT

## 2019-03-26 PROCEDURE — 97110 THERAPEUTIC EXERCISES: CPT

## 2019-03-26 NOTE — PROGRESS NOTES
PT DAILY TREATMENT NOTE 10-18    Patient Name: Yang Lau  Date:3/26/2019  : 1955  [x]  Patient  Verified  Payor: OPTIMA / Plan: [employer] [place] / Product Type: Local Market /    In time:  Out time:947  Total Treatment Time (min): 52  Visit #: 3 of 8    Treatment Area: Pain in left hip [M25.552]    SUBJECTIVE  Pain Level (0-10 scale): 0  Any medication changes, allergies to medications, adverse drug reactions, diagnosis change, or new procedure performed?: [x] No    [] Yes (see summary sheet for update)  Subjective functional status/changes:   [] No changes reported  \"No pain. \"    OBJECTIVE    Modality rationale: patient declined   Min Type Additional Details    [] Estim:  []Unatt       []IFC  []Premod                        []Other:  []w/ice   []w/heat  Position:  Location:    [] Estim: []Att    []TENS instruct  []NMES                    []Other:  []w/US   []w/ice   []w/heat  Position:  Location:    []  Traction: [] Cervical       []Lumbar                       [] Prone          []Supine                       []Intermittent   []Continuous Lbs:  [] before manual  [] after manual    []  Ultrasound: []Continuous   [] Pulsed                           []1MHz   []3MHz W/cm2:  Location:    []  Iontophoresis with dexamethasone         Location: [] Take home patch   [] In clinic    []  Ice     []  heat  []  Ice massage  []  Laser   []  Anodyne Position:  Location:    []  Laser with stim  []  Other:  Position:  Location:    []  Vasopneumatic Device Pressure:       [] lo [] med [] hi   Temperature: [] lo [] med [] hi   [] Skin assessment post-treatment:  []intact []redness- no adverse reaction    []redness - adverse reaction:     23 min Therapeutic Exercise:  [x] See flow sheet :   Rationale: increase ROM and increase strength to improve the patients ability to perform ADLs    24 min Neuromuscular Re-education:  [x]  See flow sheet : hip/glut re-ed activities   Rationale: increase ROM, increase strength, improve coordination, improve balance and increase proprioception  to improve the patients ability to improve mobility, stance stability, and gait        With   [x] TE   [] TA   [x] neuro   [] other: Patient Education: [x] Review HEP    [] Progressed/Changed HEP based on:   [x] positioning   [x] body mechanics   [] transfers   [] heat/ice application    [] other:      Other Objective/Functional Measures:      Pain Level (0-10 scale) post treatment: 0    ASSESSMENT/Changes in Function: Pt is making progress with her hip/glut strength. She was able to independently complete sidelying hip abduction today. Challenged with box lifts from elevated platform. Patient will continue to benefit from skilled PT services to modify and progress therapeutic interventions, address functional mobility deficits, address ROM deficits, address strength deficits, analyze and address soft tissue restrictions, analyze and cue movement patterns, analyze and modify body mechanics/ergonomics, assess and modify postural abnormalities, address imbalance/dizziness and instruct in home and community integration to attain remaining goals. [x]  See Plan of Care  []  See progress note/recertification  []  See Discharge Summary         Progress towards goals / Updated goals:  1.  Pt to report score of 61 on FOTO, indicating improved tolerance to activity and reduced pain.              PN status  56    Assess at 30 day elder   2.  Pt to demonstrate 5/5 L LE hip abduction strength with MMT to normalize gait. PN status:3/5  Making progress  3.  Pt to be able to ambulate without gait deviations to improve mobility and reduce fall risk.   PN status: Making progress, but still has a Trendelenburg gait  Continued progress    PLAN  []  Upgrade activities as tolerated     [x]  Continue plan of care  []  Update interventions per flow sheet       []  Discharge due to:_  []  Other:_      Laquita Dao, PTA, CSCS 3/26/2019  9:51 AM    Future Appointments   Date Time Provider Anastacia Shoemaker   3/28/2019  9:30 AM Mary Schmid [facility]   4/4/2019  9:55 AM Michael Moses PA-C [facility] SCHED   5/28/2019  8:00 AM [facility] SCHED   6[facility]7/2019  [facility] [address] [facility]

## 2019-03-28 ENCOUNTER — HOSPITAL ENCOUNTER (OUTPATIENT)
Dept: PHYSICAL THERAPY | Age: 64
Discharge: HOME | End: 2019-03-28
Payer: COMMERCIAL

## 2019-03-28 PROCEDURE — 97112 NEUROMUSCULAR REEDUCATION: CPT

## 2019-03-28 PROCEDURE — 97110 THERAPEUTIC EXERCISES: CPT

## 2019-03-28 NOTE — PROGRESS NOTES
PT DAILY TREATMENT NOTE 10-18    Patient Name: Nasir Briceño  Date:3/28/2019  : 1955  [x]  Patient  Verified  Payor: lSoan Almanza / Plan: [employer] [place] / Product Type: Local Market /    In time:930  Out time:1011  Total Treatment Time (min): 41  Visit #: 4 of 8    Treatment Area: Pain in left hip [M25.552]    SUBJECTIVE  Pain Level (0-10 scale): 0  Any medication changes, allergies to medications, adverse drug reactions, diagnosis change, or new procedure performed?: [x] No    [] Yes (see summary sheet for update)  Subjective functional status/changes:   [] No changes reported  \"No pain. \"    OBJECTIVE    Modality rationale: patient declined   Min Type Additional Details    [] Estim:  []Unatt       []IFC  []Premod                        []Other:  []w/ice   []w/heat  Position:  Location:    [] Estim: []Att    []TENS instruct  []NMES                    []Other:  []w/US   []w/ice   []w/heat  Position:  Location:    []  Traction: [] Cervical       []Lumbar                       [] Prone          []Supine                       []Intermittent   []Continuous Lbs:  [] before manual  [] after manual    []  Ultrasound: []Continuous   [] Pulsed                           []1MHz   []3MHz W/cm2:  Location:    []  Iontophoresis with dexamethasone         Location: [] Take home patch   [] In clinic    []  Ice     []  heat  []  Ice massage  []  Laser   []  Anodyne Position:  Location:    []  Laser with stim  []  Other:  Position:  Location:    []  Vasopneumatic Device Pressure:       [] lo [] med [] hi   Temperature: [] lo [] med [] hi   [] Skin assessment post-treatment:  []intact []redness- no adverse reaction    []redness - adverse reaction:     11 min Therapeutic Exercise:  [x] See flow sheet :   Rationale: increase ROM and increase strength to improve the patients ability to perform ADLs     30 min Neuromuscular Re-education:  [x]  See flow sheet : hip/glut re-ed activities   Rationale: increase ROM, increase strength, improve coordination, improve balance and increase proprioception  to improve the patients ability to improve mobility, stance stability, and gait        With   [x] TE   [] TA   [x] neuro   [] other: Patient Education: [x] Review HEP    [] Progressed/Changed HEP based on:   [x] positioning   [x] body mechanics   [] transfers   [] heat/ice application    [] other:      Other Objective/Functional Measures:      Pain Level (0-10 scale) post treatment: 0    ASSESSMENT/Changes in Function: Pt continues to make good progress with gait and functional mobility. Her strength in her left hip is making steady progress. Patient will continue to benefit from skilled PT services to modify and progress therapeutic interventions, address functional mobility deficits, address ROM deficits, address strength deficits, analyze and address soft tissue restrictions, analyze and cue movement patterns, analyze and modify body mechanics/ergonomics, assess and modify postural abnormalities, address imbalance/dizziness and instruct in home and community integration to attain remaining goals. [x]  See Plan of Care  []  See progress note/recertification  []  See Discharge Summary         Progress towards goals / Updated goals:  1.  Pt to report score of 61 on FOTO, indicating improved tolerance to activity and reduced pain.              PN status  56              Assess at 30 day elder   2.  Pt to demonstrate 5/5 L LE hip abduction strength with MMT to normalize gait. PN status:3/5  Making progress  3.  Pt to be able to ambulate without gait deviations to improve mobility and reduce fall risk.   PN status: Making progress, but still has a Trendelenburg gait  Continued progress    PLAN  []  Upgrade activities as tolerated     [x]  Continue plan of care  []  Update interventions per flow sheet       []  Discharge due to:_  []  Other:_      Brian Fitch PTA, CSCS 3/28/2019  10:12 AM    Future Appointments   Date Time Provider Anastacia Shoemaker   4/2/2019  9:30 AM [facility]   4/4/2019  9:00 AM [facility]   4/4/2019  9:55 AM Jc Tejada PA-C [facility] SCHED   5/28/2019  8:00 AM [facility] GRAC[facility] SCHED   6/7/2019  9:30 AM Felicia Camera, [address] [facility]

## 2019-04-02 ENCOUNTER — HOSPITAL ENCOUNTER (OUTPATIENT)
Dept: PHYSICAL THERAPY | Age: 64
Discharge: HOME | End: 2019-04-02
Payer: COMMERCIAL

## 2019-04-02 PROCEDURE — 97110 THERAPEUTIC EXERCISES: CPT

## 2019-04-02 PROCEDURE — 97112 NEUROMUSCULAR REEDUCATION: CPT

## 2019-04-02 NOTE — PROGRESS NOTES
PT DAILY TREATMENT NOTE 10-18    Patient Name: Toñito aFrfan  Date:2019  : 1955  [x]  Patient  Verified  Payor: OPTIMA / Plan: [employer] / Product Type: Local Market /    In time:9:36  Out time:10:22  Total Treatment Time (min): 55  Visit #: 5 of 8    Treatment Area: Pain in left hip [M25.552]    SUBJECTIVE  Pain Level (0-10 scale): 0  Any medication changes, allergies to medications, adverse drug reactions, diagnosis change, or new procedure performed?: [x] No    [] Yes (see summary sheet for update)  Subjective functional status/changes:   [] No changes reported  Patient states that she has no pain in her left hip but that she has soreness in her posterior left shoulder and the outside of her left knee. She states she had a normal weekend with no big changes to her status. She did state that she will be going back to work soon and that she is required to not use a cane at work. OBJECTIVE    Modality rationale: Patient declined.    Min Type Additional Details    [] Estim:  []Unatt       []IFC  []Premod                        []Other:  []w/ice   []w/heat  Position:  Location:    [] Estim: []Att    []TENS instruct  []NMES                    []Other:  []w/US   []w/ice   []w/heat  Position:  Location:    []  Traction: [] Cervical       []Lumbar                       [] Prone          []Supine                       []Intermittent   []Continuous Lbs:  [] before manual  [] after manual    []  Ultrasound: []Continuous   [] Pulsed                           []1MHz   []3MHz W/cm2:  Location:    []  Iontophoresis with dexamethasone         Location: [] Take home patch   [] In clinic    []  Ice     []  heat  []  Ice massage  []  Laser   []  Anodyne Position:  Location:    []  Laser with stim  []  Other:  Position:  Location:    []  Vasopneumatic Device Pressure:       [] lo [] med [] hi   Temperature: [] lo [] med [] hi   [] Skin assessment post-treatment:  []intact []redness- no adverse reaction    []redness - adverse reaction:     23 min Therapeutic Exercise:  [x] See flow sheet :   Rationale: increase ROM and increase strength to improve the patients ability to complete her ADLs with more efficiency. 23 min Neuromuscular Re-education:  [x]  See flow sheet :   Rationale: improve coordination, improve balance and increase proprioception  to improve the patients ability to walk independently to be able to go back to work. With   [x] TE   [] TA   [x] neuro   [] other: Patient Education: [x] Review HEP    [] Progressed/Changed HEP based on:   [x] positioning   [x] body mechanics   [] transfers   [] heat/ice application    [] other:      Pain Level (0-10 scale) post treatment: 0    ASSESSMENT/Changes in Function: Pt did well with therapy today, though she required 3 rest breaks during session due to feeling dizzy from not eating prior to coming to therapy and at one time having a hot flash. Pt symptoms were relieved with rest, drinking water, and a cold clothe. Pt able tolerate adding reps and increased weight for some exercises. After patient expressed concern about going back to work without a cane, as she is still using one now, it was discussed that next session gait training will be added to increase her confidence in her ability to ambulate unassisted. Patient will continue to benefit from skilled PT services to modify and progress therapeutic interventions, address functional mobility deficits, address ROM deficits, address strength deficits, analyze and address soft tissue restrictions, analyze and cue movement patterns, analyze and modify body mechanics/ergonomics, assess and modify postural abnormalities, address imbalance/dizziness and instruct in home and community integration to attain remaining goals.      [x]  See Plan of Care  []  See progress note/recertification  []  See Discharge Summary         Progress towards goals / Updated goals:  Progress towards goals / Updated goals:  1.  Pt to report score of 61 on FOTO, indicating improved tolerance to activity and reduced pain.              PN status  56              Assess at 30 day elder   2.  Pt to demonstrate 5/5 L LE hip abduction strength with MMT to normalize gait. PN status:3/5  Making progress  3.  Pt to be able to ambulate without gait deviations to improve mobility and reduce fall risk.   PN status: Making progress, but still has a Trendelenburg gait  Continued progress    PLAN  [x]  Upgrade activities as tolerated     []  Continue plan of care  [x]  Update interventions per flow sheet       []  Discharge due to:_  []  Other:_      BREE Morillo 4/2/2019  9:35 AM    Future Appointments   Date Time Provider Anastacia Shoemaker   4/4/2019  9:00 AM [facility]   4/4/2019  9:55 AM Lima Ram PA-C [facility]AC[facility] SCHED   5/28/2019  8:00 AM [facility] GRAC[facility] SCHED   6/7/2019  9:30 AM Fuentes Macario, [address] [facility]

## 2019-04-04 ENCOUNTER — HOSPITAL ENCOUNTER (OUTPATIENT)
Dept: PHYSICAL THERAPY | Age: 64
Discharge: HOME | End: 2019-04-04
Payer: COMMERCIAL

## 2019-04-04 ENCOUNTER — OFFICE VISIT (OUTPATIENT)
Dept: ORTHOPEDIC SURGERY | Facility: CLINIC | Age: 64
End: 2019-04-04

## 2019-04-04 VITALS
OXYGEN SATURATION: 99 % | SYSTOLIC BLOOD PRESSURE: 128 MMHG | DIASTOLIC BLOOD PRESSURE: 71 MMHG | HEIGHT: 67 IN | HEART RATE: 61 BPM | RESPIRATION RATE: 12 BRPM | TEMPERATURE: 96.2 F | BODY MASS INDEX: 34.21 KG/M2 | WEIGHT: 218 LBS

## 2019-04-04 DIAGNOSIS — Z96.642 HISTORY OF TOTAL LEFT HIP REPLACEMENT: ICD-10-CM

## 2019-04-04 DIAGNOSIS — M25.552 LEFT HIP PAIN: Primary | ICD-10-CM

## 2019-04-04 DIAGNOSIS — M70.62 TROCHANTERIC BURSITIS OF LEFT HIP: ICD-10-CM

## 2019-04-04 PROCEDURE — 97112 NEUROMUSCULAR REEDUCATION: CPT

## 2019-04-04 PROCEDURE — 97110 THERAPEUTIC EXERCISES: CPT

## 2019-04-04 NOTE — PROGRESS NOTES
[address] [place] [address]  [phone]           Patient: Bj Gama                MRN: [id number]       SSN: [id number]  YOB: 1955        AGE: 58 y.o. SEX: female  Body mass index is 34.14 kg/m². PCP: Osman Byrne MD  04/04/19      This office note has been dictated. REVIEW OF SYSTEMS:  Constitutional: Negative for fever, chills, weight loss and malaise/fatigue. HENT: Negative. Eyes: Negative. Respiratory: Negative. Cardiovascular: Negative. Gastrointestinal: No bowel incontinence or constipation. Genitourinary: No bladder incontinence or saddle anesthesia. Skin: Negative. Neurological: Negative. Endo/Heme/Allergies: Negative. Psychiatric/Behavioral: Negative. Musculoskeletal: As per HPI above. Past Medical History:   Diagnosis Date    Arthritis     Breast cancer ([facility]     Left    Cancer ([facility] 2001    Lobular situ-Left breast    Chronic pain     DVT (deep venous thrombosis) (HCC)     GERD (gastroesophageal reflux disease)     Hx of radiation therapy     Hyperlipidemia     Hypertension     no meds    Low back pain potentially associated with radiculopathy     Menopause     Osteopenia     mild    Pulmonary embolism (HCC)     Radiation therapy complication     Left breast    S/P hip replacement     Right hip    Trochanteric bursitis of right hip     Wears glasses          Current Outpatient Medications:     warfarin (COUMADIN) 3 mg tablet, Take 1 Tab by mouth daily. , Disp: 60 Tab, Rfl: 6    HYDROcodone-acetaminophen (NORCO)  mg tablet, Take 1-2 Tabs by mouth every six (6) hours as needed for Pain. Max Daily Amount: 8 Tabs., Disp: 56 Tab, Rfl: 0    celecoxib (CELEBREX) 200 mg capsule, Take 1 Cap by mouth every twelve (12) hours every twelve (12) hours for 90 days. , Disp: 60 Cap, Rfl: 2    ferrous sulfate 325 mg (65 mg iron) tablet, Take 1 Tab by mouth two (2) times daily (with meals). , Disp: 60 Tab, Rfl: 1    pantoprazole (PROTONIX) 40 mg tablet, Take 40 mg by mouth daily. , Disp: , Rfl:     cyclobenzaprine HCl (FLEXERIL PO), Take 5 mg by mouth as needed. , Disp: , Rfl:     mometasone-formoterol (DULERA) 100-5 mcg/actuation HFA inhaler, Take 2 Puffs by inhalation two (2) times daily as needed for Other (breathing). , Disp: , Rfl:     cetirizine (ZYRTEC) 10 mg tablet, Take 10 mg by mouth daily. take 1 tab daily, Disp: , Rfl:     guaiFENesin (MUCINEX) 1,200 mg TM12 ER tablet, Take 1,200 mg by mouth as needed. , Disp: , Rfl:     polyethylene glycol (MIRALAX) 17 gram packet, Take 17 g by mouth daily. , Disp: , Rfl:     albuterol (PROVENTIL, VENTOLIN) 90 mcg/actuation inhaler, Take 1-2 Puffs by inhalation every four (4) hours as needed for Wheezing., Disp: 17 g, Rfl: 0    Inhalational Spacing Device (AEROCHAMBER), 1 Each by Does Not Apply route as needed. , Disp: 1 Device, Rfl: 0    atorvastatin (LIPITOR) 20 mg tablet, Take 20 mg by mouth nightly., Disp: , Rfl:     oxyCODONE-acetaminophen (PERCOCET) 7.5-325 mg per tablet, Take 1-2 Tabs by mouth every six (6) hours as needed for Pain. Max Daily Amount: 8 Tabs., Disp: 56 Tab, Rfl: 0    docusate sodium (COLACE) 100 mg capsule, Take 1 Cap by mouth two (2) times a day for 90 days. , Disp: 60 Cap, Rfl: 2    CALCIUM CITRATE/VITAMIN D3 (CALCITRATE-VITAMIN D PO), Take  by mouth., Disp: , Rfl:     lubiPROStone (AMITIZA) 24 mcg capsule, Take 24 mcg by mouth.  , Disp: , Rfl:     acetaminophen (TYLENOL EXTRA STRENGTH) 500 mg tablet, Take 500 mg by mouth every six (6) hours as needed for Pain.  , Disp: , Rfl:     No Known Allergies    Social History     Socioeconomic History    Marital status:      Spouse name: Not on file    Number of children: Not on file    Years of education: Not on file    Highest education level: Not on file   Occupational History    Not on file   Social Needs    Financial resource strain: Not on file    Food insecurity:     Worry: Not on file     Inability: Not on file    Transportation needs:     Medical: Not on file     Non-medical: Not on file   Tobacco Use    Smoking status: Former Smoker     Packs/day: 1.00     Years: 20.00     Pack years: 20.00     Types: Cigarettes    Smokeless tobacco: Never Used    Tobacco comment: QUIT APPROX 2000   Substance and Sexual Activity    Alcohol use: No     Frequency: Never     Comment: HOLIDAYS     Drug use: No    Sexual activity: Not on file   Lifestyle    Physical activity:     Days per week: Not on file     Minutes per session: Not on file    Stress: Not on file   Relationships    Social connections:     Talks on phone: Not on file     Gets together: Not on file     Attends Islam service: Not on file     Active member of club or organization: Not on file     Attends meetings of clubs or organizations: Not on file     Relationship status: Not on file    Intimate partner violence:     Fear of current or ex partner: Not on file     Emotionally abused: Not on file     Physically abused: Not on file     Forced sexual activity: Not on file   Other Topics Concern    Not on file   Social History Narrative    Not on file       Past Surgical History:   Procedure Laterality Date    HX HIP REPLACEMENT  2/12/2003    Right hip    HX MASTECTOMY  2001    Left partial    HX OTHER SURGICAL Bilateral 2014    bilat styes on eyes    HX TUBAL LIGATION  1983         We did see Ms. Jewels Chan for followup with regards to her left hip replacement. The patient is now approaching three months status post surgery. She is doing well and is very happy with the results of the hip replacement. She is continuing physical therapy without complications. She has had no troubles with the wound and has had no recent fevers, chills, systemic changes, or injuries to report, and no chest pain or shortness of breath.      She is getting to the point where she can walk some without the cane. She is looking forward to going back to work. She is hoping she can make it back by April 15, 2019. PHYSICAL EXAMINATION:  In general, the patient is alert and oriented x 3 in no acute distress. The patient is well-developed, well-nourished, with a normal affect. The patient is afebrile. Examination of the left hip reveals the skin is intact. The surgical wound is healed nicely. There is no erythema, no ecchymosis, no warmth, and no signs of infection or cellulitis present. There is no pain with range of motion of the hip itself. She does have a little tenderness to palpation to the greater trochanteric bursae. There is negative straight leg raise. There is negative calf tenderness. There is negative Libby's. There is no evidence of DVT present. ASSESSMENT:  Status post left total hip replacement. PLAN:  At this point, the patient does have a little bursitis. She has Voltaren Gel at home. She can use this on there. She will continue physical therapy. A new prescription was given. She was given a note to return to work without restrictions on April 15, 2019. If she is unable to do her normal job functions, we may have to keep her out for a bit longer. This was discussed with her today in the office. We will plan on seeing her back in the office in about four weeks' time for evaluation.                       JR Clemente SANDERS, PAFATOU, ATC

## 2019-04-04 NOTE — LETTER
NOTIFICATION RETURN TO WORK  
 
4/4/2019 10:23 AM 
 
Ms. [address] [place] [address] [place] [id number] To Whom It May Concern: Denisa Saravia is currently under the care of [address] [place] [address]. She will return to work on: Monday, 4/15/19. No restrictions. If there are questions or concerns please have the patient contact our office.  
 
 
 
Sincerely, 
 
 
Simran Severino PA-C

## 2019-04-04 NOTE — PROGRESS NOTES
PT DAILY TREATMENT NOTE 10-18    Patient Name: Troy Jose  Date:2019  : 1955  [x]  Patient  Verified  Payor: OPTIMA / Plan: [employer] / Product Type: Local Market /    In time:902  Out time:945  Total Treatment Time (min): 43  Visit #: 6 of 8    Treatment Area: Pain in left hip [M25.552]    SUBJECTIVE  Pain Level (0-10 scale): 0  Any medication changes, allergies to medications, adverse drug reactions, diagnosis change, or new procedure performed?: [x] No    [] Yes (see summary sheet for update)  Subjective functional status/changes:   [] No changes reported  \"No pain. \"    OBJECTIVE    Modality rationale: patient declined   Min Type Additional Details    [] Estim:  []Unatt       []IFC  []Premod                        []Other:  []w/ice   []w/heat  Position:  Location:    [] Estim: []Att    []TENS instruct  []NMES                    []Other:  []w/US   []w/ice   []w/heat  Position:  Location:    []  Traction: [] Cervical       []Lumbar                       [] Prone          []Supine                       []Intermittent   []Continuous Lbs:  [] before manual  [] after manual    []  Ultrasound: []Continuous   [] Pulsed                           []1MHz   []3MHz W/cm2:  Location:    []  Iontophoresis with dexamethasone         Location: [] Take home patch   [] In clinic    []  Ice     []  heat  []  Ice massage  []  Laser   []  Anodyne Position:  Location:    []  Laser with stim  []  Other:  Position:  Location:    []  Vasopneumatic Device Pressure:       [] lo [] med [] hi   Temperature: [] lo [] med [] hi   [] Skin assessment post-treatment:  []intact []redness- no adverse reaction    []redness - adverse reaction:     13 min Therapeutic Exercise:  [x] See flow sheet :   Rationale: increase ROM and increase strength to improve the patients ability to perform ADLs    30 min Neuromuscular Re-education:  [x]  See flow sheet : hip/glut re-ed activities   Rationale: increase ROM, increase strength, improve coordination, improve balance and increase proprioception  to improve the patients ability to improve mobility, stance stability, and gait        With   [x] TE   [] TA   [x] neuro   [] other: Patient Education: [x] Review HEP    [] Progressed/Changed HEP based on:   [x] positioning   [x] body mechanics   [] transfers   [] heat/ice application    [] other:      Other Objective/Functional Measures:      Pain Level (0-10 scale) post treatment: 0    ASSESSMENT/Changes in Function: Pt is making great progress with her hip/glut strength and functional mobility. She still has a slight Trendelenburg gait, but making continual improvement. Patient will continue to benefit from skilled PT services to modify and progress therapeutic interventions, address functional mobility deficits, address ROM deficits, address strength deficits, analyze and address soft tissue restrictions, analyze and cue movement patterns, analyze and modify body mechanics/ergonomics, assess and modify postural abnormalities, address imbalance/dizziness and instruct in home and community integration to attain remaining goals. [x]  See Plan of Care  []  See progress note/recertification  []  See Discharge Summary         Progress towards goals / Updated goals:  1.  Pt to report score of 61 on FOTO, indicating improved tolerance to activity and reduced pain.              PN status  56              Assess at 30 day elder   2.  Pt to demonstrate 5/5 L LE hip abduction strength with MMT to normalize gait. PN status:3/5  Making progress  3.  Pt to be able to ambulate without gait deviations to improve mobility and reduce fall risk.   PN status: Making progress, but still has a Trendelenburg gait  Continued progress    PLAN  []  Upgrade activities as tolerated     [x]  Continue plan of care  []  Update interventions per flow sheet       []  Discharge due to:_  []  Other:_      Woo Ruffin, PTA, CSCS 4/4/2019  9:54 AM    Future Appointments   Date Time Provider Anastacia Shoemaker   4/4/2019  9:55 AM Mary Kay Martinez PA-C [facility] SCHED   5/28/2019  8:00 AM [facility] GRAC[facility] SCHED   6/7/2019  9:30 AM Ras Tran, [address] [facility]

## 2019-04-08 ENCOUNTER — DOCUMENTATION ONLY (OUTPATIENT)
Dept: ORTHOPEDIC SURGERY | Facility: CLINIC | Age: 64
End: 2019-04-08

## 2019-04-08 NOTE — PROGRESS NOTES
Pt droped off form for correction [place] [address] location. Original form attached for reviewing. .    (1 page each)    Please contact the patient when ready -she needs it asap p#[phone]

## 2019-04-09 ENCOUNTER — HOSPITAL ENCOUNTER (OUTPATIENT)
Dept: PHYSICAL THERAPY | Age: 64
Discharge: HOME | End: 2019-04-09
Payer: COMMERCIAL

## 2019-04-09 PROCEDURE — 97112 NEUROMUSCULAR REEDUCATION: CPT

## 2019-04-09 PROCEDURE — 97110 THERAPEUTIC EXERCISES: CPT

## 2019-04-09 NOTE — PROGRESS NOTES
PT DAILY TREATMENT NOTE 10-18    Patient Name: Day Tamayo  Date:2019  : 1955  [x]  Patient  Verified  Payor: OPTIMA / Plan: [employer] / Product Type: Local Market /    In time:4:53  Out time:5:39  Total Treatment Time (min): 46  Visit #: 7 of 8    Treatment Area: Pain in left hip [M25.552]    SUBJECTIVE  Pain Level (0-10 scale): 0 in her left hip  Any medication changes, allergies to medications, adverse drug reactions, diagnosis change, or new procedure performed?: [x] No    [] Yes (see summary sheet for update)  Subjective functional status/changes:   [] No changes reported  Patient states that her left hip is feeling fine, but her low back and left knee are hurting with pain at 7-8/10. Patient reports beginning to walk without her cane more at home now in preparation for going back to work next week where she can't use it. Patient also reports some hip pain when she forgets her grabber and has to bend down to pick objects off the floor. OBJECTIVE    Modality rationale: Patient declined.    Min Type Additional Details    [] Estim:  []Unatt       []IFC  []Premod                        []Other:  []w/ice   []w/heat  Position:  Location:    [] Estim: []Att    []TENS instruct  []NMES                    []Other:  []w/US   []w/ice   []w/heat  Position:  Location:    []  Traction: [] Cervical       []Lumbar                       [] Prone          []Supine                       []Intermittent   []Continuous Lbs:  [] before manual  [] after manual    []  Ultrasound: []Continuous   [] Pulsed                           []1MHz   []3MHz W/cm2:  Location:    []  Iontophoresis with dexamethasone         Location: [] Take home patch   [] In clinic    []  Ice     []  heat  []  Ice massage  []  Laser   []  Anodyne Position:  Location:    []  Laser with stim  []  Other:  Position:  Location:    []  Vasopneumatic Device Pressure:       [] lo [] med [] hi   Temperature: [] lo [] med [] hi   [] Skin assessment post-treatment:  []intact []redness- no adverse reaction    []redness - adverse reaction:     8 min Therapeutic Exercise:  [x] See flow sheet :   Rationale: increase ROM and increase strength to improve the patients ability to complete her ADLs and work tasks with more efficiency. 38 min Neuromuscular Re-education:  [x]  See flow sheet :   Rationale: improve coordination, improve balance and increase proprioception  to improve the patients ability to walk without an AD. With   [x] TE   [] TA   [x] neuro   [] other: Patient Education: [] Review HEP    [] Progressed/Changed HEP based on:   [x] positioning   [x] body mechanics   [] transfers   [] heat/ice application    [] other:       Pain Level (0-10 scale) post treatment: 0    ASSESSMENT/Changes in Function: Patient tolerated therapy well today, and is improving in her functional strength to aid in her return to work. She still demonstrates left glut med weakness, demonstrating a Trendelenberg during ambulation, and her description of her left knee and low back pain suggest her weak hip muscles are causing compensations in her surrounding joints. Patient will continue to benefit from skilled PT services to modify and progress therapeutic interventions, address functional mobility deficits, address ROM deficits, address strength deficits, analyze and address soft tissue restrictions, analyze and cue movement patterns, analyze and modify body mechanics/ergonomics, assess and modify postural abnormalities, address imbalance/dizziness and instruct in home and community integration to attain remaining goals.      [x]  See Plan of Care  []  See progress note/recertification  []  See Discharge Summary         Progress towards goals / Updated goals:  1.  Pt to report score of 61 on FOTO, indicating improved tolerance to activity and reduced pain.              PN status  56              Assess at 30 day elder   2.  Pt to demonstrate 5/5 Brandon Bishop with MMT to normalize gait. PN status:3/5  Making progress  3.  Pt to be able to ambulate without gait deviations to improve mobility and reduce fall risk.   PN status: Making progress, but still has a Trendelenburg gait  Continued progress    PLAN  [x]  Upgrade activities as tolerated     []  Continue plan of care  [x]  Update interventions per flow sheet       []  Discharge due to:_  []  Other:_      BREE Larose 4/9/2019  4:50 PM    Future Appointments   Date Time Provider Anastacia Shoemaker   4/9/2019  5:00 PM [facility]   4/11/2019 10:00 AM [facility]   4/17/2019  4:00 PM [facility]eris [facility]nez[facility]   4/19/2019  3:30 PM [facility]   4/22/2019  4:00 PM [facility] Gonzales[facility]   5/28/2019  8:00 AM [facility] SCHED   6/7/2019  9:30 AM Michela Dillard, [address] [facility]

## 2019-04-11 ENCOUNTER — HOSPITAL ENCOUNTER (OUTPATIENT)
Dept: PHYSICAL THERAPY | Age: 64
Discharge: HOME | End: 2019-04-11
Payer: COMMERCIAL

## 2019-04-11 PROCEDURE — 97110 THERAPEUTIC EXERCISES: CPT

## 2019-04-11 NOTE — PROGRESS NOTES
PT DAILY TREATMENT NOTE 10-18    Patient Name: Thad Nunez  Date:2019  : 1955  [x]  Patient  Verified  Payor: OPTIMA / Plan: [employer] / Product Type: Local Market /    In time:10:00  Out time:10:48  Total Treatment Time (min): 48  Visit #: 8 of 8    Medicare/BCBS Only   Total Timed Codes (min):   1:1 Treatment Time:         Treatment Area: Pain in left hip [M25.552]    SUBJECTIVE  Pain Level (0-10 scale): 0/10  Any medication changes, allergies to medications, adverse drug reactions, diagnosis change, or new procedure performed?: [x] No    [] Yes (see summary sheet for update)  Subjective functional status/changes:   [] No changes reported  \"I'm a little bit sore today\". OBJECTIVE      48 min Therapeutic Exercise:  [] See flow sheet :   Rationale: increase ROM and increase strength to improve the patients ability to perform ADL's without pain. With   [] TE   [] TA   [] neuro   [] other: Patient Education: [x] Review HEP    [] Progressed/Changed HEP based on:   [] positioning   [] body mechanics   [] transfers   [] heat/ice application    [] other:      Other Objective/Functional Measures:Pt performed TE's per flow sheet without pain. Pain Level (0-10 scale) post treatment: 0/10    ASSESSMENT/Changes in Function: Pt reports a 75% improvement overall improvement since beginning therapy. Pt reports she can walk better but still needs the cane. She states she still has a limp because she needs strengthening. Her future goals are to walk without a cane. Pt is progressing towards LTG's, see below. Patient will continue to benefit from skilled PT services to modify and progress therapeutic interventions, address functional mobility deficits, address ROM deficits, address strength deficits, analyze and address soft tissue restrictions and analyze and cue movement patterns to attain remaining goals.      []  See Plan of Care  []  See progress note/recertification  []  See Discharge Summary         Progress towards goals / Updated goals:  1.  Pt to report score of 61 on FOTO, indicating improved tolerance to activity and reduced pain.              PN status  56   Current: 4/11/19 FOTO score=50                 2.  Pt to demonstrate 5/5 L LE hip abduction strength with MMT to normalize gait. Current: 4/11/2019 MMT =3/5    3.  Pt to be able to ambulate without gait deviations to improve mobility and reduce fall risk.   Current: 4/11/19 Making progress, but still has a Trendelenburg gait      PLAN  []  Upgrade activities as tolerated     []  Continue plan of care  []  Update interventions per flow sheet       []  Discharge due to:_  []  Other:_      Xiao Howell, JAYLON 4/11/2019  10:03 AM    Future Appointments   Date Time Provider Anastacia Shoemaker   4/17/2019  4:00 PM [facility]   4/19/2019  3:30 PM [facility] PT [facility]   4/22/2019  4:00 PM Mamie Campa, PT [facility]   5/28/2019  8:00 AM [facility] SCHED   6/7/2019  9:30 AM Violet Gretna, [address] [facility]

## 2019-04-12 NOTE — PROGRESS NOTES
[facility] - [facility] [address]  [place], [address] [phone] fax    Physician Update  [x] Progress Note  [] Discharge Summary    Patient name: Carly Wong Start of Care: 2019   Referral source: Roma Alvarez Patient, MD : 1955                Medical Diagnosis: Pain in left hip [M25.552]  Payor: OPTIMA / Plan: Margarita Trammell / Product Type: Local Market /  Onset Date:DOS 1/15/19                Treatment Diagnosis: L hip pain   Prior Hospitalization: see medical history Provider#: [id number]   Medications: Verified on Patient summary List    Comorbidities: breast cancer, asthma, OA, hx of DVT, hx of pulmonary embolism, HTN, s/p R MARS    Prior Level of Function: Pt reports Independent PLOF.  She currently ambulates with SPC, but previously used no AD. Kathlyn Bloch is a  at [employer]. Visits from Start of Care: 15    Missed Visits: 1    Progress towards Goals:  1.  Pt to report score of 61 on FOTO, indicating improved tolerance to activity and reduced pain.              PN status  56              NOT MET 19 FOTO score=50                  2.  Pt to demonstrate 5/5 Virgel Mccreedy with MMT to normalize gait. NOT MET: 2019 MMT =3/5     3.  Pt to be able to ambulate without gait deviations to improve mobility and reduce fall risk. PROGRESSING 19 Making progress, but still has a Trendelenburg gait      Goals: to be achieved in 3 treatments:  1.  Pt to report score of 61 on FOTO, indicating improved tolerance to activity and reduced pain. PN status: FOTO score=50                  2.  Pt to demonstrate 5/5 Virgel Mccreedy with MMT to normalize gait. PN status: MMT =3/5     3.  Pt to be able to ambulate without gait deviations to improve mobility and reduce fall risk.   PN status: Making progress, but still has a Trendelenburg gait    ASSESSMENT/RECOMMENDATIONS:  [x]Continue therapy per initial plan/protocol at a frequency of  2 x per week for 3 treatments  []Continue therapy with the following recommended changes:_____________________      _____________________________________________________________________  []Discontinue therapy progressing towards or have reached established goals  []Discontinue therapy due to lack of appreciable progress towards goals  []Discontinue therapy due to lack of attendance or compliance  []Await Physician's recommendations/decisions regarding therapy  []Other:________________________________________________________________    Thank you for this referral.   Talya Lagunas, PT 4/12/2019 1:19 PM  NOTE TO PHYSICIAN:  [address] AND   FAX TO [facility]: ([phone]  If you are unable to process this request in 24 hours please contact our office: [phone]    []  I have read the above report and request that my patient continue as recommended. []  I have read the above report and request that my patient continue therapy with the following changes/special instructions:________________________________________  []I have read the above report and request that my patient be discharged from therapy.     [de-identified] Signature:____________Date:_________TIME:________    [facility], Date and Time must be completed for valid certification **

## 2019-04-17 ENCOUNTER — HOSPITAL ENCOUNTER (OUTPATIENT)
Dept: PHYSICAL THERAPY | Age: 64
Discharge: HOME | End: 2019-04-17
Payer: COMMERCIAL

## 2019-04-17 PROCEDURE — 97112 NEUROMUSCULAR REEDUCATION: CPT

## 2019-04-17 PROCEDURE — 97110 THERAPEUTIC EXERCISES: CPT

## 2019-04-17 NOTE — PROGRESS NOTES
PT DAILY TREATMENT NOTE 10-18    Patient Name: Nasir Briceño  Date:2019  : 1955  [x]  Patient  Verified  Payor: OPTIMA / Plan: [employer] / Product Type: Local [employer] /    In time:400  Out time:450  Total Treatment Time (min): 50  Visit #: 1 of 3      Treatment Area: Pain in left hip [M25.552]    SUBJECTIVE  Pain Level (0-10 scale): 0  Any medication changes, allergies to medications, adverse drug reactions, diagnosis change, or new procedure performed?: [x] No    [] Yes (see summary sheet for update)  Subjective functional status/changes:   [] No changes reported  \"no pain. \"    OBJECTIVE        15 min Therapeutic Exercise:  [x] See flow sheet :   Rationale: increase ROM and increase strength to improve the patients ability to complete her ADLs and work tasks with more efficiency. 25 min Neuromuscular Re-education:  [x]  See flow sheet :   Rationale: improve coordination, improve balance and increase proprioception  to improve the patients ability to walk without an AD. Modality rationale: decrease edema, decrease inflammation and decrease pain to improve the patients ability to improve ease with mobility.     Min Type Additional Details    [] Estim:  []Unatt       []IFC  []Premod                        []Other:  []w/ice   []w/heat  Position:  Location:    [] Estim: []Att    []TENS instruct  []NMES                    []Other:  []w/US   []w/ice   []w/heat  Position:  Location:    []  Traction: [] Cervical       []Lumbar                       [] Prone          []Supine                       []Intermittent   []Continuous Lbs:  [] before manual  [] after manual    []  Ultrasound: []Continuous   [] Pulsed                           []1MHz   []3MHz Location:  W/cm2:    []  Iontophoresis with dexamethasone         Location: [] Take home patch   [] In clinic   10 [x]  Ice     []  heat  []  Ice massage  []  Laser   []  Anodyne Position:supine with legs on wedge  Location:left hip []  Laser with stim  []  Other: Position:  Location:    []  Vasopneumatic Device Pressure:       [] lo [] med [] hi   Temperature: [] lo [] med [] hi   [x] Skin assessment post-treatment:  [x]intact []redness- no adverse reaction    []redness - adverse reaction:         With   [] TE   [] TA   [] neuro   [] other: Patient Education: [x] Review HEP    [] Progressed/Changed HEP based on:   [] positioning   [] body mechanics   [] transfers   [] heat/ice application    [] other:      Other Objective/Functional Measures:      Pain Level (0-10 scale) post treatment: 0    ASSESSMENT/Changes in Function: Ms. Jose Bell is doing well with progression of glut activities but does fatigue with multiple repetitions. emphasize progression of glut stabilization with transition to HEP next week. Patient will continue to benefit from skilled PT services to modify and progress therapeutic interventions, address functional mobility deficits, address ROM deficits, address strength deficits, analyze and address soft tissue restrictions, analyze and cue movement patterns, analyze and modify body mechanics/ergonomics, assess and modify postural abnormalities, address imbalance/dizziness and instruct in home and community integration to attain remaining goals. []  See Plan of Care  []  See progress note/recertification  []  See Discharge Summary         Goals: to be achieved in 3 treatments:  1.  Pt to report score of 61 on FOTO, indicating improved tolerance to activity and reduced pain. PN status: FOTO score=50                  2.  Pt to demonstrate 5/5 Cristina Caper with MMT to normalize gait. PN status: MMT =3/5     3.  Pt to be able to ambulate without gait deviations to improve mobility and reduce fall risk.   PN status: Making progress, but still has a Trendelenburg gait        PLAN  [x]  Upgrade activities as tolerated     []  Continue plan of care  []  Update interventions per flow sheet       [] Discharge due to:_  []  Other:_      Renato Mclain, PT 4/17/2019  2:00 PM    Future Appointments   Date Time Provider Anastacia Shoemaker   4/17/2019  4:00 PM Jesús Garcia, PT [facility]   4/19/2019  3:30 PM [facility]   4/22/2019  4:00 PM Noreen Oscar, PT [facility]   5/28/2019  8:00 AM [facility] SCHED   6/7/2019  9:30 AM Diony Casas, [address] [facility]

## 2019-04-19 ENCOUNTER — APPOINTMENT (OUTPATIENT)
Dept: PHYSICAL THERAPY | Age: 64
End: 2019-04-19
Payer: COMMERCIAL

## 2019-04-22 ENCOUNTER — HOSPITAL ENCOUNTER (OUTPATIENT)
Dept: PHYSICAL THERAPY | Age: 64
Discharge: HOME | End: 2019-04-22
Payer: COMMERCIAL

## 2019-04-22 PROCEDURE — 97110 THERAPEUTIC EXERCISES: CPT

## 2019-04-22 PROCEDURE — 97112 NEUROMUSCULAR REEDUCATION: CPT

## 2019-04-22 NOTE — PROGRESS NOTES
PT DAILY TREATMENT NOTE 10-18    Patient Name: Lisa Baca  Date:2019  : 1955  [x]  Patient  Verified  Payor: Bartolome Hicks / Plan: Dunia [employer]ocket / Product Type: Local Market /    In time: 3:47  Out time: 4:43  Total Treatment Time (min): 47  Visit #: 2 of 3    Treatment Area: Pain in left hip [M25.552]    SUBJECTIVE  Pain Level (0-10 scale): \"sore\"  Any medication changes, allergies to medications, adverse drug reactions, diagnosis change, or new procedure performed?: [x] No    [] Yes (see summary sheet for update)  Subjective functional status/changes:   [] No changes reported  Pt reports she is sore today. Pt reports that it is painful to walk without the [facility] but has to walk without it at work. OBJECTIVE    Modality rationale: decrease edema, decrease inflammation and decrease pain to improve the patients ability to improve ease with mobility.     Min Type Additional Details    [] Estim:  []Unatt       []IFC  []Premod                        []Other:  []w/ice   []w/heat  Position:  Location:    [] Estim: []Att    []TENS instruct  []NMES                    []Other:  []w/US   []w/ice   []w/heat  Position:  Location:    []  Traction: [] Cervical       []Lumbar                       [] Prone          []Supine                       []Intermittent   []Continuous Lbs:  [] before manual  [] after manual    []  Ultrasound: []Continuous   [] Pulsed                           []1MHz   []3MHz Location:  W/cm2:    []  Iontophoresis with dexamethasone         Location: [] Take home patch   [] In clinic   10 [x]  Ice     []  heat  []  Ice massage  []  Laser   []  Anodyne Position:supine with legs on wedge  Location:left hip    []  Laser with stim  []  Other: Position:  Location:    []  Vasopneumatic Device Pressure:       [] lo [] med [] hi   Temperature: [] lo [] med [] hi   [x] Skin assessment post-treatment:  [x]intact []redness- no adverse reaction    []redness - adverse reaction:    20 min Therapeutic Exercise:  [x] See flow sheet :   Rationale: increase ROM and increase strength to improve the patients ability to complete her ADLs and work tasks with more efficiency. 24 min Neuromuscular Re-education:  [x]  See flow sheet :   Rationale: improve coordination, improve balance and increase proprioception  to improve the patients ability to walk without an AD. With   [] TE   [] TA   [] neuro   [] other: Patient Education: [x] Review HEP    [] Progressed/Changed HEP based on:   [] positioning   [] body mechanics   [] transfers   [] heat/ice application    [] other:      Other Objective/Functional Measures: Added green tband to hipx3, BOSU step ups, BOSU 1/2 stance balance with EO exercise to improve strength in the LEs. 1-2 UE HHA used for BOSU exercises for balance/stability. Pain Level (0-10 scale) post treatment: 0 \"sore\"    ASSESSMENT/Changes in Function: Challenged with endurance and strength with BOSU step ups and only able to perform 5 reps before fatigue. Mild-moderate trendelenburg gait noted with ambulation. We will plan on d/c'ing pt NV to HEP for self management of symptoms. Patient will continue to benefit from skilled PT services to modify and progress therapeutic interventions, address functional mobility deficits, address ROM deficits, address strength deficits, analyze and address soft tissue restrictions, analyze and cue movement patterns, analyze and modify body mechanics/ergonomics, assess and modify postural abnormalities, address imbalance/dizziness and instruct in home and community integration to attain remaining goals. []  See Plan of Care  []  See progress note/recertification  []  See Discharge Summary         Goals: to be achieved in 3 treatments:  1.  Pt to report score of 61 on FOTO, indicating improved tolerance to activity and reduced pain.               PN status: FOTO score=50               2.  Pt to demonstrate 5/5 L LE hip abduction strength with MMT to normalize gait. PN status: MMT =3/5   3.  Pt to be able to ambulate without gait deviations to improve mobility and reduce fall risk.   PN status: Making progress, but still has a Trendelenburg gait    PLAN  [x]  Upgrade activities as tolerated     [x]  Continue plan of care  [x]  Update interventions per flow sheet       []  Discharge due to:_  []  Other:_      Drenda Falling, PT 4/22/2019  3:50 PM    Future Appointments   Date Time Provider Anastacia Shoemaker   4/25/2019 10:00 AM [facility]   5/28/2019  8:00 AM [facility] SCHED   6/7/2019  9:30 AM Ethel Senior, [address] [facility]

## 2019-04-25 ENCOUNTER — HOSPITAL ENCOUNTER (OUTPATIENT)
Dept: PHYSICAL THERAPY | Age: 64
Discharge: HOME | End: 2019-04-25
Payer: COMMERCIAL

## 2019-04-25 PROCEDURE — 97110 THERAPEUTIC EXERCISES: CPT

## 2019-05-01 NOTE — PROGRESS NOTES
[facility]   [address]  [place], [address] [phone] fax    Discharge Summary  Patient name: Carly Duncan Start of Care: 2019   Referral source: Donal Alvarez MD : 1955                Medical Diagnosis: Pain in left hip [M25.552]  Payor: OPTIMA / Plan: Mateo Sloop / Product Type: Local Market /  Onset Date:DOS 1/15/19                Treatment Diagnosis: L hip pain   Prior Hospitalization: see medical history Provider#: [id number]   Medications: Verified on Patient summary List    Comorbidities: breast cancer, asthma, OA, hx of DVT, hx of pulmonary embolism, HTN, s/p R MARS    Prior Level of Function: Pt reports Independent PLOF.  She currently ambulates with SPC, but previously used no AD. Liliana Bowie is a  [facility] [employer]. Visits from Start of Care: 18    Missed Visits: 1  Reporting Period : 2019 to 2019    Goal: Pt to report score of 61 on FOTO, indicating improved tolerance to activity and reduced pain. Status at last note/certification: Not Met FOTO score= 55     Status at discharge: not met    Goal: Pt to demonstrate 5/5 Tameka Mayans with MMT to normalize gait.   Status at last note/certification: Not Met MMT= 4/5  Status at discharge: not met    Goal: Pt to be able to ambulate without gait deviations to improve mobility and reduce fall risk. Status at last note/certification: Not met, still has a Trendelenburg gait  Status at discharge: not met    Assessment/Summary of care:   Pt was seen for 18 therapy sessions. Pt demonstrated/reported improvements in strength, ambulation quality, and mobility since starting therapy. Pt reports 90% improvement in symptoms since start of care. Pt reports she continues to have difficulty with donning shoes and she ambulates with a trendelenburg gait. Pt educated to continue HEP exercises at home to address current deficits. Pt is d/c'ed from therapy at this time to HEP secondary to ability to independently perform HEP to manage current deficits. RECOMMENDATIONS:  [x]Discontinue therapy: [x]Patient has reached or is progressing toward set goals.        []Patient is non-compliant or has abdicated      []Due to lack of appreciable progress towards set goals    Parish Navarro, PT 5/1/2019 11:14 AM

## 2019-06-07 ENCOUNTER — OFFICE VISIT (OUTPATIENT)
Dept: VASCULAR SURGERY | Age: 64
End: 2019-06-07

## 2019-06-07 VITALS
RESPIRATION RATE: 20 BRPM | WEIGHT: 218 LBS | HEIGHT: 67 IN | HEART RATE: 54 BPM | SYSTOLIC BLOOD PRESSURE: 130 MMHG | OXYGEN SATURATION: 97 % | BODY MASS INDEX: 34.21 KG/M2 | DIASTOLIC BLOOD PRESSURE: 80 MMHG

## 2019-06-07 DIAGNOSIS — Z86.718 PERSONAL HISTORY OF DVT (DEEP VEIN THROMBOSIS): Primary | ICD-10-CM

## 2019-06-07 DIAGNOSIS — Z95.828 PRESENCE OF IVC FILTER: ICD-10-CM

## 2019-06-07 NOTE — PROGRESS NOTES
Felipe Almeida    Chief Complaint   Patient presents with    Surgical Follow-up       History and Physical    76-year-old female with history of bilateral joint replacement now. She had history of DVT and IVC filter placement for protection from thromboembolism she also has a history of thromboembolism. When we went for filter retrieval as she was found to have a large thrombus retained in the filter itself so anticoagulation was continued and the filter was left in place. Is been 3 months since that time she had a Doppler that showed no presence of thrombus in the inferior vena cava but the filter itself was not visualized. Past Medical History:   Diagnosis Date    Arthritis     Breast cancer ([address]     Left    Cancer ([address] 2001    Lobular situ-Left breast    Chronic pain     DVT (deep venous thrombosis) (HCC)     GERD (gastroesophageal reflux disease)     Hx of radiation therapy     Hyperlipidemia     Hypertension     no meds    Low back pain potentially associated with radiculopathy     Menopause     Osteopenia     mild    Pulmonary embolism (HCC)     Radiation therapy complication     Left breast    S/P hip replacement     Right hip    Trochanteric bursitis of right hip     Wears glasses      Patient Active Problem List   Diagnosis Code    Left Breast carcinoma 2001 C50.919    S/P hip replacement Z96.649    Hyperlipidemia E78.5    Acute pulmonary embolism (HCC) I26.99    DVT (deep venous thrombosis) (HCC) I82.409    Troponin level elevated R74.8    Dyspnea R06.00    Pulmonary HTN (HCC) I27.20    Severe obesity (BMI 35.0-39. 9) with comorbidity ([address] E66.01    Hip arthritis M16.10    History of pulmonary embolism Z86.711     Past Surgical History:   Procedure Laterality Date    HX HIP REPLACEMENT  2/12/2003    Right hip    HX MASTECTOMY  2001    Left partial    HX OTHER SURGICAL Bilateral 2014    bilclovis day on eyes    [address]     Current Outpatient Medications   Medication Sig Dispense Refill    warfarin (COUMADIN) 3 mg tablet Take 1 Tab by mouth daily. 60 Tab 6    HYDROcodone-acetaminophen (NORCO)  mg tablet Take 1-2 Tabs by mouth every six (6) hours as needed for Pain. Max Daily Amount: 8 Tabs. 56 Tab 0    ferrous sulfate 325 mg (65 mg iron) tablet Take 1 Tab by mouth two (2) times daily (with meals). 60 Tab 1    pantoprazole (PROTONIX) 40 mg tablet Take 40 mg by mouth daily.  cyclobenzaprine HCl (FLEXERIL PO) Take 5 mg by mouth as needed.  mometasone-formoterol (DULERA) 100-5 mcg/actuation HFA inhaler Take 2 Puffs by inhalation two (2) times daily as needed for Other (breathing).  cetirizine (ZYRTEC) 10 mg tablet Take 10 mg by mouth daily. take 1 tab daily      guaiFENesin (MUCINEX) 1,200 mg TM12 ER tablet Take 1,200 mg by mouth as needed.  polyethylene glycol (MIRALAX) 17 gram packet Take 17 g by mouth daily.  albuterol (PROVENTIL, VENTOLIN) 90 mcg/actuation inhaler Take 1-2 Puffs by inhalation every four (4) hours as needed for Wheezing. 17 g 0    Inhalational Spacing Device (AEROCHAMBER) 1 Each by Does Not Apply route as needed. 1 Device 0    atorvastatin (LIPITOR) 20 mg tablet Take 20 mg by mouth nightly.  oxyCODONE-acetaminophen (PERCOCET) 7.5-325 mg per tablet Take 1-2 Tabs by mouth every six (6) hours as needed for Pain. Max Daily Amount: 8 Tabs. 56 Tab 0    CALCIUM CITRATE/VITAMIN D3 (CALCITRATE-VITAMIN D PO) Take  by mouth.  lubiPROStone (AMITIZA) 24 mcg capsule Take 24 mcg by mouth.  acetaminophen (TYLENOL EXTRA STRENGTH) 500 mg tablet Take 500 mg by mouth every six (6) hours as needed for Pain. No Known Allergies    Review of Systems    A full review of systems was completed times ten organ systems and was deemed negative unless otherwise mentioned in the HPI.     Physical   Visit Vitals  /80 (BP 1 Location: Right arm, BP Patient Position: Sitting)   Pulse (!) 54   Resp 20   Ht 5' 7\" (1.702 m)   Wt 218 lb (98.9 kg)   SpO2 97%   BMI 34.14 kg/m²       She is in good spirits today she looks well  Head is normocephalic  Neck no JVD  Chest is clear  Cardiac regular  Extremities without edema  No signs of arterial deficit  Doppler study shows no caval thrombosis      Impression/Plan:     ICD-10-CM ICD-9-CM    1. Personal history of DVT (deep vein thrombosis) Z86.718 V12.51    2. Presence of IVC filter Z95.828 V45.89    Had a lengthy discussion with her today we talked about different options  This includes  Leaving the filter and no further procedures. Repeating that venogram for presence of thrombus and with intent of filter retrieval.  She is opting for this and understands that if there is retained thrombus or fixation of the filter it may stay as a permanent feature if the thrombus has resolved and is retrievable taken out at that time. No orders of the defined types were placed in this encounter. Estella Lundborg, MD    PLEASE NOTE:  This document has been produced using voice recognition software. Unrecognized errors in transcription may be present.

## 2019-06-07 NOTE — PROGRESS NOTES
1. Have you been to the ER, urgent care clinic since your last visit? Hospitalized since your last visit? No    2. Have you seen or consulted any other health care providers outside of the [facility] since your last visit? Include any pap smears or colon screening.  No     3 most recent PHQ Screens 6/7/2019   PHQ Not Done -   Little interest or pleasure in doing things Not at all   Feeling down, depressed, irritable, or hopeless Not at all   Total Score PHQ 2 0

## 2019-06-25 RX ORDER — SODIUM CHLORIDE 0.9 % (FLUSH) 0.9 %
5-40 SYRINGE (ML) INJECTION EVERY 8 HOURS
Status: CANCELLED | OUTPATIENT
Start: 2019-06-25

## 2019-06-25 RX ORDER — SODIUM CHLORIDE 0.9 % (FLUSH) 0.9 %
5-40 SYRINGE (ML) INJECTION AS NEEDED
Status: CANCELLED | OUTPATIENT
Start: 2019-06-25

## 2019-06-25 NOTE — H&P
History and Physical    78-year-old female with history of bilateral joint replacement now. She had history of DVT and IVC filter placement for protection from thromboembolism she also has a history of thromboembolism. When we went for filter retrieval as she was found to have a large thrombus retained in the filter itself so anticoagulation was continued and the filter was left in place. Is been 3 months since that time she had a Doppler that showed no presence of thrombus in the inferior vena cava but the filter itself was not visualized.          Past Medical History:   Diagnosis Date    Arthritis      Breast cancer ([address]       Left    Cancer ([address] 2001     Lobular situ-Left breast    Chronic pain      DVT (deep venous thrombosis) (HCC)      GERD (gastroesophageal reflux disease)      Hx of radiation therapy      Hyperlipidemia      Hypertension       no meds    Low back pain potentially associated with radiculopathy      Menopause      Osteopenia       mild    Pulmonary embolism (HCC)      Radiation therapy complication       Left breast    S/P hip replacement       Right hip    Trochanteric bursitis of right hip      Wears glasses             Patient Active Problem List   Diagnosis Code    Left Breast carcinoma 2001 C50.919    S/P hip replacement Z96.649    Hyperlipidemia E78.5    Acute pulmonary embolism (HCC) I26.99    DVT (deep venous thrombosis) (HCC) I82.409    Troponin level elevated R74.8    Dyspnea R06.00    Pulmonary HTN (HCC) I27.20    Severe obesity (BMI 35.0-39. 9) with comorbidity ([address] E66.01    Hip arthritis M16.10    History of pulmonary embolism Z86. 711            Past Surgical History:   Procedure Laterality Date    HX HIP REPLACEMENT   2/12/2003     Right hip    HX MASTECTOMY   2001     Left partial    HX OTHER SURGICAL Bilateral 2014     bilat styes on eyes    HX TUBAL LIGATION   1983             Current Outpatient Medications   Medication Sig Dispense Refill    warfarin (COUMADIN) 3 mg tablet Take 1 Tab by mouth daily. 60 Tab 6    HYDROcodone-acetaminophen (NORCO)  mg tablet Take 1-2 Tabs by mouth every six (6) hours as needed for Pain. Max Daily Amount: 8 Tabs. 56 Tab 0    ferrous sulfate 325 mg (65 mg iron) tablet Take 1 Tab by mouth two (2) times daily (with meals). 60 Tab 1    pantoprazole (PROTONIX) 40 mg tablet Take 40 mg by mouth daily.        cyclobenzaprine HCl (FLEXERIL PO) Take 5 mg by mouth as needed.        mometasone-formoterol (DULERA) 100-5 mcg/actuation HFA inhaler Take 2 Puffs by inhalation two (2) times daily as needed for Other (breathing).        cetirizine (ZYRTEC) 10 mg tablet Take 10 mg by mouth daily. take 1 tab daily        guaiFENesin (MUCINEX) 1,200 mg TM12 ER tablet Take 1,200 mg by mouth as needed.        polyethylene glycol (MIRALAX) 17 gram packet Take 17 g by mouth daily.        albuterol (PROVENTIL, VENTOLIN) 90 mcg/actuation inhaler Take 1-2 Puffs by inhalation every four (4) hours as needed for Wheezing. 17 g 0    Inhalational Spacing Device (AEROCHAMBER) 1 Each by Does Not Apply route as needed. 1 Device 0    atorvastatin (LIPITOR) 20 mg tablet Take 20 mg by mouth nightly.        oxyCODONE-acetaminophen (PERCOCET) 7.5-325 mg per tablet Take 1-2 Tabs by mouth every six (6) hours as needed for Pain. Max Daily Amount: 8 Tabs.  56 Tab 0    CALCIUM CITRATE/VITAMIN D3 (CALCITRATE-VITAMIN D PO) Take  by mouth.        lubiPROStone (AMITIZA) 24 mcg capsule Take 24 mcg by mouth.          acetaminophen (TYLENOL EXTRA STRENGTH) 500 mg tablet Take 500 mg by mouth every six (6) hours as needed for Pain.            No Known Allergies     Review of Systems    A full review of systems was completed times ten organ systems and was deemed negative unless otherwise mentioned in the HPI.     Physical   Visit Vitals  /80 (BP 1 Location: Right arm, BP Patient Position: Sitting)   Pulse (!) 54   Resp 20   Ht 5' 7\" (1.702 m) Wt 218 lb (98.9 kg)   SpO2 97%   BMI 34.14 kg/m²         She is in good spirits today she looks well  Head is normocephalic  Neck no JVD  Chest is clear  Cardiac regular  Extremities without edema  No signs of arterial deficit  Doppler study shows no caval thrombosis        Impression/Plan:       ICD-10-CM ICD-9-CM     1. Personal history of DVT (deep vein thrombosis) Z86.718 V12.51     2. Presence of IVC filter Z95.828 V45.89     Had a lengthy discussion with her today we talked about different options  This includes  Leaving the filter and no further procedures. Repeating that venogram for presence of thrombus and with intent of filter retrieval.  She is opting for this and understands that if there is retained thrombus or fixation of the filter it may stay as a permanent feature if the thrombus has resolved and is retrievable taken out at that time.   No orders of the defined types were placed in this encounter

## 2019-06-26 ENCOUNTER — ANESTHESIA EVENT (OUTPATIENT)
Dept: CARDIAC CATH/INVASIVE PROCEDURES | Age: 64
End: 2019-06-26
Payer: COMMERCIAL

## 2019-06-26 ENCOUNTER — ANESTHESIA (OUTPATIENT)
Dept: CARDIAC CATH/INVASIVE PROCEDURES | Age: 64
End: 2019-06-26
Payer: COMMERCIAL

## 2019-06-26 ENCOUNTER — HOSPITAL ENCOUNTER (OUTPATIENT)
Age: 64
Setting detail: OUTPATIENT SURGERY
Discharge: HOME | End: 2019-06-26
Attending: SURGERY | Admitting: SURGERY
Payer: COMMERCIAL

## 2019-06-26 VITALS
OXYGEN SATURATION: 96 % | RESPIRATION RATE: 25 BRPM | BODY MASS INDEX: 31.84 KG/M2 | HEIGHT: 69 IN | SYSTOLIC BLOOD PRESSURE: 132 MMHG | WEIGHT: 215 LBS | HEART RATE: 46 BPM | DIASTOLIC BLOOD PRESSURE: 77 MMHG

## 2019-06-26 DIAGNOSIS — Z86.718 PERSONAL HISTORY OF VENOUS THROMBOSIS AND EMBOLISM: ICD-10-CM

## 2019-06-26 LAB
BUN BLD-MCNC: 12 MG/DL (ref 7–18)
CHLORIDE BLD-SCNC: 103 MMOL/L (ref 100–108)
GLUCOSE BLD STRIP.AUTO-MCNC: 89 MG/DL (ref 74–106)
HCT VFR BLD CALC: 39 % (ref 36–49)
HGB BLD-MCNC: 13.3 G/DL (ref 12–16)
POTASSIUM BLD-SCNC: 3.5 MMOL/L (ref 3.5–5.5)
SODIUM BLD-SCNC: 143 MMOL/L (ref 136–145)

## 2019-06-26 PROCEDURE — 77030013744: Performed by: SURGERY

## 2019-06-26 PROCEDURE — 82947 ASSAY GLUCOSE BLOOD QUANT: CPT

## 2019-06-26 PROCEDURE — 77030004565 HC CATH ANGI DX TMPO CARD -B: Performed by: SURGERY

## 2019-06-26 PROCEDURE — 74011250636 HC RX REV CODE- 250/636

## 2019-06-26 PROCEDURE — C1769 GUIDE WIRE: HCPCS | Performed by: SURGERY

## 2019-06-26 PROCEDURE — 74011636320 HC RX REV CODE- 636/320: Performed by: SURGERY

## 2019-06-26 PROCEDURE — C1894 INTRO/SHEATH, NON-LASER: HCPCS | Performed by: SURGERY

## 2019-06-26 PROCEDURE — 37193 REM ENDOVAS VENA CAVA FILTER: CPT | Performed by: SURGERY

## 2019-06-26 PROCEDURE — 74011250636 HC RX REV CODE- 250/636: Performed by: SURGERY

## 2019-06-26 PROCEDURE — 76060000034 HC ANESTHESIA 1.5 TO 2 HR: Performed by: SURGERY

## 2019-06-26 PROCEDURE — 77030016715 HC CATH ANGI DX TMPO2 CARD -B: Performed by: SURGERY

## 2019-06-26 PROCEDURE — C1773 RET DEV, INSERTABLE: HCPCS | Performed by: SURGERY

## 2019-06-26 RX ORDER — LIDOCAINE HYDROCHLORIDE 10 MG/ML
INJECTION, SOLUTION EPIDURAL; INFILTRATION; INTRACAUDAL; PERINEURAL AS NEEDED
Status: DISCONTINUED | OUTPATIENT
Start: 2019-06-26 | End: 2019-06-26 | Stop reason: HOSPADM

## 2019-06-26 RX ORDER — FENTANYL CITRATE 50 UG/ML
INJECTION, SOLUTION INTRAMUSCULAR; INTRAVENOUS AS NEEDED
Status: DISCONTINUED | OUTPATIENT
Start: 2019-06-26 | End: 2019-06-26 | Stop reason: HOSPADM

## 2019-06-26 RX ORDER — MIDAZOLAM HYDROCHLORIDE 1 MG/ML
INJECTION, SOLUTION INTRAMUSCULAR; INTRAVENOUS AS NEEDED
Status: DISCONTINUED | OUTPATIENT
Start: 2019-06-26 | End: 2019-06-26 | Stop reason: HOSPADM

## 2019-06-26 RX ORDER — SODIUM CHLORIDE 9 MG/ML
INJECTION, SOLUTION INTRAVENOUS
Status: DISCONTINUED | OUTPATIENT
Start: 2019-06-26 | End: 2019-06-26 | Stop reason: HOSPADM

## 2019-06-26 RX ORDER — PROPOFOL 10 MG/ML
INJECTION, EMULSION INTRAVENOUS
Status: DISCONTINUED | OUTPATIENT
Start: 2019-06-26 | End: 2019-06-26 | Stop reason: HOSPADM

## 2019-06-26 RX ADMIN — SODIUM CHLORIDE: 9 INJECTION, SOLUTION INTRAVENOUS at 12:54

## 2019-06-26 RX ADMIN — FENTANYL CITRATE 50 MCG: 50 INJECTION, SOLUTION INTRAMUSCULAR; INTRAVENOUS at 12:57

## 2019-06-26 RX ADMIN — PROPOFOL 25 MCG/KG/MIN: 10 INJECTION, EMULSION INTRAVENOUS at 13:02

## 2019-06-26 RX ADMIN — MIDAZOLAM HYDROCHLORIDE 2 MG: 1 INJECTION, SOLUTION INTRAMUSCULAR; INTRAVENOUS at 12:56

## 2019-06-26 RX ADMIN — FENTANYL CITRATE 50 MCG: 50 INJECTION, SOLUTION INTRAMUSCULAR; INTRAVENOUS at 12:55

## 2019-06-26 NOTE — DISCHARGE INSTRUCTIONS
Patient Education        Vena Cava Filter Removal: What to Expect at Home  Your Recovery    A vena cava filter helps prevent blood clots from traveling to your lungs and heart, where they may block blood flow. Your doctor removed your vena cava filter using a thin, flexible tube (catheter) that was put into your vein. A filter is removed to avoid problems that can happen if the filter is left in your vein for a long time. Vena cava filters can cause serious health problems if they break or become blocked with one or more blood clots. Most people go home a few hours after the procedure. You will probably be able to return to work or your normal routine in a day or two. After having a vena cava filter removed, you may feel tired and have some pain for several days. You may have a small bandage where the catheter was placed. This care sheet gives you a general idea about how long it will take for you to recover. But each person recovers at a different pace. Follow the steps below to feel better as quickly as possible. How can you care for yourself at home? Activity    · Take it easy for a day or two. Avoid strenuous activities, such as bicycle riding, jogging, weight lifting, or aerobic exercise, until your doctor says it is okay. Diet    · You can eat your normal diet. If your stomach is upset, try bland, low-fat foods like plain rice, broiled chicken, toast, and yogurt.     · Drink plenty of fluids to avoid becoming dehydrated. Medicines    · Your doctor will tell you if and when you can restart your medicines. He or she will also give you instructions about taking any new medicines.     · If you take blood thinners, such as warfarin (Coumadin), clopidogrel (Plavix), or aspirin, be sure to talk to your doctor. He or she will tell you if and when to start taking those medicines again. Make sure that you understand exactly what your doctor wants you to do.     · Be safe with medicines.  Take pain medicines exactly as directed. ? If the doctor gave you a prescription medicine for pain, take it as prescribed. ? If you are not taking a prescription pain medicine, ask your doctor if you can take an over-the-counter medicine.     · If you think your pain medicine is making you sick to your stomach:  ? Take your medicine after meals (unless your doctor has told you not to). ? Ask your doctor for a different pain medicine.    Care of the catheter site    · Keep a bandage over the spot where the catheter was inserted for the first day, or for as long as your doctor recommends.     · Put ice or a cold pack on the area for 10 to 20 minutes at a time to help with soreness or swelling. Do this every few hours. Put a thin cloth between the ice and your skin. Follow-up care is a key part of your treatment and safety. Be sure to make and go to all appointments, and call your doctor if you are having problems. It's also a good idea to know your test results and keep a list of the medicines you take. When should you call for help? Call 911 anytime you think you may need emergency care. For example, call if:    · You passed out (lost consciousness).     · You have severe trouble breathing.     · You have sudden chest pain and shortness of breath, or you cough up blood.    Call your doctor now or seek immediate medical care if:    · You have pain that does not get better after you take pain medicine.     · You are bleeding through your dressing. A small amount of bleeding is normal.     · You have a fast-growing, painful lump at the catheter site.     · You have signs of infection, such as:  ? Increased pain, swelling, warmth, or redness. ? Red streaks leading from the incision. ? Pus draining from the incision. ? A fever.     · You have symptoms of a blood clot in your leg, such as:  ? Pain in the calf, back of the knee, thigh, or groin. ?  Redness and swelling in your leg or groin.    Watch closely for any changes in your health, and be sure to contact your doctor if you have any problems. Where can you learn more? Go to http://rain-tracey.info/. Enter Z203 in the search box to learn more about \"Vena Cava Filter Removal: What to Expect at Home. \"  Current as of: September 26, 2018  Content Version: 11.9  © 7340-6913 [employer], [employer]. Care instructions adapted under license by [employer] (which disclaims liability or warranty for this information). If you have questions about a medical condition or this instruction, always ask your healthcare professional. [address] any warranty or liability for your use of this information.

## 2019-06-26 NOTE — ANESTHESIA PREPROCEDURE EVALUATION
Relevant Problems   No relevant active problems       Anesthetic History   No history of anesthetic complications            Review of Systems / Medical History  Patient summary reviewed and pertinent labs reviewed    Pulmonary  Within defined limits                 Neuro/Psych   Within defined limits           Cardiovascular    Hypertension: well controlled              Exercise tolerance: >4 METS     GI/Hepatic/Renal     GERD: well controlled           Endo/Other        Morbid obesity and arthritis     Other Findings              Physical Exam    Airway  Mallampati: III  TM Distance: 4 - 6 cm  Neck ROM: decreased range of motion   Mouth opening: Normal     Cardiovascular    Rhythm: regular  Rate: normal         Dental    Dentition: Full lower dentures and Full upper dentures     Pulmonary  Breath sounds clear to auscultation               Abdominal  GI exam deferred       Other Findings            Anesthetic Plan    ASA: 3  Anesthesia type: MAC            Anesthetic plan and risks discussed with: Patient

## 2019-06-26 NOTE — Clinical Note
IVC consent obtained. Plan for Filter retrieval. IVC site prepped. Right internal jugular accessed. IVC wire inserted. IVC filter retrieval device inserted. IVC filter removed. Unable to remove IVC filter. Access site: hemostasis. IVC tolerated well.   
Filter hook appears covered by the IVC

## 2019-06-26 NOTE — ANESTHESIA POSTPROCEDURE EVALUATION
Procedure(s):  REMOVE VENA CAVA FILTER. MAC    Anesthesia Post Evaluation      Multimodal analgesia: multimodal analgesia used between 6 hours prior to anesthesia start to PACU discharge  Patient location during evaluation: bedside  Patient participation: complete - patient participated  Level of consciousness: awake  Pain score: 0  Pain management: adequate  Airway patency: patent  Anesthetic complications: no  Cardiovascular status: stable  Respiratory status: acceptable  Hydration status: acceptable  Post anesthesia nausea and vomiting:  controlled      No vitals data found for the desired time range.

## 2019-06-26 NOTE — Clinical Note
Right neck prepped with ChloraPrep Wet prep solution applied at: 1255. Wet prep solution dried at: 1258. Wet prep elapsed drying time: 3 mins.

## 2019-06-26 NOTE — Clinical Note
TRANSFER - OUT REPORT:  
 
Verbal report given to: Marc Hunt. Report consisted of patient's Situation, Background, Assessment and  
Recommendations(SBAR). Opportunity for questions and clarification was provided. Patient transported with a Cardiac Cath Tech / Patient Care Tech. Patient transported to: [address]

## 2019-06-26 NOTE — Clinical Note
TRANSFER - IN REPORT:  
 
Verbal report received from: Emely Michael RN. Report consisted of patient's Situation, Background, Assessment and  
Recommendations(SBAR). Opportunity for questions and clarification was provided. Assessment completed upon patient's arrival to unit and care assumed. Patient transported with a Cardiac Cath Tech / Patient Care Tech.

## 2019-06-26 NOTE — PROGRESS NOTES
Pt admitted to Cath holding to [facility], [facility] 5. Pt placed on monitors. Reviewed pt hx, pt meds, pt allergies and assessment completed. PIVx 1 obtained see opassessment. Pt noted to be in sinus cardiac rhythm. Prepped chest for procedure. MD notified of need of consent. Family at bedside. No acute distress noted No c/o verbalized. 1053  Bedside preopTimeout completed with FLACO Casas made aware consents have yet to be signed    1110 Dr Britt Keith anethesiologist at bedside questions answered and consents signed    [phone] Dr Bella Barajas at bedside answered questions and consent signed    200 pt still awaiting procedure noted cardiac rhythm fluctuating from sinus to brent in low/mid 40's when pt resting with eyes closed    1242 assisted to bathroom to void    1245 bedside preop timeout and report given to [address] [place] [address] REPORT:    Verbal report given to [address] [place] [address] on [facility]  being transferred to cath lab for ordered procedure       Report consisted of patients Situation, Background, Assessment and   Recommendations(SBAR). Information from the following report(s) Pre Procedure Checklist was reviewed with the receiving nurse. Lines:   Peripheral IV 06/26/19 Right Antecubital (Active)        Opportunity for questions and clarification was provided. Patient transported with:   [facility] REPORT:    Verbal report received from JAIMEE Wu on [facility]  being received from cath lab for routine post - op      Report consisted of patients Situation, Background, Assessment and   Recommendations(SBAR). Information from the following report(s) Procedure Summary was reviewed with the receiving nurse. Opportunity for questions and clarification was provided. Assessment completed upon patients arrival to unit and care assumed. Placed on monitors. Dressing to right side of neck CDI pt arousable with verbal stimuli and opens eyes spontaneously. No c/o at this time no acute distress. Will cont to monitor.       56 Dr. Bella Barajas at bedside questions answered to pt's and family's satisfaction     1524 reviewed discharge instructions with pt and spouse all questions answered to pt's satisfaction pt tolerating po fluids and meal allowed to get dressed    1530 PIV removed no sign of phlebitis or infiltration at this time dressing to neck remains CDI without sign of bleeding or hematoma    1542 escorted pt to parking lot and daughter's vehicle without incident

## 2019-06-26 NOTE — OP NOTES
Preoperative diagnosis: History of pulmonary embolism    Postoperative diagnosis: Same    Procedures performed:  #1  Ultrasound of right internal jugular vein with interpretation  #2  Placement of catheter to inferior vena cava via IJ access. #3  Inferior venacavogram with interpretation  #4  Attempted IVC retrieval, non-retrieval, no complication    Cultures: None    Specimens: None    Drains: None    Estimated blood loss: Less than 50 mL    Assistants: None    Implants: Please see above    Complications: None    Anesthesia: Moderate conscious sedation per anesthesia    Indications for the procedure:  Troy Jose is a 58 y.o. total joint replacement with history of PE DVT. Had filter placed now is here for retrieval.  Has history of filter positive with thrombus. Patient was given the appropriate risk and benefits of the procedure including but not limited to bleeding, infection, damage to adjacent structures, MI, stroke, death, loss of lower extremity, need for further surgery. Patient was understanding of all the risks and underwent a procedure. Operative findings:   #1  Filter now appears free of thrombus. Filter tip well grown into the wall of the vein unable to retrieve based on no access to the retrieval hook. Procedure:  Patient was correctly identified in the precath area and taken to the Cath Lab in stable condition. Patient had pre-incision timeout prior to any incision. Patient was prepped and draped in the normal sterile fashion according to CDC guidelines aseptic technique. Right neck was ultrasound evaluated. The carotid is pulsatile the vein is compressible no DVT. Gained easy access placed a wire started image saved to the chart. Placed the sheath and a wire down into the inferior vena cava. Did a inferior venacavogram.  No thrombus is visualized possibly filter well grown into the wall. Brought to different types of snares.   Could not get passage over the hook and many angles many attempts. Try to straighten the filter with wire placement on each side. Eventually terminated the procedure except feel that the tip must be good enough into the vein not can get access over this for retrieval.  Treat the sheath and wire direct pressure was held for hemostasis. Transferred to recovery in stable condition the plan is to keep the filter.

## 2019-07-12 ENCOUNTER — OFFICE VISIT (OUTPATIENT)
Dept: VASCULAR SURGERY | Age: 64
End: 2019-07-12

## 2019-07-12 VITALS
WEIGHT: 215 LBS | DIASTOLIC BLOOD PRESSURE: 86 MMHG | SYSTOLIC BLOOD PRESSURE: 140 MMHG | BODY MASS INDEX: 31.84 KG/M2 | RESPIRATION RATE: 16 BRPM | HEIGHT: 69 IN | HEART RATE: 80 BPM

## 2019-07-12 DIAGNOSIS — Z95.828 PRESENCE OF IVC FILTER: ICD-10-CM

## 2019-07-12 DIAGNOSIS — Z86.718 PERSONAL HISTORY OF DVT (DEEP VEIN THROMBOSIS): Primary | ICD-10-CM

## 2019-07-12 DIAGNOSIS — Z79.01 ANTICOAGULATION ADEQUATE: ICD-10-CM

## 2019-07-12 DIAGNOSIS — Z86.711 HISTORY OF PULMONARY EMBOLISM: ICD-10-CM

## 2019-07-12 NOTE — PROGRESS NOTES
1. Have you been to an emergency room or urgent care clinic since your last visit? NO    Hospitalized since your last visit? If yes, where, when, and reason for visit? NO  2. Have you seen or consulted any other health care providers outside of the [facility] since your last visit including any procedures, health maintenance items. If yes, where, when and reason for visit?  NO

## 2019-07-12 NOTE — LETTER
7/12/19 Patient: Paulette Lynn YOB: 1955 Date of Visit: 7/12/2019 Sheri Can MD 
[address] [place] [address] VIA Facsimile: [phone] Dear Sheri Can MD, Thank you for referring Ms. Pranav Moya to [facility] for evaluation. My notes for this consultation are attached. If you have questions, please do not hesitate to call me. I look forward to following your patient along with you. Sincerely, Ranjana Vega MD

## 2019-07-12 NOTE — PROGRESS NOTES
Ms. Hawk Emanuel is returned today from her attempt of filter removal.  I found that the filter had grown well into the vein and was too high risk to try to retrieve. The thrombus and it did resolve. Is quite pleased to see that. She is also resumed her warfarin. Had a lengthy discussion with her today she feels that she is hypercoagulable with these multiple events. Probably she is at her best remaining on warfarin and keeping the filter. This gives her protection from thrombus and certainly from thromboembolism. Her case is quite dramatic and that the filter was so important for stopping this large thromboembolus. She is happy with her care at this point. I will see her back on as-needed basis.   She knows to return if she has any swelling of the

## 2020-01-21 ENCOUNTER — HOSPITAL ENCOUNTER (OUTPATIENT)
Dept: MAMMOGRAPHY | Age: 65
Discharge: HOME | End: 2020-01-21
Attending: FAMILY MEDICINE
Payer: COMMERCIAL

## 2020-01-21 DIAGNOSIS — Z12.31 BREAST CANCER SCREENING BY MAMMOGRAM: ICD-10-CM

## 2020-01-21 PROCEDURE — 77063 BREAST TOMOSYNTHESIS BI: CPT

## 2020-02-04 RX ORDER — WARFARIN 3 MG/1
TABLET ORAL
Qty: 60 TAB | Refills: 0 | Status: SHIPPED | OUTPATIENT
Start: 2020-02-04

## 2020-03-15 LAB
CHOLEST SERPL-MCNC: 185 MG/DL
COTININE SERPL-MCNC: ABNORMAL NG/ML
GLUCOSE SERPL-MCNC: 90 MG/DL (ref 74–99)
HDLC SERPL-MCNC: 68 MG/DL (ref 40–60)
LDLC SERPL CALC-MCNC: 100.6 MG/DL (ref 0–100)
NICOTINE SERPL-MCNC: ABNORMAL NG/ML
TRIGL SERPL-MCNC: 82 MG/DL (ref ?–150)

## 2020-03-30 ENCOUNTER — HOSPITAL ENCOUNTER (OUTPATIENT)
Dept: LAB | Age: 65
Discharge: HOME | End: 2020-03-30
Payer: COMMERCIAL

## 2020-03-30 LAB
25(OH)D3 SERPL-MCNC: 25.1 NG/ML (ref 30–100)
ALBUMIN SERPL-MCNC: 3.6 G/DL (ref 3.4–5)
ALBUMIN/GLOB SERPL: 1 {RATIO} (ref 0.8–1.7)
ALP SERPL-CCNC: 112 U/L (ref 45–117)
ALT SERPL-CCNC: 27 U/L (ref 13–56)
ANION GAP SERPL CALC-SCNC: 4 MMOL/L (ref 3–18)
AST SERPL-CCNC: 31 U/L (ref 10–38)
BASOPHILS # BLD: 0 K/UL (ref 0–0.1)
BASOPHILS NFR BLD: 0 % (ref 0–2)
BILIRUB SERPL-MCNC: 0.6 MG/DL (ref 0.2–1)
BUN SERPL-MCNC: 19 MG/DL (ref 7–18)
BUN/CREAT SERPL: 19 (ref 12–20)
CALCIUM SERPL-MCNC: 8.6 MG/DL (ref 8.5–10.1)
CHLORIDE SERPL-SCNC: 107 MMOL/L (ref 100–111)
CHOLEST SERPL-MCNC: 175 MG/DL
CO2 SERPL-SCNC: 30 MMOL/L (ref 21–32)
CREAT SERPL-MCNC: 1.01 MG/DL (ref 0.6–1.3)
DIFFERENTIAL METHOD BLD: ABNORMAL
EOSINOPHIL # BLD: 0.2 K/UL (ref 0–0.4)
EOSINOPHIL NFR BLD: 5 % (ref 0–5)
ERYTHROCYTE [DISTWIDTH] IN BLOOD BY AUTOMATED COUNT: 14.8 % (ref 11.6–14.5)
GLOBULIN SER CALC-MCNC: 3.6 G/DL (ref 2–4)
GLUCOSE SERPL-MCNC: 83 MG/DL (ref 74–99)
HCT VFR BLD AUTO: 38.4 % (ref 35–45)
HDLC SERPL-MCNC: 64 MG/DL (ref 40–60)
HDLC SERPL: 2.7 {RATIO} (ref 0–5)
HGB BLD-MCNC: 12.3 G/DL (ref 12–16)
LDLC SERPL CALC-MCNC: 97.2 MG/DL (ref 0–100)
LIPID PROFILE,FLP: ABNORMAL
LYMPHOCYTES # BLD: 1.9 K/UL (ref 0.9–3.6)
LYMPHOCYTES NFR BLD: 51 % (ref 21–52)
MCH RBC QN AUTO: 30.2 PG (ref 24–34)
MCHC RBC AUTO-ENTMCNC: 32 G/DL (ref 31–37)
MCV RBC AUTO: 94.3 FL (ref 74–97)
MONOCYTES # BLD: 0.3 K/UL (ref 0.05–1.2)
MONOCYTES NFR BLD: 9 % (ref 3–10)
NEUTS SEG # BLD: 1.3 K/UL (ref 1.8–8)
NEUTS SEG NFR BLD: 35 % (ref 40–73)
PLATELET # BLD AUTO: 244 K/UL (ref 135–420)
PMV BLD AUTO: 10.5 FL (ref 9.2–11.8)
POTASSIUM SERPL-SCNC: 3.8 MMOL/L (ref 3.5–5.5)
PROT SERPL-MCNC: 7.2 G/DL (ref 6.4–8.2)
RBC # BLD AUTO: 4.07 M/UL (ref 4.2–5.3)
SODIUM SERPL-SCNC: 141 MMOL/L (ref 136–145)
TRIGL SERPL-MCNC: 69 MG/DL (ref ?–150)
TSH SERPL DL<=0.05 MIU/L-ACNC: 2.33 UIU/ML (ref 0.36–3.74)
VLDLC SERPL CALC-MCNC: 13.8 MG/DL
WBC # BLD AUTO: 3.8 K/UL (ref 4.6–13.2)

## 2020-03-30 PROCEDURE — 85025 COMPLETE CBC W/AUTO DIFF WBC: CPT

## 2020-03-30 PROCEDURE — 80061 LIPID PANEL: CPT

## 2020-03-30 PROCEDURE — 36415 COLL VENOUS BLD VENIPUNCTURE: CPT

## 2020-03-30 PROCEDURE — 84443 ASSAY THYROID STIM HORMONE: CPT

## 2020-03-30 PROCEDURE — 80053 COMPREHEN METABOLIC PANEL: CPT

## 2020-03-30 PROCEDURE — 82306 VITAMIN D 25 HYDROXY: CPT

## 2020-10-02 ENCOUNTER — HOSPITAL ENCOUNTER (EMERGENCY)
Age: 65
Discharge: HOME | End: 2020-10-02
Attending: EMERGENCY MEDICINE | Admitting: EMERGENCY MEDICINE
Payer: MEDICARE

## 2020-10-02 ENCOUNTER — APPOINTMENT (OUTPATIENT)
Dept: GENERAL RADIOLOGY | Age: 65
End: 2020-10-02
Attending: EMERGENCY MEDICINE
Payer: MEDICARE

## 2020-10-02 VITALS
TEMPERATURE: 97.4 F | HEART RATE: 54 BPM | OXYGEN SATURATION: 91 % | RESPIRATION RATE: 16 BRPM | SYSTOLIC BLOOD PRESSURE: 130 MMHG | DIASTOLIC BLOOD PRESSURE: 19 MMHG

## 2020-10-02 DIAGNOSIS — R42 DIZZINESS: ICD-10-CM

## 2020-10-02 DIAGNOSIS — R06.09 DYSPNEA ON EXERTION: Primary | ICD-10-CM

## 2020-10-02 LAB
ALBUMIN SERPL-MCNC: 3.5 G/DL (ref 3.4–5)
ALBUMIN/GLOB SERPL: 0.7 {RATIO} (ref 0.8–1.7)
ALP SERPL-CCNC: 120 U/L (ref 45–117)
ALT SERPL-CCNC: 25 U/L (ref 13–56)
ANION GAP SERPL CALC-SCNC: 5 MMOL/L (ref 3–18)
AST SERPL-CCNC: 44 U/L (ref 10–38)
ATRIAL RATE: 67 BPM
BASOPHILS # BLD: 0 K/UL (ref 0–0.1)
BASOPHILS NFR BLD: 0 % (ref 0–2)
BILIRUB SERPL-MCNC: 0.6 MG/DL (ref 0.2–1)
BUN SERPL-MCNC: 13 MG/DL (ref 7–18)
BUN/CREAT SERPL: 11 (ref 12–20)
CALCIUM SERPL-MCNC: 9 MG/DL (ref 8.5–10.1)
CALCULATED P AXIS, ECG09: 22 DEGREES
CALCULATED R AXIS, ECG10: 20 DEGREES
CALCULATED T AXIS, ECG11: 22 DEGREES
CHLORIDE SERPL-SCNC: 108 MMOL/L (ref 100–111)
CK MB CFR SERPL CALC: ABNORMAL % (ref 0–4)
CK MB SERPL-MCNC: <1 NG/ML (ref 5–25)
CK SERPL-CCNC: 245 U/L (ref 26–192)
CO2 SERPL-SCNC: 26 MMOL/L (ref 21–32)
CREAT SERPL-MCNC: 1.14 MG/DL (ref 0.6–1.3)
D DIMER PPP FEU-MCNC: 0.39 UG/ML(FEU)
DIAGNOSIS, 93000: NORMAL
DIFFERENTIAL METHOD BLD: ABNORMAL
EOSINOPHIL # BLD: 0.5 K/UL (ref 0–0.4)
EOSINOPHIL NFR BLD: 9 % (ref 0–5)
ERYTHROCYTE [DISTWIDTH] IN BLOOD BY AUTOMATED COUNT: 14.4 % (ref 11.6–14.5)
GLOBULIN SER CALC-MCNC: 4.7 G/DL (ref 2–4)
GLUCOSE SERPL-MCNC: 99 MG/DL (ref 74–99)
HCT VFR BLD AUTO: 40.6 % (ref 35–45)
HGB BLD-MCNC: 13.2 G/DL (ref 12–16)
LYMPHOCYTES # BLD: 3.1 K/UL (ref 0.9–3.6)
LYMPHOCYTES NFR BLD: 53 % (ref 21–52)
MCH RBC QN AUTO: 30.4 PG (ref 24–34)
MCHC RBC AUTO-ENTMCNC: 32.5 G/DL (ref 31–37)
MCV RBC AUTO: 93.5 FL (ref 74–97)
MONOCYTES # BLD: 0.2 K/UL (ref 0.05–1.2)
MONOCYTES NFR BLD: 4 % (ref 3–10)
NEUTS SEG # BLD: 2 K/UL (ref 1.8–8)
NEUTS SEG NFR BLD: 34 % (ref 40–73)
P-R INTERVAL, ECG05: 80 MS
PLATELET # BLD AUTO: 228 K/UL (ref 135–420)
PMV BLD AUTO: 10.8 FL (ref 9.2–11.8)
POTASSIUM SERPL-SCNC: 5 MMOL/L (ref 3.5–5.5)
PROT SERPL-MCNC: 8.2 G/DL (ref 6.4–8.2)
Q-T INTERVAL, ECG07: 396 MS
QRS DURATION, ECG06: 88 MS
QTC CALCULATION (BEZET), ECG08: 418 MS
RBC # BLD AUTO: 4.34 M/UL (ref 4.2–5.3)
SODIUM SERPL-SCNC: 139 MMOL/L (ref 136–145)
TROPONIN I SERPL-MCNC: <0.02 NG/ML (ref 0–0.04)
VENTRICULAR RATE, ECG03: 67 BPM
WBC # BLD AUTO: 5.9 K/UL (ref 4.6–13.2)

## 2020-10-02 PROCEDURE — 85025 COMPLETE CBC W/AUTO DIFF WBC: CPT

## 2020-10-02 PROCEDURE — 99284 EMERGENCY DEPT VISIT MOD MDM: CPT

## 2020-10-02 PROCEDURE — 71045 X-RAY EXAM CHEST 1 VIEW: CPT

## 2020-10-02 PROCEDURE — 82550 ASSAY OF CK (CPK): CPT

## 2020-10-02 PROCEDURE — 80053 COMPREHEN METABOLIC PANEL: CPT

## 2020-10-02 PROCEDURE — 93005 ELECTROCARDIOGRAM TRACING: CPT

## 2020-10-02 PROCEDURE — 85379 FIBRIN DEGRADATION QUANT: CPT

## 2020-10-02 NOTE — ED PROVIDER NOTES
[employer]  [facility] EMERGENCY DEPT      2:16 PM    Date: 10/2/2020  Patient Name: Lenka Escalante    History of Presenting Illness     Chief Complaint   Patient presents with    Dizziness       76 y.o. female with noted past medical history who presents to the emergency department with shortness of breath and dizziness with exertion. The patient states that she has history of blood clots to include DVTs and PEs and she is currently on warfarin. She states is been taking it compliantly and also has her blood levels checked regularly. Patient states that about 3 to 4 days ago she developed a nonproductive cough and noticed that she is more short of breath than her usual.  She specifically has some dyspnea on exertion as well as some dizziness with significant exertion. There is no vertigo. Denies any fall or head trauma. She denies any headache. The patient denies recent travel outside [place] and denies recent travel to areas large social gatherings. The patient denies any known history of Covid 19 exposure. Patient denies any other associated signs or symptoms. Patient denies any other complaints. Nursing notes regarding the HPI and triage nursing notes were reviewed. Prior medical records were reviewed. Current Outpatient Medications   Medication Sig Dispense Refill    warfarin (COUMADIN) 3 mg tablet TAKE 1 TABLET BY MOUTH ONCE DAILY 60 Tab 0    HYDROcodone-acetaminophen (NORCO)  mg tablet Take 1-2 Tabs by mouth every six (6) hours as needed for Pain. Max Daily Amount: 8 Tabs. 56 Tab 0    ferrous sulfate 325 mg (65 mg iron) tablet Take 1 Tab by mouth two (2) times daily (with meals). 60 Tab 1    oxyCODONE-acetaminophen (PERCOCET) 7.5-325 mg per tablet Take 1-2 Tabs by mouth every six (6) hours as needed for Pain. Max Daily Amount: 8 Tabs. 56 Tab 0    pantoprazole (PROTONIX) 40 mg tablet Take 40 mg by mouth daily.       cyclobenzaprine HCl (FLEXERIL PO) Take 5 mg by mouth as needed.  mometasone-formoterol (DULERA) 100-5 mcg/actuation HFA inhaler Take 2 Puffs by inhalation two (2) times daily as needed for Other (breathing).  cetirizine (ZYRTEC) 10 mg tablet Take 10 mg by mouth daily. take 1 tab daily      guaiFENesin (MUCINEX) 1,200 mg TM12 ER tablet Take 1,200 mg by mouth as needed.  polyethylene glycol (MIRALAX) 17 gram packet Take 17 g by mouth daily.  albuterol (PROVENTIL, VENTOLIN) 90 mcg/actuation inhaler Take 1-2 Puffs by inhalation every four (4) hours as needed for Wheezing. 17 g 0    Inhalational Spacing Device (AEROCHAMBER) 1 Each by Does Not Apply route as needed. 1 Device 0    CALCIUM CITRATE/VITAMIN D3 (CALCITRATE-VITAMIN D PO) Take  by mouth.  lubiPROStone (AMITIZA) 24 mcg capsule Take 24 mcg by mouth.  acetaminophen (TYLENOL EXTRA STRENGTH) 500 mg tablet Take 500 mg by mouth every six (6) hours as needed for Pain.  atorvastatin (LIPITOR) 20 mg tablet Take 20 mg by mouth nightly.          Past History     Past Medical History:  Past Medical History:   Diagnosis Date    Arthritis     Breast cancer ([facility] [address]     Left    Cancer ([facility] [address] 2001    Lobular situ-Left breast    Chronic pain     DVT (deep venous thrombosis) (HCC)     GERD (gastroesophageal reflux disease)     Hx of radiation therapy     Hyperlipidemia     Hypertension     no meds    Low back pain potentially associated with radiculopathy     Menopause     Osteopenia     mild    Pulmonary embolism (HCC)     Radiation therapy complication     Left breast    S/P hip replacement     Right hip    Trochanteric bursitis of right hip     Wears glasses        Past Surgical History:  Past Surgical History:   Procedure Laterality Date    HX HIP REPLACEMENT  2/12/2003    Right hip    HX MASTECTOMY  2001    Left partial    HX OTHER SURGICAL Bilateral 2014    bilat styes on eyes    HX TUBAL LIGATION  1983       Family History:  Family History   Problem Relation Age of Onset    Asthma Other     Arthritis-osteo Other     Breast Cancer Maternal Aunt     Diabetes Mother     Heart Disease Mother     Alzheimer Mother     Diabetes Sister     Hypertension Sister     COPD Sister     Diabetes Brother     Hypertension Brother        Social History:  Social History     Tobacco Use    Smoking status: Former Smoker     Packs/day: 1.00     Years: 20.00     Pack years: 20.00     Types: Cigarettes    Smokeless tobacco: Never Used    Tobacco comment: QUIT APPROX 2000   Substance Use Topics    Alcohol use: No     Frequency: Never     Comment: HOLIDAYS     Drug use: No       Allergies:  No Known Allergies    Patient's primary care provider (as noted in EPIC):  Rosalio Knapp MD    Review of Systems   Constitutional: Negative for chills, diaphoresis and fever. HENT: Negative for congestion. Eyes: Negative for discharge. Respiratory: Positive for shortness of breath. Negative for wheezing and stridor. Cardiovascular: Negative for chest pain, palpitations and leg swelling. Gastrointestinal: Negative for diarrhea. Endocrine: Negative for heat intolerance. Genitourinary: Negative for flank pain. Musculoskeletal: Negative for back pain. Neurological: Negative for tremors, seizures, syncope, facial asymmetry, speech difficulty, weakness, light-headedness, numbness and headaches. Psychiatric/Behavioral: Negative for hallucinations. All other systems reviewed and are negative. Visit Vitals  BP (!) 130/19   Pulse (!) 54   Temp 97.4 °F (36.3 °C)   Resp 16   SpO2 91%       PHYSICAL EXAM:    CONSTITUTIONAL:  Alert, in no apparent distress;  well developed;  well nourished. HEAD:  Normocephalic, atraumatic. EYES:  EOMI. Non-icteric sclera. Normal conjunctiva. ENTM:  Nose:  no rhinorrhea. Throat:  no erythema or exudate, mucous membranes moist.  NECK:  No JVD.   Supple    RESPIRATORY:  Chest clear, equal breath sounds, good air movement. CARDIOVASCULAR:  Regular rate and rhythm. No murmurs, rubs, or gallops. GI:  Normal bowel sounds, abdomen soft and non-tender. No rebound or guarding. BACK:  Non-tender. UPPER EXT:  Normal inspection. LOWER EXT:  No edema, no calf tenderness. Distal pulses intact. NEURO:  Moves all four extremities, and grossly normal motor exam.  SKIN:  No rashes;  Normal for age. PSYCH:  Alert and normal affect. DIFFERENTIAL DIAGNOSES/ MEDICAL DECISION MAKING:   Shortness of breath etiologies include chronic obstructive pulmonary disease (COPD), acute asthma exacerbation, congestive heart failure, pneumonia, acute bronchitis, pulmonary embolism, upper respiratory infection, cardiac event to include acute coronary syndrome, acute myocardial infarction or a combination of the above (ex URI on top of COPD thus causing respiratory distress). Diagnostic Study Results     Abnormal lab results from this emergency department encounter:  Labs Reviewed   CBC WITH AUTOMATED DIFF - Abnormal; Notable for the following components:       Result Value    NEUTROPHILS 34 (*)     LYMPHOCYTES 53 (*)     EOSINOPHILS 9 (*)     ABS. EOSINOPHILS 0.5 (*)     All other components within normal limits   METABOLIC PANEL, COMPREHENSIVE - Abnormal; Notable for the following components:    BUN/Creatinine ratio 11 (*)     GFR est AA 58 (*)     GFR est non-AA 48 (*)     AST (SGOT) 44 (*)     Alk.  phosphatase 120 (*)     Globulin 4.7 (*)     A-G Ratio 0.7 (*)     All other components within normal limits   CARDIAC PANEL,(CK, CKMB & TROPONIN) - Abnormal; Notable for the following components:     (*)     All other components within normal limits   D DIMER       Lab values for this patient within approximately the last 12 hours:  Recent Results (from the past 12 hour(s))   CBC WITH AUTOMATED DIFF    Collection Time: 10/02/20  2:01 PM   Result Value Ref Range    WBC 5.9 4.6 - 13.2 K/uL    RBC 4.34 4.20 - 5.30 M/uL    HGB 13.2 12.0 - 16.0 g/dL    HCT 40.6 35.0 - 45.0 %    MCV 93.5 74.0 - 97.0 FL    MCH 30.4 24.0 - 34.0 PG    MCHC 32.5 31.0 - 37.0 g/dL    RDW 14.4 11.6 - 14.5 %    PLATELET 359 153 - 747 K/uL    MPV 10.8 9.2 - 11.8 FL    NEUTROPHILS 34 (L) 40 - 73 %    LYMPHOCYTES 53 (H) 21 - 52 %    MONOCYTES 4 3 - 10 %    EOSINOPHILS 9 (H) 0 - 5 %    BASOPHILS 0 0 - 2 %    ABS. NEUTROPHILS 2.0 1.8 - 8.0 K/UL    ABS. LYMPHOCYTES 3.1 0.9 - 3.6 K/UL    ABS. MONOCYTES 0.2 0.05 - 1.2 K/UL    ABS. EOSINOPHILS 0.5 (H) 0.0 - 0.4 K/UL    ABS. BASOPHILS 0.0 0.0 - 0.1 K/UL    DF AUTOMATED     METABOLIC PANEL, COMPREHENSIVE    Collection Time: 10/02/20  2:01 PM   Result Value Ref Range    Sodium 139 136 - 145 mmol/L    Potassium 5.0 3.5 - 5.5 mmol/L    Chloride 108 100 - 111 mmol/L    CO2 26 21 - 32 mmol/L    Anion gap 5 3.0 - 18 mmol/L    Glucose 99 74 - 99 mg/dL    BUN 13 7.0 - 18 MG/DL    Creatinine 1.14 0.6 - 1.3 MG/DL    BUN/Creatinine ratio 11 (L) 12 - 20      GFR est AA 58 (L) >60 ml/min/1.73m2    GFR est non-AA 48 (L) >60 ml/min/1.73m2    Calcium 9.0 8.5 - 10.1 MG/DL    Bilirubin, total 0.6 0.2 - 1.0 MG/DL    ALT (SGPT) 25 13 - 56 U/L    AST (SGOT) 44 (H) 10 - 38 U/L    Alk.  phosphatase 120 (H) 45 - 117 U/L    Protein, total 8.2 6.4 - 8.2 g/dL    Albumin 3.5 3.4 - 5.0 g/dL    Globulin 4.7 (H) 2.0 - 4.0 g/dL    A-G Ratio 0.7 (L) 0.8 - 1.7     CARDIAC PANEL,(CK, CKMB & TROPONIN)    Collection Time: 10/02/20  2:01 PM   Result Value Ref Range    CK - MB <1.0 <3.6 ng/ml    CK-MB Index  0.0 - 4.0 %     CALCULATION NOT PERFORMED WHEN RESULT IS BELOW LINEAR LIMIT     (H) 26 - 192 U/L    Troponin-I, QT <0.02 0.0 - 0.045 NG/ML   D DIMER    Collection Time: 10/02/20  2:01 PM   Result Value Ref Range    D DIMER 0.39 <0.46 ug/ml(FEU)   EKG, 12 LEAD, INITIAL    Collection Time: 10/02/20  2:07 PM   Result Value Ref Range    Ventricular Rate 67 BPM    Atrial Rate 67 BPM    P-R Interval 80 ms    QRS Duration 88 ms    Q-T Interval 396 ms    QTC Calculation (Bezet) 418 ms    Calculated P Axis 22 degrees    Calculated R Axis 20 degrees    Calculated T Axis 22 degrees    Diagnosis       Sinus rhythm with short HI  Nonspecific T wave abnormality  Abnormal ECG  When compared with ECG of 22-JAN-2019 13:01,  No significant change was found  Confirmed by James Martinez ([id number]) on 10/2/2020 3:34:11 PM         Radiologist and cardiologist interpretations if available at time of this note:  Xr Chest Port    Result Date: 10/2/2020  EXAM:  XR CHEST PORT INDICATION:   chest pain, sob, and/or arrhythmia COMPARISON: 1/22/2019. FINDINGS: The cardiac and mediastinal silhouette are within normal limits. Pulmonary vasculature is within normal limits. No pneumothorax or pleural effusions. No air space opacity. No acute osseous abnormality. Impression: No radiographic evidence of acute cardiopulmonary process. Medication(s) ordered for patient during this emergency visit encounter:  Medications - No data to display    Medical Decision Making     I am the first provider for this patient. I reviewed the vital signs, available nursing notes, past medical history, past surgical history, family history and social history. Vital Signs:  Reviewed the patient's vital signs. ED COURSE:        Patient's PERC screening cannot be assessed secondary to age and prior DVT and PE. IMPRESSION AND MEDICAL DECISION MAKING:  Based upon the patient's presentation with noted HPI and PE, along with the work up done in the emergency department, I believe that the patient is having dyspnea of uncertain etiology. However, given the work up done in the emergency department, I am comfortable with discharge of the patient and outpatient follow up with the patients primary care doctor. DIAGNOSIS:  1. Dyspnea. SPECIFIC PATIENT INSTRUCTIONS FROM THE PHYSICIAN WHO TREATED YOU IN THE ER TODAY:  1. Return if any concerns or worsening of condition(s)  2.  FOLLOW UP APPOINTMENT:  Your primary doctor in 1-2 days. Patient is improved, resting quietly and comfortably. The patient will be discharged home. The patient was reassured that these symptoms do not appear to represent a serious or life threatening condition at this time. Warning signs of worsening condition were discussed and understood by the patient. Based on patient's age, coexisting illness, exam, and the results of this ED evaluation, the decision to treat as an outpatient was made. Based on the information available at time of discharge, acute pathology requiring immediate intervention was deemed relative unlikely. While it is impossible to completely exclude the possibility of underlying serious disease or worsening of condition, I feel the relative likelihood is extremely low. I discussed this uncertainty with the patient, who understood ED evaluation and treatment and felt comfortable with the outpatient treatment plan. All questions regarding care, test results, and follow up were answered. The patient is stable and appropriate to discharge. They understand that they should return to the emergency department for any new or worsening symptoms. I stressed the importance of follow up for repeat assessment and possibly further evaluation/treatment. Dictation disclaimer:  Please note that this dictation was completed with [employer], the [employer] voice recognition software. Quite often unanticipated grammatical, syntax, homophones, and other interpretive errors are inadvertently transcribed by the computer software. Please disregard these errors. Please excuse any errors that have escaped final proofreading. Coding Diagnoses     Clinical Impression:   1. Dyspnea on exertion    2. Dizziness        Disposition     Disposition: Discharge. Emigdio Boucher M.D.   EVELYNE Board Certified Emergency Physician    Provider Attestation:  If a scribe was utilized in generation of this patient record, I personally performed the services described in the documentation, reviewed the documentation, as recorded by the scribe in my presence, and it accurately records the patient's history of presenting illness, review of systems, patient physical examination, and procedures performed by me as the attending physician. Khari Nicole M.D.   EVELYNE Board Certified Emergency Physician  10/2/2020.  2:16 PM

## 2020-10-02 NOTE — DISCHARGE INSTRUCTIONS
SPECIFIC PATIENT INSTRUCTIONS FROM THE PHYSICIAN WHO TREATED YOU IN THE ER TODAY:  1. Return if any concerns or worsening of condition(s)  2. FOLLOW UP APPOINTMENT:  Your primary doctor in 1-2 days. Patient Education        Shortness of Breath: Care Instructions  Your Care Instructions     Shortness of breath has many causes. Sometimes conditions such as anxiety can lead to shortness of breath. Some people get mild shortness of breath when they exercise. Trouble breathing also can be a symptom of a serious problem, such as asthma, lung disease, emphysema, heart problems, and pneumonia. If your shortness of breath continues, you may need tests and treatment. Watch for any changes in your breathing and other symptoms. Follow-up care is a key part of your treatment and safety. Be sure to make and go to all appointments, and call your doctor if you are having problems. It's also a good idea to know your test results and keep a list of the medicines you take. How can you care for yourself at home? · Do not smoke or allow others to smoke around you. If you need help quitting, talk to your doctor about stop-smoking programs and medicines. These can increase your chances of quitting for good. · Get plenty of rest and sleep. · Take your medicines exactly as prescribed. Call your doctor if you think you are having a problem with your medicine. · Find healthy ways to deal with stress. ? Exercise daily. ? Get plenty of sleep. ? Eat regularly and well. When should you call for help? Call 911 anytime you think you may need emergency care. For example, call if:    · You have severe shortness of breath.     · You have symptoms of a heart attack. These may include:  ? Chest pain or pressure, or a strange feeling in the chest.  ? Sweating. ? Shortness of breath. ? Nausea or vomiting. ? Pain, pressure, or a strange feeling in the back, neck, jaw, or upper belly or in one or both shoulders or arms.   ? Lightheadedness or sudden weakness. ? A fast or irregular heartbeat. After you call 911, the  may tell you to chew 1 adult-strength or 2 to 4 low-dose aspirin. Wait for an ambulance. Do not try to drive yourself. Call your doctor now or seek immediate medical care if:    · Your shortness of breath gets worse or you start to wheeze. Wheezing is a high-pitched sound when you breathe.     · You wake up at night out of breath or have to prop your head up on several pillows to breathe.     · You are short of breath after only light activity or while at rest.   Watch closely for changes in your health, and be sure to contact your doctor if:    · You do not get better over the next 1 to 2 days. Where can you learn more? Go to http://www.gray.com/  Enter S780 in the search box to learn more about \"Shortness of Breath: Care Instructions. \"  Current as of: February 24, 2020               Content Version: 12.6  © 2006-2020 [employer]. Care instructions adapted under license by [employer] (which disclaims liability or warranty for this information). If you have questions about a medical condition or this instruction, always ask your healthcare professional. [place] [address] any warranty or liability for your use of this information. Patient Education        Dizziness: Care Instructions  Your Care Instructions  Dizziness is the feeling of unsteadiness or fuzziness in your head. It is different than having vertigo, which is a feeling that the room is spinning or that you are moving or falling. It is also different from lightheadedness, which is the feeling that you are about to faint. It can be hard to know what causes dizziness. Some people feel dizzy when they have migraine headaches. Sometimes bouts of flu can make you feel dizzy. Some medical conditions, such as heart problems or high blood pressure, can make you feel dizzy.  Many medicines can cause dizziness, including medicines for high blood pressure, pain, or anxiety. If a medicine causes your symptoms, your doctor may recommend that you stop or change the medicine. If it is a problem with your heart, you may need medicine to help your heart work better. If there is no clear reason for your symptoms, your doctor may suggest watching and waiting for a while to see if the dizziness goes away on its own. Follow-up care is a key part of your treatment and safety. Be sure to make and go to all appointments, and call your doctor if you are having problems. It's also a good idea to know your test results and keep a list of the medicines you take. How can you care for yourself at home? · If your doctor recommends or prescribes medicine, take it exactly as directed. Call your doctor if you think you are having a problem with your medicine. · Do not drive while you feel dizzy. · Try to prevent falls. Steps you can take include:  ? Using nonskid mats, adding grab bars near the tub, and using night-lights. ? Clearing your home so that walkways are free of anything you might trip on.  ? Letting family and friends know that you have been feeling dizzy. This will help them know how to help you. When should you call for help? Call 911 anytime you think you may need emergency care. For example, call if:    · You passed out (lost consciousness).     · You have dizziness along with symptoms of a heart attack. These may include:  ? Chest pain or pressure, or a strange feeling in the chest.  ? Sweating. ? Shortness of breath. ? Nausea or vomiting. ? Pain, pressure, or a strange feeling in the back, neck, jaw, or upper belly or in one or both shoulders or arms. ? Lightheadedness or sudden weakness. ? A fast or irregular heartbeat.     · You have symptoms of a stroke.  These may include:  ? Sudden numbness, tingling, weakness, or loss of movement in your face, arm, or leg, especially on only one side of your body.  ? Sudden vision changes. ? Sudden trouble speaking. ? Sudden confusion or trouble understanding simple statements. ? Sudden problems with walking or balance. ? A sudden, severe headache that is different from past headaches. Call your doctor now or seek immediate medical care if:    · You feel dizzy and have a fever, headache, or ringing in your ears.     · You have new or increased nausea and vomiting.     · Your dizziness does not go away or comes back. Watch closely for changes in your health, and be sure to contact your doctor if:    · You do not get better as expected. Where can you learn more? Go to http://www.gray.com/  Enter Q823 in the search box to learn more about \"Dizziness: Care Instructions. \"  Current as of: June 26, 2019               Content Version: 12.6  © 4519-3624 [employer]. Care instructions adapted under license by [employer] (which disclaims liability or warranty for this information). If you have questions about a medical condition or this instruction, always ask your healthcare professional. [place] [address] any warranty or liability for your use of this information.

## 2020-10-02 NOTE — ED NOTES
Assumed care of patient from triage. Received patient sitting up on side of stretcher. Awake, alert, oriented x 4. Introduced myself as a member of her care team.  Explanation of plan of care provided to the patient, patient verbalized understanding. Assessment in progress.

## 2021-01-04 ENCOUNTER — TRANSCRIBE ORDER (OUTPATIENT)
Dept: SCHEDULING | Age: 66
End: 2021-01-04

## 2021-01-04 DIAGNOSIS — Z12.31 VISIT FOR SCREENING MAMMOGRAM: Primary | ICD-10-CM

## 2021-03-03 ENCOUNTER — TRANSCRIBE ORDER (OUTPATIENT)
Dept: SCHEDULING | Age: 66
End: 2021-03-03

## 2021-03-03 DIAGNOSIS — Z00.00 ROUTINE GENERAL MEDICAL EXAMINATION AT A HEALTH CARE FACILITY: ICD-10-CM

## 2021-03-03 DIAGNOSIS — Z13.6 SCREENING FOR ISCHEMIC HEART DISEASE: Primary | ICD-10-CM

## 2021-03-04 ENCOUNTER — TRANSCRIBE ORDER (OUTPATIENT)
Dept: SCHEDULING | Age: 66
End: 2021-03-04

## 2021-03-04 DIAGNOSIS — Z78.0 MENOPAUSE: Primary | ICD-10-CM

## 2021-03-09 ENCOUNTER — HOSPITAL ENCOUNTER (OUTPATIENT)
Dept: MAMMOGRAPHY | Age: 66
Discharge: HOME | End: 2021-03-09
Attending: PHYSICIAN ASSISTANT
Payer: MEDICARE

## 2021-03-09 ENCOUNTER — HOSPITAL ENCOUNTER (OUTPATIENT)
Dept: BONE DENSITY | Age: 66
Discharge: HOME | End: 2021-03-09
Attending: PHYSICIAN ASSISTANT
Payer: MEDICARE

## 2021-03-09 DIAGNOSIS — Z12.31 VISIT FOR SCREENING MAMMOGRAM: ICD-10-CM

## 2021-03-09 DIAGNOSIS — Z78.0 MENOPAUSE: ICD-10-CM

## 2021-03-09 PROCEDURE — 77067 SCR MAMMO BI INCL CAD: CPT

## 2021-03-09 PROCEDURE — 77080 DXA BONE DENSITY AXIAL: CPT

## 2021-03-11 ENCOUNTER — HOSPITAL ENCOUNTER (OUTPATIENT)
Dept: ULTRASOUND IMAGING | Age: 66
Discharge: HOME | End: 2021-03-11
Attending: PHYSICIAN ASSISTANT
Payer: MEDICARE

## 2021-03-11 DIAGNOSIS — Z13.6 SCREENING FOR ISCHEMIC HEART DISEASE: ICD-10-CM

## 2021-03-11 PROCEDURE — 76706 US ABDL AORTA SCREEN AAA: CPT

## 2021-03-17 ENCOUNTER — TRANSCRIBE ORDER (OUTPATIENT)
Dept: SCHEDULING | Age: 66
End: 2021-03-17

## 2021-03-17 DIAGNOSIS — R92.8 ABNORMAL MAMMOGRAM OF RIGHT BREAST: Primary | ICD-10-CM

## 2021-03-22 ENCOUNTER — HOSPITAL ENCOUNTER (OUTPATIENT)
Dept: LAB | Age: 66
Discharge: HOME | End: 2021-03-22
Payer: MEDICARE

## 2021-03-22 LAB
25(OH)D3 SERPL-MCNC: 46 NG/ML (ref 30–100)
ALBUMIN SERPL-MCNC: 3.4 G/DL (ref 3.4–5)
ALBUMIN/GLOB SERPL: 1 {RATIO} (ref 0.8–1.7)
ALP SERPL-CCNC: 106 U/L (ref 45–117)
ALT SERPL-CCNC: 22 U/L (ref 13–56)
ANION GAP SERPL CALC-SCNC: 2 MMOL/L (ref 3–18)
AST SERPL-CCNC: 24 U/L (ref 10–38)
BASOPHILS # BLD: 0 K/UL (ref 0–0.1)
BASOPHILS NFR BLD: 0 % (ref 0–2)
BILIRUB SERPL-MCNC: 0.6 MG/DL (ref 0.2–1)
BUN SERPL-MCNC: 13 MG/DL (ref 7–18)
BUN/CREAT SERPL: 14 (ref 12–20)
CALCIUM SERPL-MCNC: 8.6 MG/DL (ref 8.5–10.1)
CHLORIDE SERPL-SCNC: 110 MMOL/L (ref 100–111)
CHOLEST SERPL-MCNC: 182 MG/DL
CO2 SERPL-SCNC: 31 MMOL/L (ref 21–32)
CREAT SERPL-MCNC: 0.91 MG/DL (ref 0.6–1.3)
DIFFERENTIAL METHOD BLD: ABNORMAL
EOSINOPHIL # BLD: 0.3 K/UL (ref 0–0.4)
EOSINOPHIL NFR BLD: 8 % (ref 0–5)
ERYTHROCYTE [DISTWIDTH] IN BLOOD BY AUTOMATED COUNT: 14.4 % (ref 11.6–14.5)
GLOBULIN SER CALC-MCNC: 3.4 G/DL (ref 2–4)
GLUCOSE SERPL-MCNC: 86 MG/DL (ref 74–99)
HCT VFR BLD AUTO: 38.8 % (ref 35–45)
HDLC SERPL-MCNC: 58 MG/DL (ref 40–60)
HDLC SERPL: 3.1 {RATIO} (ref 0–5)
HGB BLD-MCNC: 12.5 G/DL (ref 12–16)
LDLC SERPL CALC-MCNC: 104.8 MG/DL (ref 0–100)
LIPID PROFILE,FLP: ABNORMAL
LYMPHOCYTES # BLD: 1.8 K/UL (ref 0.9–3.6)
LYMPHOCYTES NFR BLD: 47 % (ref 21–52)
MCH RBC QN AUTO: 30.4 PG (ref 24–34)
MCHC RBC AUTO-ENTMCNC: 32.2 G/DL (ref 31–37)
MCV RBC AUTO: 94.4 FL (ref 74–97)
MONOCYTES # BLD: 0.3 K/UL (ref 0.05–1.2)
MONOCYTES NFR BLD: 7 % (ref 3–10)
NEUTS SEG # BLD: 1.5 K/UL (ref 1.8–8)
NEUTS SEG NFR BLD: 38 % (ref 40–73)
PLATELET # BLD AUTO: 240 K/UL (ref 135–420)
PMV BLD AUTO: 10.9 FL (ref 9.2–11.8)
POTASSIUM SERPL-SCNC: 4.1 MMOL/L (ref 3.5–5.5)
PROT SERPL-MCNC: 6.8 G/DL (ref 6.4–8.2)
RBC # BLD AUTO: 4.11 M/UL (ref 4.2–5.3)
SODIUM SERPL-SCNC: 143 MMOL/L (ref 136–145)
TRIGL SERPL-MCNC: 96 MG/DL (ref ?–150)
VLDLC SERPL CALC-MCNC: 19.2 MG/DL
WBC # BLD AUTO: 3.9 K/UL (ref 4.6–13.2)

## 2021-03-22 PROCEDURE — 85025 COMPLETE CBC W/AUTO DIFF WBC: CPT

## 2021-03-22 PROCEDURE — 80061 LIPID PANEL: CPT

## 2021-03-22 PROCEDURE — 80053 COMPREHEN METABOLIC PANEL: CPT

## 2021-03-22 PROCEDURE — 82306 VITAMIN D 25 HYDROXY: CPT

## 2021-03-22 PROCEDURE — 36415 COLL VENOUS BLD VENIPUNCTURE: CPT

## 2021-04-06 ENCOUNTER — HOSPITAL ENCOUNTER (OUTPATIENT)
Dept: ULTRASOUND IMAGING | Age: 66
Discharge: HOME | End: 2021-04-06
Attending: PHYSICIAN ASSISTANT
Payer: MEDICARE

## 2021-04-06 ENCOUNTER — HOSPITAL ENCOUNTER (OUTPATIENT)
Dept: MAMMOGRAPHY | Age: 66
Discharge: HOME | End: 2021-04-06
Attending: PHYSICIAN ASSISTANT
Payer: MEDICARE

## 2021-04-06 DIAGNOSIS — R92.8 ABNORMAL MAMMOGRAM OF RIGHT BREAST: ICD-10-CM

## 2021-04-06 PROCEDURE — 76642 ULTRASOUND BREAST LIMITED: CPT

## 2021-04-06 PROCEDURE — 77061 BREAST TOMOSYNTHESIS UNI: CPT

## 2021-07-08 NOTE — Clinical Note
IVC consent obtained. Plan for Filter insertion. IVC site prepped. Right femoral vein accessed. IVC filter inserted. A Celect filter placed in Inferior Vena Cava. Access site: hemostasis. IVC tolerated well. 76 yo male with PMH CAD s/p CABG, PPM, CHF, HTN, HLD hx breast cancer s/p mastectomy (2001), umbilical hernia,  presents c/o fever and chills that started this afternoon. Pt reported some mild right upper abdominal cramping which resolved. States he had his usual morning, went to the store and "started to feel lousy". Denies any  CP, SOB, N/V/D, current abdominal pain, urinary complaints, HA or rashes. + chronic dry cough and chronic knee pain, denies any changes to baseline sx. +covid vaccine.    VITAL SIGNS: noted  CONSTITUTIONAL: Well-developed; well-nourished; in no acute distress  HEAD: Normocephalic; atraumatic  EYES: PERRL, EOM intact; conjunctiva and sclera clear  ENT: No nasal discharge; airway clear. MMM  NECK: Supple; non tender. No JVD noted  CARD: S1, S2 normal; no murmurs, gallops, or rubs. Regular rate and rhythm  RESP: CTAB/L, no wheezes, rales or rhonchi  ABD: Normal bowel sounds; soft; non-distended; mild RUQ tenderness, no lower abdominal tenderness, no rebound or guarding, + umbilical hernia, nontender, no skin changes, no CVA tenderness  EXT: Normal ROM. No calf tenderness or edema. Distal pulses intact  NEURO: Alert, oriented. Grossly unremarkable. No focal deficits  SKIN: Skin exam is warm and dry   MS: No midline spinal tenderness

## 2022-03-17 ENCOUNTER — TRANSCRIBE ORDER (OUTPATIENT)
Dept: SCHEDULING | Age: 67
End: 2022-03-17

## 2022-03-17 DIAGNOSIS — Z12.31 VISIT FOR SCREENING MAMMOGRAM: Primary | ICD-10-CM

## 2022-03-19 PROBLEM — E66.01 SEVERE OBESITY (BMI 35.0-39.9) WITH COMORBIDITY (HCC): Status: ACTIVE | Noted: 2018-04-30

## 2022-03-19 PROBLEM — M16.10 HIP ARTHRITIS: Status: ACTIVE | Noted: 2019-01-15

## 2022-03-19 PROBLEM — Z86.711 HISTORY OF PULMONARY EMBOLISM: Status: ACTIVE | Noted: 2019-01-15

## 2022-03-29 ENCOUNTER — HOSPITAL ENCOUNTER (OUTPATIENT)
Dept: MAMMOGRAPHY | Age: 67
Discharge: HOME | End: 2022-03-29
Attending: PHYSICIAN ASSISTANT
Payer: MEDICARE

## 2022-03-29 DIAGNOSIS — Z12.31 VISIT FOR SCREENING MAMMOGRAM: ICD-10-CM

## 2022-03-29 PROCEDURE — 77063 BREAST TOMOSYNTHESIS BI: CPT

## 2022-07-13 ENCOUNTER — HOSPITAL ENCOUNTER (OUTPATIENT)
Dept: GENERAL RADIOLOGY | Age: 67
Discharge: HOME | End: 2022-07-13
Payer: MEDICARE

## 2022-07-13 ENCOUNTER — TRANSCRIBE ORDER (OUTPATIENT)
Dept: REGISTRATION | Age: 67
End: 2022-07-13

## 2022-07-13 DIAGNOSIS — M25.561 RIGHT KNEE PAIN: Primary | ICD-10-CM

## 2022-07-13 DIAGNOSIS — M25.561 RIGHT KNEE PAIN: ICD-10-CM

## 2022-07-13 PROCEDURE — 73562 X-RAY EXAM OF KNEE 3: CPT

## 2022-08-18 ENCOUNTER — OFFICE VISIT (OUTPATIENT)
Dept: ORTHOPEDIC SURGERY | Age: 67
End: 2022-08-18
Payer: MEDICARE

## 2022-08-18 VITALS
HEART RATE: 60 BPM | BODY MASS INDEX: 34.53 KG/M2 | HEIGHT: 68 IN | TEMPERATURE: 96.7 F | OXYGEN SATURATION: 100 % | WEIGHT: 227.8 LBS

## 2022-08-18 DIAGNOSIS — M17.11 ARTHRITIS OF RIGHT KNEE: ICD-10-CM

## 2022-08-18 DIAGNOSIS — M25.561 RIGHT KNEE PAIN, UNSPECIFIED CHRONICITY: Primary | ICD-10-CM

## 2022-08-18 PROCEDURE — G8536 NO DOC ELDER MAL SCRN: HCPCS | Performed by: PHYSICIAN ASSISTANT

## 2022-08-18 PROCEDURE — G8399 PT W/DXA RESULTS DOCUMENT: HCPCS | Performed by: PHYSICIAN ASSISTANT

## 2022-08-18 PROCEDURE — 1090F PRES/ABSN URINE INCON ASSESS: CPT | Performed by: PHYSICIAN ASSISTANT

## 2022-08-18 PROCEDURE — G8417 CALC BMI ABV UP PARAM F/U: HCPCS | Performed by: PHYSICIAN ASSISTANT

## 2022-08-18 PROCEDURE — G9899 SCRN MAM PERF RSLTS DOC: HCPCS | Performed by: PHYSICIAN ASSISTANT

## 2022-08-18 PROCEDURE — 99214 OFFICE O/P EST MOD 30 MIN: CPT | Performed by: PHYSICIAN ASSISTANT

## 2022-08-18 PROCEDURE — G8432 DEP SCR NOT DOC, RNG: HCPCS | Performed by: PHYSICIAN ASSISTANT

## 2022-08-18 PROCEDURE — 1123F ACP DISCUSS/DSCN MKR DOCD: CPT | Performed by: PHYSICIAN ASSISTANT

## 2022-08-18 PROCEDURE — 1101F PT FALLS ASSESS-DOCD LE1/YR: CPT | Performed by: PHYSICIAN ASSISTANT

## 2022-08-18 PROCEDURE — 3017F COLORECTAL CA SCREEN DOC REV: CPT | Performed by: PHYSICIAN ASSISTANT

## 2022-08-18 PROCEDURE — G8427 DOCREV CUR MEDS BY ELIG CLIN: HCPCS | Performed by: PHYSICIAN ASSISTANT

## 2022-08-18 NOTE — PROGRESS NOTES
[address] [place] [address]  [phone]           Patient: Suki Juarez                MRN: [id number]       SSN: [id number]  YOB: 1955        AGE: 78 y.o. SEX: female  Body mass index is 34.64 kg/m². PCP: WILBERTO Nye  08/18/22      This office note has been dictated. REVIEW OF SYSTEMS:  Constitutional: Negative for fever, chills, weight loss and malaise/fatigue. HENT: Negative. Eyes: Negative. Respiratory: Negative. Cardiovascular: Negative. Gastrointestinal: No bowel incontinence or constipation. Genitourinary: No bladder incontinence or saddle anesthesia. Skin: Negative. Neurological: Negative. Endo/Heme/Allergies: Negative. Psychiatric/Behavioral: Negative. Musculoskeletal: As per HPI above. Past Medical History:   Diagnosis Date    Arthritis     Breast cancer ([facility] [address]     Left    Cancer ([facility] [address] 2001    Lobular situ-Left breast    Chronic pain     DVT (deep venous thrombosis) (HCC)     GERD (gastroesophageal reflux disease)     Hx of radiation therapy     Hyperlipidemia     Hypertension     no meds    Low back pain potentially associated with radiculopathy     Menopause     Osteopenia     mild    Pulmonary embolism (HCC)     Radiation therapy complication     Left breast    S/P hip replacement     Right hip    Trochanteric bursitis of right hip     Wears glasses          Current Outpatient Medications:     warfarin (COUMADIN) 3 mg tablet, TAKE 1 TABLET BY MOUTH ONCE DAILY, Disp: 60 Tab, Rfl: 0    HYDROcodone-acetaminophen (NORCO)  mg tablet, Take 1-2 Tabs by mouth every six (6) hours as needed for Pain. Max Daily Amount: 8 Tabs., Disp: 56 Tab, Rfl: 0    ferrous sulfate 325 mg (65 mg iron) tablet, Take 1 Tab by mouth two (2) times daily (with meals). , Disp: 60 Tab, Rfl: 1    oxyCODONE-acetaminophen (PERCOCET) 7.5-325 mg per tablet, Take 1-2 Tabs by mouth every six (6) hours as needed for Pain. Max Daily Amount: 8 Tabs., Disp: 56 Tab, Rfl: 0    pantoprazole (PROTONIX) 40 mg tablet, Take 40 mg by mouth daily. , Disp: , Rfl:     cyclobenzaprine HCl (FLEXERIL PO), Take 5 mg by mouth as needed. , Disp: , Rfl:     mometasone-formoterol (DULERA) 100-5 mcg/actuation HFA inhaler, Take 2 Puffs by inhalation two (2) times daily as needed for Other (breathing). , Disp: , Rfl:     cetirizine (ZYRTEC) 10 mg tablet, Take 10 mg by mouth daily. take 1 tab daily, Disp: , Rfl:     guaiFENesin (MUCINEX) 1,200 mg TM12 ER tablet, Take 1,200 mg by mouth as needed. , Disp: , Rfl:     polyethylene glycol (MIRALAX) 17 gram packet, Take 17 g by mouth daily. , Disp: , Rfl:     albuterol (PROVENTIL, VENTOLIN) 90 mcg/actuation inhaler, Take 1-2 Puffs by inhalation every four (4) hours as needed for Wheezing., Disp: 17 g, Rfl: 0    Inhalational Spacing Device (AEROCHAMBER), 1 Each by Does Not Apply route as needed. , Disp: 1 Device, Rfl: 0    CALCIUM CITRATE/VITAMIN D3 (CALCITRATE-VITAMIN D PO), Take  by mouth., Disp: , Rfl:     lubiPROStone (AMITIZA) 24 mcg capsule, Take 24 mcg by mouth.  , Disp: , Rfl:     acetaminophen (TYLENOL EXTRA STRENGTH) 500 mg tablet, Take 500 mg by mouth every six (6) hours as needed for Pain.  , Disp: , Rfl:     atorvastatin (LIPITOR) 20 mg tablet, Take 20 mg by mouth nightly., Disp: , Rfl:     No Known Allergies    Social History     Socioeconomic History    Marital status:      Spouse name: Not on file    Number of children: Not on file    Years of education: Not on file    Highest education level: Not on file   Occupational History    Not on file   Tobacco Use    Smoking status: Former     Packs/day: 1.00     Years: 20.00     Pack years: 20.00     Types: Cigarettes    Smokeless tobacco: Never    Tobacco comments:     QUIT APPROX 2000   Substance and Sexual Activity    Alcohol use: No     Comment: HOLIDAYS     Drug use: No    Sexual activity: Not on file   Other Topics Concern Not on file   Social History Narrative    Not on file     Social Determinants of Health     Financial Resource Strain: Not on file   Food Insecurity: Not on file   Transportation Needs: Not on file   Physical Activity: Not on file   Stress: Not on file   Social Connections: Not on file   Intimate Partner Violence: Not on file   Housing Stability: Not on file       Past Surgical History:   Procedure Laterality Date    HX HIP REPLACEMENT  2/12/2003    Right hip    HX MASTECTOMY  2001    Left partial    HX OTHER SURGICAL Bilateral 2014    bilat styes on eyes    [address]           Patient seen evaluated today for her right knee. She has been having discomfort in her knee over the past couple months. She denies any injury. She reports decreased walking tolerance. She has trouble in from a chair. She has trouble stairs. She has occasional night discomfort. There are no mechanical symptoms at this point. She is taken Tylenol for pain. She does take Coumadin and unable to take anti-inflammatories. Patient denies recent fevers, chills, chest pain, SOB, or injuries. No recent systemic changes noted. A 12-point review of systems is performed today. Pertinent positives are noted. All other systems reviewed and otherwise are negative. Physical exam: General: Alert and oriented x3, nad.  well-developed, well nourished. normal affect, AF. NC/AT, EOMI, neck supple, trachea midline, no JVD present. Breathing is non-labored. Examination of the lower extremities reveals pain-free range of motion the hips. There is no pain to palpation the trochanter bursa. Neck straight leg raise. Negative calf tenderness. Negative Homans. No signs of DVT present. The right knee reveals skin intact. There is no erythema or ecchymosis noted. There are no signs for infection or cellulitis present.   She does have discomfort to palpation to the medial lateral joint lines as well as patellofemoral grinding crepitus anteriorly with range of motion activities noted. Review of previous radiographs confirms moderate arthritis. Without acute abnormalities. Assessment: Right knee advanced arthritis    Plan: At this point, we discussed treatment options including injections, total knee replacements. The patient elects to continue with conservative treatment. She will continue on Tylenol as needed for pain. She will continue with ice therapy. Activities as tolerated. She will call with any questions or concerns that should arise.             JR Clemente SANDERS, PA-C, ATC

## 2022-10-14 ENCOUNTER — TRANSCRIBE ORDER (OUTPATIENT)
Dept: REGISTRATION | Age: 67
End: 2022-10-14

## 2022-10-14 ENCOUNTER — HOSPITAL ENCOUNTER (OUTPATIENT)
Dept: GENERAL RADIOLOGY | Age: 67
Discharge: HOME | End: 2022-10-14
Payer: MEDICARE

## 2022-10-14 DIAGNOSIS — M25.552 HIP PAIN, LEFT: ICD-10-CM

## 2022-10-14 DIAGNOSIS — M25.552 HIP PAIN, LEFT: Primary | ICD-10-CM

## 2022-10-14 PROCEDURE — 73502 X-RAY EXAM HIP UNI 2-3 VIEWS: CPT

## 2022-11-21 ENCOUNTER — OFFICE VISIT (OUTPATIENT)
Dept: ORTHOPEDIC SURGERY | Age: 67
End: 2022-11-21
Payer: MEDICARE

## 2022-11-21 VITALS — TEMPERATURE: 97.9 F | BODY MASS INDEX: 34.25 KG/M2 | HEIGHT: 68 IN | WEIGHT: 226 LBS

## 2022-11-21 DIAGNOSIS — M70.62 TROCHANTERIC BURSITIS, LEFT HIP: Primary | ICD-10-CM

## 2022-11-21 DIAGNOSIS — Z96.643 HISTORY OF BILATERAL HIP REPLACEMENTS: ICD-10-CM

## 2022-11-21 DIAGNOSIS — M25.552 LEFT HIP PAIN: ICD-10-CM

## 2022-11-21 DIAGNOSIS — M25.551 RIGHT HIP PAIN: ICD-10-CM

## 2022-11-21 PROCEDURE — G9899 SCRN MAM PERF RSLTS DOC: HCPCS | Performed by: PHYSICIAN ASSISTANT

## 2022-11-21 PROCEDURE — 99213 OFFICE O/P EST LOW 20 MIN: CPT | Performed by: PHYSICIAN ASSISTANT

## 2022-11-21 PROCEDURE — G8432 DEP SCR NOT DOC, RNG: HCPCS | Performed by: PHYSICIAN ASSISTANT

## 2022-11-21 PROCEDURE — G8417 CALC BMI ABV UP PARAM F/U: HCPCS | Performed by: PHYSICIAN ASSISTANT

## 2022-11-21 PROCEDURE — 1123F ACP DISCUSS/DSCN MKR DOCD: CPT | Performed by: PHYSICIAN ASSISTANT

## 2022-11-21 PROCEDURE — 3017F COLORECTAL CA SCREEN DOC REV: CPT | Performed by: PHYSICIAN ASSISTANT

## 2022-11-21 PROCEDURE — G8399 PT W/DXA RESULTS DOCUMENT: HCPCS | Performed by: PHYSICIAN ASSISTANT

## 2022-11-21 PROCEDURE — G8427 DOCREV CUR MEDS BY ELIG CLIN: HCPCS | Performed by: PHYSICIAN ASSISTANT

## 2022-11-21 PROCEDURE — G8536 NO DOC ELDER MAL SCRN: HCPCS | Performed by: PHYSICIAN ASSISTANT

## 2022-11-21 PROCEDURE — 20611 DRAIN/INJ JOINT/BURSA W/US: CPT | Performed by: PHYSICIAN ASSISTANT

## 2022-11-21 PROCEDURE — 73523 X-RAY EXAM HIPS BI 5/> VIEWS: CPT | Performed by: PHYSICIAN ASSISTANT

## 2022-11-21 PROCEDURE — 1090F PRES/ABSN URINE INCON ASSESS: CPT | Performed by: PHYSICIAN ASSISTANT

## 2022-11-21 PROCEDURE — 1101F PT FALLS ASSESS-DOCD LE1/YR: CPT | Performed by: PHYSICIAN ASSISTANT

## 2022-11-21 RX ORDER — BETAMETHASONE SODIUM PHOSPHATE AND BETAMETHASONE ACETATE 3; 3 MG/ML; MG/ML
6 INJECTION, SUSPENSION INTRA-ARTICULAR; INTRALESIONAL; INTRAMUSCULAR; SOFT TISSUE ONCE
Status: COMPLETED | OUTPATIENT
Start: 2022-11-21 | End: 2022-11-21

## 2022-11-21 RX ADMIN — BETAMETHASONE SODIUM PHOSPHATE AND BETAMETHASONE ACETATE 6 MG: 3; 3 INJECTION, SUSPENSION INTRA-ARTICULAR; INTRALESIONAL; INTRAMUSCULAR; SOFT TISSUE at 10:45

## 2022-11-21 NOTE — PROGRESS NOTES
[address] [place] [address]  [phone]           Patient: Kimberly Willis                MRN: [id number]       SSN: [id number]  YOB: 1955        AGE: 75 y.o. SEX: female  Body mass index is 34.36 kg/m². PCP: WILBERTO Deleon  11/21/22            REVIEW OF SYSTEMS:  Constitutional: Negative for fever, chills, weight loss and malaise/fatigue. HENT: Negative. Eyes: Negative. Respiratory: Negative. Cardiovascular: Negative. Gastrointestinal: No bowel incontinence or constipation. Genitourinary: No bladder incontinence or saddle anesthesia. Skin: Negative. Neurological: Negative. Endo/Heme/Allergies: Negative. Psychiatric/Behavioral: Negative. Musculoskeletal: As per HPI above. Past Medical History:   Diagnosis Date    Arthritis     Breast cancer ([facility] [address]     Left    Cancer ([facility] [address] 2001    Lobular situ-Left breast    Chronic pain     DVT (deep venous thrombosis) (HCC)     GERD (gastroesophageal reflux disease)     Hx of radiation therapy     Hyperlipidemia     Hypertension     no meds    Low back pain potentially associated with radiculopathy     Menopause     Osteopenia     mild    Pulmonary embolism (HCC)     Radiation therapy complication     Left breast    S/P hip replacement     Right hip    Trochanteric bursitis of right hip     Wears glasses          Current Outpatient Medications:     warfarin (COUMADIN) 3 mg tablet, TAKE 1 TABLET BY MOUTH ONCE DAILY, Disp: 60 Tab, Rfl: 0    HYDROcodone-acetaminophen (NORCO)  mg tablet, Take 1-2 Tabs by mouth every six (6) hours as needed for Pain. Max Daily Amount: 8 Tabs., Disp: 56 Tab, Rfl: 0    ferrous sulfate 325 mg (65 mg iron) tablet, Take 1 Tab by mouth two (2) times daily (with meals). , Disp: 60 Tab, Rfl: 1    oxyCODONE-acetaminophen (PERCOCET) 7.5-325 mg per tablet, Take 1-2 Tabs by mouth every six (6) hours as needed for Pain.  Max Daily Amount: 8 Tabs., Disp: 56 Tab, Rfl: 0    pantoprazole (PROTONIX) 40 mg tablet, Take 40 mg by mouth daily. , Disp: , Rfl:     cyclobenzaprine HCl (FLEXERIL PO), Take 5 mg by mouth as needed. , Disp: , Rfl:     mometasone-formoterol (DULERA) 100-5 mcg/actuation HFA inhaler, Take 2 Puffs by inhalation two (2) times daily as needed for Other (breathing). , Disp: , Rfl:     cetirizine (ZYRTEC) 10 mg tablet, Take 10 mg by mouth daily. take 1 tab daily, Disp: , Rfl:     guaiFENesin (MUCINEX) 1,200 mg TM12 ER tablet, Take 1,200 mg by mouth as needed. , Disp: , Rfl:     polyethylene glycol (MIRALAX) 17 gram packet, Take 17 g by mouth daily. , Disp: , Rfl:     albuterol (PROVENTIL, VENTOLIN) 90 mcg/actuation inhaler, Take 1-2 Puffs by inhalation every four (4) hours as needed for Wheezing., Disp: 17 g, Rfl: 0    Inhalational Spacing Device (AEROCHAMBER), 1 Each by Does Not Apply route as needed. , Disp: 1 Device, Rfl: 0    CALCIUM CITRATE/VITAMIN D3 (CALCITRATE-VITAMIN D PO), Take  by mouth., Disp: , Rfl:     lubiPROStone (AMITIZA) 24 mcg capsule, Take 24 mcg by mouth.  , Disp: , Rfl:     acetaminophen (TYLENOL EXTRA STRENGTH) 500 mg tablet, Take 500 mg by mouth every six (6) hours as needed for Pain.  , Disp: , Rfl:     atorvastatin (LIPITOR) 20 mg tablet, Take 20 mg by mouth nightly., Disp: , Rfl:     No Known Allergies    Social History     Socioeconomic History    Marital status:      Spouse name: Not on file    Number of children: Not on file    Years of education: Not on file    Highest education level: Not on file   Occupational History    Not on file   Tobacco Use    Smoking status: Former     Packs/day: 1.00     Years: 20.00     Pack years: 20.00     Types: Cigarettes    Smokeless tobacco: Never    Tobacco comments:     QUIT APPROX 2000   Substance and Sexual Activity    Alcohol use: No     Comment: HOLIDAYS     Drug use: No    Sexual activity: Not on file   Other Topics Concern    Not on file   Social History Narrative    Not on file     Social Determinants of Health     Financial Resource Strain: Not on file   Food Insecurity: Not on file   Transportation Needs: Not on file   Physical Activity: Not on file   Stress: Not on file   Social Connections: Not on file   Intimate Partner Violence: Not on file   Housing Stability: Not on file       Past Surgical History:   Procedure Laterality Date    HX HIP REPLACEMENT  2/12/2003    Right hip    HX MASTECTOMY  2001    Left partial    HX OTHER SURGICAL Bilateral 2014    bilat shay on eyes    [address]       Patient seen evaluated today for her hips. She is status post bilateral hip replacements. She has done very well with them. She is beginning to have some lateral based hip discomfort on the left side. She denies any radiating pain down the lower extremity. Denies any groin pain or thigh pain. She has no pain in the right hip. She denies any start of pain. There are no feelings of instability. She had no injuries to report. Patient denies recent fevers, chills, chest pain, SOB, or injuries. No recent systemic changes noted. A 12-point review of systems is performed today. Pertinent positives are noted. All other systems reviewed and otherwise are negative. Physical exam: General: Alert and oriented x3, nad.  well-developed, well nourished. normal affect, AF. NC/AT, EOMI, neck supple, trachea midline, no JVD present. Breathing is non-labored. Examination of the lower extremities reveals pain-free range of motion of the hips. She does have some tenderness palpation the trochanter bursa on the left side. Negative straight leg raise. Negative calf tenderness. Negative Homans. No signs of DVT present. Leg lengths are perfect. Abduction strength is symmetric.     Radiographs obtained in office today 11/21/2022 at the [facility] location include AP pelvis, AP and crosstable lateral of each of the hips shows the total hip components to be well fixed without evidence for loosening or fracture noted. She does have some mild remodeling noted to the medial cortex on the right hip. Assessment: #1 status post bilateral hip replacements, #2 left hip trochanter bursitis    Plan: At this point, we discussed treatment options. We will move forth a cortisone injection for the left hip, trochanter bursa. After informed consent, under aseptic conditions, with US guided assitance, the left hip was prepped with betadine and a mxiture of 3ml 1% lidocaine and 6mg of celestone was injected without complications. The patient tolerated the injection well. The patient is instructed on post-injection care. We will get her set up for outpatient physical therapy for IT band stretching, iontophoresis, total protocol as well as to work on her balance and coordination. We will see her back in a 2 month's time for evaluation to  the efficacy of the injection. May consider physical therapy at that point. She will call with any questions or concerns that should arise. Chart reviewed for the following:  Mika RUSSO PA-C, have reviewed the History, Physical and updated the Allergic reactions for [place]?     TIME OUT performed immediately prior to start of procedure:  Mika RUSSO PA-C, have performed the following reviews on [place] prior to the start of the procedure:  ????????  * Patient was identified by name and date of birth   * Agreement on procedure being performed was verified  * Risks and Benefits explained to the patient  * Procedure site verified and marked as necessary  * Patient was positioned for comfort  * Consent was signed and verified    Time:10:41 AM    Body part: left hip, intra-bursal    Medication & Dose: 3ml 1% lidocaine and 6mg celestone    Date of procedure: 11/21/22    Procedure performed by: Mika Mullins PA-C    Provider assisted by: none    Patient assisted by: self    How tolerated by patient: tolerated the procedure well with no complications    Post Procedural Pain Scale: 6    Comments:   [address] using a frequency of 10MHz with a 12L-RS transducer head was used to confirm needle placement.   Ultrasound images captured using [place] Ultrasound machine and scanned into patient's chart       Tish Mccabe PA-C, ATC

## 2022-11-22 ENCOUNTER — TELEPHONE (OUTPATIENT)
Dept: PHYSICAL THERAPY | Age: 67
End: 2022-11-22

## 2022-12-01 ENCOUNTER — HOSPITAL ENCOUNTER (OUTPATIENT)
Dept: PHYSICAL THERAPY | Age: 67
Discharge: HOME | End: 2022-12-01
Payer: MEDICARE

## 2022-12-01 PROCEDURE — 97162 PT EVAL MOD COMPLEX 30 MIN: CPT

## 2022-12-01 PROCEDURE — 97110 THERAPEUTIC EXERCISES: CPT

## 2022-12-02 NOTE — PROGRESS NOTES
[facility] - [place] [address]  [address], [address] [phone] fax    Plan of Care/ Statement of Necessity for Physical Therapy Services           Patient name: Cleve Fischer Start of Care: 2022   Referral source: Annette Kennedy : 1955    Medical Diagnosis: Pain in left hip [M25.552]  Trochanteric bursitis, left hip [M70.62]  Pain in right hip [M25.551]  Payor: Sigrid Drake / Plan: [address] / Product Type: [employer] Medicare /  Onset Date:~2022    Treatment Diagnosis: Left lateral hip and thigh pain   Prior Hospitalization: see medical history Provider#: [id number]   Medications: Verified on Patient summary List    Comorbidities: Arthritis, Breast CA, Hyperlipidemia, HTN, Osteopenia, DVT, PE, LBP/radiculopathy, GERD; bilat THRs   Prior Level of Function: ambulation with SPC, Right LE for stairs, prefers sidelying for sleep, independent ADL and light household chores, driving and community ambulation without restriction due to left hip pain    The Plan of Care and following information is based on the information from the initial evaluation. Assessment/ key information: Examination finds pain and tenderness at left lateral hip and thigh and gluteal muscles, decreased hip strength on left more so than right and pain with resistive testing, decreased left hip PROM with pain; gait deviation and pain left hip in weight bearing and possible leg length discrepancy, difficulty/pain with arising from sitting and with mat/bed mobility. Evaluation Complexity History HIGH Complexity :3+ comorbidities / personal factors will impact the outcome/ POC ; Examination MEDIUM Complexity : 3 Standardized tests and measures addressing body structure, function, activity limitation and / or participation in recreation  ;Presentation MEDIUM Complexity : Evolving with changing characteristics  ; Clinical Decision Making MEDIUM Complexity : FOTO score of 26-74  Overall Complexity Rating: MEDIUM  Problem List: pain affecting function, decrease ROM, decrease strength, impaired gait/ balance, decrease ADL/ functional abilitiies, decrease activity tolerance, decrease flexibility/ joint mobility, and decrease transfer abilities   Treatment Plan may include any combination of the following: Therapeutic exercise, Neuromuscular reeducation, Manual therapy, Therapeutic activity, Self care/home management, Electric stim unattended , Gait training, and Ultrasound  Patient / Family readiness to learn indicated by: asking questions, trying to perform skills, and interest  Persons(s) to be included in education: patient (P)  Barriers to Learning/Limitations: None  Patient Goal (s): able to move my left side better and walk without thinking I am falling  Patient Self Reported Health Status: good  Rehabilitation Potential: good  Short Term Goals: To be accomplished in 4 weeks:  Pt independent and compliant with beginning HEP and self-care routing. Currently no HEP. Decrease max pain scale rating by >/= 2 points. Current = 6/10. Improve FOTO by >/= 2-3 points. Current = 55. Increase left hip PROM for Flex, aBd and ER by >/= 10 degrees and mimimal discomfort. Current = 90 deg flex, 30 deg abd, and 50 deg ER, all with pain. Long Term Goals: To be accomplished in 8 weeks:  Improve FOTO by >/= 5 points. Current = 55. Increase left LE MMT scores by >/= 1/3 to 2/3 grades and minimal discomfort. Current = 3+ Abd, 4 Add, 4, IR, and 4+ ER and discomfort. Ambulation in home safely with SPC and no/minimal lateral lurching. Decrease max pain scale rating by >/= 4-5 point. Current = 6/10. Frequency / Duration: Patient to be seen 2-3 times per week for 6-8 weeks.     Patient/ Caregiver education and instruction: Diagnosis, prognosis, self care, activity modification, and exercises   [x]  Plan of care has been reviewed with PTA    Certification Period: 12/01/2022 to 12/30/2022    [place] Akins, KEESHA 12/2/2022 7:48 AM    ________________________________________________________________________    I certify that the above Therapy Services are being furnished while the patient is under my care. I agree with the treatment plan and certify that this therapy is necessary.     [de-identified] Signature:____________Date:_________TIME:________     Tanya Knutson PA-C  ** Signature, Date and Time must be completed for valid certification **    Please sign and return to [facility] - [address]   [facility], [address] [phone] fax

## 2022-12-02 NOTE — PROGRESS NOTES
PT DAILY TREATMENT NOTE     Patient Name: Melinda Oleary  Date:2022  : 1955  [x]  Patient  Verified  Payor: [employer] MEDICARE COMPLETE / Plan: [address] / Product Type: Managed Care Medicare /    In time:2:25  Out time:2:59  Total Treatment Time (min): 34  Visit #: 1 of 10    Medicare/BCBS Only   Total Timed Codes (min):  8 1:1 Treatment Time:  8       Treatment Area: Pain in left hip [M25.552]  Trochanteric bursitis, left hip [M70.62]  Pain in right hip [M25.551]    SUBJECTIVE  Pain Level (0-10 scale): 6  Any medication changes, allergies to medications, adverse drug reactions, diagnosis change, or new procedure performed?: [x] No    [] Yes (see summary sheet for update)  Subjective functional status/changes:   [] No changes reported  See Eval/POC. OBJECTIVE    Modality rationale: decrease edema, decrease inflammation, decrease pain, and increase tissue extensibility to improve the patients ability to transfer/transition, stand and walk for ADLs, chores, and community mobility, and sleep with less pain.    Min Type Additional Details    [] Estim:  []Unatt       []IFC  []Premod                        []Other:  []w/ice   []w/heat  Position:  Location:    [] Estim: []Att    []TENS instruct  []NMES                    []Other:  []w/US   []w/ice   []w/heat  Position:  Location:    []  Traction: [] Cervical       []Lumbar                       [] Prone          []Supine                       []Intermittent   []Continuous Lbs:  [] before manual  [] after manual    []  Ultrasound: []Continuous   [] Pulsed                           []1MHz   []3MHz W/cm2:  Location:    []  Iontophoresis with dexamethasone         Location: [] Take home patch   [] In clinic    []  Ice     []  heat  []  Ice massage  []  Laser   []  Anodyne Position:  Location:    []  Laser with stim  []  Other:  Position:  Location:    []  Vasopneumatic Device    []  Right     []  Left  Pre-treatment girth:  Post-treatment girth:  Measured at (location):  Pressure:       [] lo [] med [] hi   Temperature: [] lo [] med [] hi   [] Skin assessment post-treatment:  []intact []redness- no adverse reaction    []redness - adverse reaction:     26 min [x]Eval                  []Re-Eval       8 min Therapeutic Exercise:  [x] See flow sheet :   Rationale: increase ROM to improve the patients ability to transfer/transition, stand and walk for ADLs, chores, and community mobility, and sleep with less pain. 0 min Therapeutic Activity:  [x]  See flow sheet :   Rationale: increase ROM, increase strength, improve coordination, improve balance, and increase proprioception  to improve the patients ability to transfer/transition, stand and walk for ADLs, chores, and community mobility, and sleep with less pain. 0 min Neuromuscular Re-education:  []  See flow sheet :   Rationale: increase strength, improve coordination, improve balance, and increase proprioception  to improve the patients ability to transfer/transition, stand and walk for ADLs, chores, and community mobility, and sleep with less pain. 0 min Manual Therapy:  NA   The manual therapy interventions were performed at a separate and distinct time from the therapeutic activities interventions. Rationale: decrease pain, increase ROM, increase tissue extensibility, decrease edema , decrease trigger points, and increase postural awareness to transfer/transition, stand and walk for ADLs, chores, and community mobility, and sleep with less pain. 0 min Gait Training:  ___ feet with ___ device on level surfaces with ___ level of assist   Rationale:    0 min Self Care/Home Management: NA   Rationale: increase ROM, increase strength, improve coordination, improve balance, and increase proprioception  to improve the patients ability to transfer/transition, stand and walk for ADLs, chores, and community mobility, and sleep with less pain.           With   [x] TE [x] TA   [x] neuro   [] other: Patient Education: [x] Review HEP    [x] Progressed/Changed HEP based on:   [x] positioning   [x] body mechanics   [] transfers   [] heat/ice application    [] other:      Other Objective/Functional Measures: See Eval/POC. Pain Level (0-10 scale) post treatment: 5/10    ASSESSMENT/Changes in Function: See Eval/POC. Patient will continue to benefit from skilled PT services to modify and progress therapeutic interventions, address functional mobility deficits, address ROM deficits, address strength deficits, analyze and address soft tissue restrictions, analyze and cue movement patterns, analyze and modify body mechanics/ergonomics, assess and modify postural abnormalities, and instruct in home and community integration to attain remaining goals. [x]  See Plan of Care  []  See progress note/recertification  []  See Discharge Summary         Progress towards goals / Updated goals:  See Eval/POC.     PLAN  []  Upgrade activities as tolerated     []  Continue plan of care  []  Update interventions per flow sheet       []  Discharge due to:_  []  Other:_      Geovany Garnett, PT 12/2/2022  7:48 AM    Future Appointments   Date Time Provider Anastacia Shoemaker   12/9/2022  8:15 AM [facility] [address] [facility]   12/12/2022  3:00 PM Sunshine Tellez, PT [facility]   12/15/2022  3:00 PM Nolon April, PT [facility]   12/20/2022 12:00 PM [facility] PT [address] [facility]   12/22/2022  3:00 PM Randall Hayden, PTA [facility]   12/27/2022  2:15 PM [facility] PT [address] [facility]   12/29/2022  3:00 PM Nolon April, PT [facility]   ;ly

## 2022-12-09 ENCOUNTER — HOSPITAL ENCOUNTER (OUTPATIENT)
Dept: PHYSICAL THERAPY | Age: 67
Discharge: HOME | End: 2022-12-09
Payer: MEDICARE

## 2022-12-09 PROCEDURE — 97530 THERAPEUTIC ACTIVITIES: CPT

## 2022-12-09 PROCEDURE — 97112 NEUROMUSCULAR REEDUCATION: CPT

## 2022-12-09 PROCEDURE — 97110 THERAPEUTIC EXERCISES: CPT

## 2022-12-09 NOTE — PROGRESS NOTES
PT DAILY TREATMENT NOTE     Patient Name: Storm Bush  Date:2022  : 1955  [x]  Patient  Verified  Payor: Harrison Born / Plan: [facility] MEDICARE COMPLETE / Product Type: Managed Care Medicare /    In time: 8:15  Out time: 9:09  Total Treatment Time (min): 54 min. Visit #: 2 of 10         Medicare/BCBS Only   Total Timed Codes (min):  54 1:1 Treatment Time:  47         Treatment Area: Pain in left hip [M25.552]  Trochanteric bursitis, left hip [M70.62]  Pain in right hip [M25.551]     SUBJECTIVE  Pain Level (0-10 scale): 7/10  Any medication changes, allergies to medications, adverse drug reactions, diagnosis change, or new procedure performed?: [x] No    [] Yes (see summary sheet for update)  Subjective functional status/changes:   [] No changes reported  No problems with or questions about HEP. OBJECTIVE            Modality rationale: decrease edema, decrease inflammation, decrease pain, and increase tissue extensibility to improve the patients ability to transfer/transition, stand and walk for ADLs, chores, and community mobility, and sleep with less pain.     Min Type Additional Details     [] Estim:  []Unatt       []IFC  []Premod                        []Other:  []w/ice   []w/heat  Position:  Location:     [] Estim: []Att    []TENS instruct  []NMES                    []Other:  []w/US   []w/ice   []w/heat  Position:  Location:     []  Traction: [] Cervical       []Lumbar                       [] Prone          []Supine                       []Intermittent   []Continuous Lbs:  [] before manual  [] after manual     []  Ultrasound: []Continuous   [] Pulsed                           []1MHz   []3MHz W/cm2:  Location:     []  Iontophoresis with dexamethasone         Location: [] Take home patch   [] In clinic     []  Ice     []  heat  []  Ice massage  []  Laser   []  Anodyne Position:  Location:     []  Laser with stim  []  Other:  Position:  Location:     []  Vasopneumatic Device    []  Right     []  Left  Pre-treatment girth:  Post-treatment girth:  Measured at (location):  Pressure:       [] lo [] med [] hi   Temperature: [] lo [] med [] hi   [] Skin assessment post-treatment:  []intact []redness- no adverse reaction    []redness - adverse reaction:      26 min [x]Eval                  []Re-Eval         34 min Therapeutic Exercise:  [x] See flow sheet :   Rationale: increase ROM to improve the patients ability to transfer/transition, stand and walk for ADLs, chores, and community mobility, and sleep with less pain. 10 min Therapeutic Activity:  [x]  See flow sheet :   Rationale: increase ROM, increase strength, improve coordination, improve balance, and increase proprioception  to improve the patients ability to transfer/transition, stand and walk for ADLs, chores, and community mobility, and sleep with less pain. 10 min Neuromuscular Re-education:  []  See flow sheet :   Rationale: increase strength, improve coordination, improve balance, and increase proprioception  to improve the patients ability to transfer/transition, stand and walk for ADLs, chores, and community mobility, and sleep with less pain. 0 min Manual Therapy:  NA   The manual therapy interventions were performed at a separate and distinct time from the therapeutic activities interventions. Rationale: decrease pain, increase ROM, increase tissue extensibility, decrease edema , decrease trigger points, and increase postural awareness to transfer/transition, stand and walk for ADLs, chores, and community mobility, and sleep with less pain.      0 min Gait Training:  ___ feet with ___ device on level surfaces with ___ level of assist   Rationale:     0 min Self Care/Home Management: NA   Rationale: increase ROM, increase strength, improve coordination, improve balance, and increase proprioception  to improve the patients ability to transfer/transition, stand and walk for ADLs, chores, and community mobility, and sleep with less pain. With   [x] TE   [x] TA   [x] neuro   [] other: Patient Education: [x] Review HEP    [x] Progressed/Changed HEP based on:   [x] positioning   [x] body mechanics   [] transfers   [] heat/ice application    [] other:       Other Objective/Functional Measures: Fatigued with exercise and c/o LEs \"heavy\" nd note increased effort to change LE positioning on plinthe for TE. Pain Level (0-10 scale) post treatment:  6/10     ASSESSMENT/Changes in Function: Slightly decreaed pain post-Tx. Patient to apply heat at home post-Tx. Likely progress HEP at next visit and more standing exercises. Patient will continue to benefit from skilled PT services to modify and progress therapeutic interventions, address functional mobility deficits, address ROM deficits, address strength deficits, analyze and address soft tissue restrictions, analyze and cue movement patterns, analyze and modify body mechanics/ergonomics, assess and modify postural abnormalities, and instruct in home and community integration to attain remaining goals. [x]  See Plan of Care  []  See progress note/recertification  []  See Discharge Summary         Progress towards goals / Updated goals:  Short Term Goals: To be accomplished in 4 weeks:  Pt independent and compliant with beginning HEP and self-care routing. Eval:  No HEP. Compliant and without problems beginning HEP. Decrease max pain scale rating by >/= 2 points. Eval:  6/10.  7/10 pre-Trx and 6/10 posat-Tx  Improve FOTO by >/= 2-3 points. Eval:  55.  Increase left hip PROM for Flex, aBd and ER by >/= 10 degrees and mimimal discomfort. Eval:  90 deg flex, 30 deg abd, and 50 deg ER, all with pain. Not Reassessed  Long Term Goals: To be accomplished in 8 weeks:  Improve FOTO by >/= 5 points.     Eval:  54.  Reassess at PN  Increase left LE MMT scores by >/= 1/3 to 2/3 grades and minimal discomfort. Eval:  3+ Abd, 4 Add, 4, IR, and 4+ ER and discomfort. Not Reassessed. Ambulation in home safely with SPC and no/minimal lateral lurching. Eval:  slowed becky, decreased left stance time/antalgic, decreased bilat step/stride lengths, lateral lurching --maybe left LE longer versus right  Decrease max pain scale rating by >/= 4-5 point.     Eval:  6/10.  7/10 pre-Trx and 6/10 posat-Tx    PLAN  [x]  Upgrade activities as tolerated     [x]  Continue plan of care  []  Update interventions per flow sheet       []  Discharge due to:_  []  Other:_      Renée Alvarado PT 12/9/2022  8:08 AM    Future Appointments   Date Time Provider Anastacia Shoemaker   12/9/2022  8:15 AM [facility] [address] [facility]   12/12/2022  3:00 PM [facility]   12/15/2022  3:00 PM [facility]   12/20/2022 12:00 PM [facility]   12/22/2022  3:00 PM Christy Castillo, PTA [facility]   12/27/2022  2:15 PM [facility] PT [address] [facility]   12/29/2022  3:00 PM [facility]

## 2022-12-12 ENCOUNTER — HOSPITAL ENCOUNTER (OUTPATIENT)
Dept: PHYSICAL THERAPY | Age: 67
Discharge: HOME | End: 2022-12-12
Payer: MEDICARE

## 2022-12-12 PROCEDURE — 97112 NEUROMUSCULAR REEDUCATION: CPT

## 2022-12-12 PROCEDURE — 97110 THERAPEUTIC EXERCISES: CPT

## 2022-12-12 PROCEDURE — 97530 THERAPEUTIC ACTIVITIES: CPT

## 2022-12-12 NOTE — PROGRESS NOTES
PT DAILY TREATMENT NOTE     Patient Name: Silvia Mccollum  Date:2022  : 1955  [x]  Patient  Verified  Payor: [employer] MEDICARE COMPLETE / Plan: [address] / Product Type: Managed Care Medicare /    In time:257  Out time:340  Total Treatment Time (min): 43  Visit #: 3 of 10    Medicare/BCBS Only   Total Timed Codes (min):  43 1:1 Treatment Time:  43       Treatment Area: Pain in left hip [M25.552]  Trochanteric bursitis, left hip [M70.62]  Pain in right hip [M25.551]    SUBJECTIVE  Pain Level (0-10 scale): 6  Any medication changes, allergies to medications, adverse drug reactions, diagnosis change, or new procedure performed?: [x] No    [] Yes (see summary sheet for update)  Subjective functional status/changes:   [] No changes reported  \"Both hips are hurting. \"    OBJECTIVE    Modality rationale: Patient declined   Min Type Additional Details    [] Estim:  []Unatt       []IFC  []Premod                        []Other:  []w/ice   []w/heat  Position:  Location:    [] Estim: []Att    []TENS instruct  []NMES                    []Other:  []w/US   []w/ice   []w/heat  Position:  Location:    []  Traction: [] Cervical       []Lumbar                       [] Prone          []Supine                       []Intermittent   []Continuous Lbs:  [] before manual  [] after manual    []  Ultrasound: []Continuous   [] Pulsed                           []1MHz   []3MHz W/cm2:  Location:    []  Iontophoresis with dexamethasone         Location: [] Take home patch   [] In clinic    []  Ice     []  heat  []  Ice massage  []  Laser   []  Anodyne Position:  Location:    []  Laser with stim  []  Other:  Position:  Location:    []  Vasopneumatic Device    []  Right     []  Left  Pre-treatment girth:  Post-treatment girth:  Measured at (location):  Pressure:       [] lo [] med [] hi   Temperature: [] lo [] med [] hi   [] Skin assessment post-treatment:  []intact []redness- no adverse reaction    []redness - adverse reaction:     10 min Therapeutic Exercise:  [x] See flow sheet :   Rationale: increase ROM and increase strength to improve the patients ability to perform ADLs     13 min Therapeutic Activity:  [x]  See flow sheet : functional standing activities, squatting mechanics   Rationale: increase ROM, increase strength, improve coordination, improve balance, and increase proprioception  to improve the patients ability to improve mobility and ADL performance     20 min Neuromuscular Re-education:  [x]  See flow sheet : hip/glut re-ed activities    Rationale: increase ROM, increase strength, improve coordination, improve balance, and increase proprioception  to improve the patients ability to improve mobility, stance stability, and gait          With   [x] TE   [x] TA   [x] neuro   [] other: Patient Education: [x] Review HEP    [] Progressed/Changed HEP based on:   [x] positioning   [x] body mechanics   [] transfers   [] heat/ice application    [] other:      Other Objective/Functional Measures:      Pain Level (0-10 scale) post treatment: 4    ASSESSMENT/Changes in Function: Pt reports completing HEP daily. She needed multiple cues to improve body mechanics with standing exercises. Pt continues to ambulate with a Trendelenburg gait. No change thus far with strength. Pain continues to fluctuate. Patient will continue to benefit from skilled PT services to modify and progress therapeutic interventions, address functional mobility deficits, address ROM deficits, address strength deficits, analyze and address soft tissue restrictions, analyze and cue movement patterns, analyze and modify body mechanics/ergonomics, assess and modify postural abnormalities, address imbalance/dizziness, and instruct in home and community integration to attain remaining goals.      [x]  See Plan of Care  []  See progress note/recertification  []  See Discharge Summary         Progress towards goals / Updated goals:  Short Term Goals: To be accomplished in 4 weeks:  Pt independent and compliant with beginning HEP and self-care routing. Eval:  No HEP. Reports compliance  Decrease max pain scale rating by >/= 2 points. Eval:  6/10. Pain fluctuates currently  Improve FOTO by >/= 2-3 points. Eval:  55. Assess at 30 day elder  Increase left hip PROM for Flex, aBd and ER by >/= 10 degrees and mimimal discomfort. Eval:  90 deg flex, 30 deg abd, and 50 deg ER, all with pain. Measure NV  Long Term Goals: To be accomplished in 8 weeks:  Improve FOTO by >/= 5 points. Eval:  55. Assess at 30 day elder  Increase left LE MMT scores by >/= 1/3 to 2/3 grades and minimal discomfort. Eval:  3+ Abd, 4 Add, 4, IR, and 4+ ER and discomfort. No change to note thus far  Ambulation in home safely with [facility] and no/minimal lateral lurching. Eval:  slowed becky, decreased left stance time/antalgic, decreased bilat step/stride lengths, lateral lurching --maybe left LE longer versus right   Continues to ambulate with Trendelenburg gait  Decrease max pain scale rating by >/= 4-5 point. Eval:  6/10.   Pain fluctuates currently     PLAN  []  Upgrade activities as tolerated     [x]  Continue plan of care  []  Update interventions per flow sheet       []  Discharge due to:_  []  Other:_      Dimple Aparicio PTA, CSCS 12/12/2022  3:49 PM    Future Appointments   Date Time Provider Anastacia Shoemaker   12/15/2022  3:00 [facility]   12/20/2022 12:00 PM [facility] [address] [facility]   12/22/2022  3:00 PM [facility]   12/27/2022  2:15 PM [facility] [address] [facility]   12/29/2022  3:00 PM Calli Garza [facility]

## 2022-12-15 ENCOUNTER — APPOINTMENT (OUTPATIENT)
Dept: PHYSICAL THERAPY | Age: 67
End: 2022-12-15
Payer: MEDICARE

## 2022-12-20 ENCOUNTER — HOSPITAL ENCOUNTER (OUTPATIENT)
Dept: PHYSICAL THERAPY | Age: 67
Discharge: HOME | End: 2022-12-20
Payer: MEDICARE

## 2022-12-20 PROCEDURE — 97110 THERAPEUTIC EXERCISES: CPT

## 2022-12-20 PROCEDURE — 97112 NEUROMUSCULAR REEDUCATION: CPT

## 2022-12-20 NOTE — PROGRESS NOTES
PT DAILY TREATMENT NOTE     Patient Name: Estrella Dee  Date:  2022  : 1955  [x]  Patient  Verified  Payor: [employer] Plan / Plan: [facility] MEDICARE COMPLETE / Product Type: Managed Care Medicare /    In time:  12:09  PM  Out time:  12:58 PM  Total Treatment Time (min):  49 min. Visit #: 4 of 10         Medicare/BCBS Only   Total Timed Codes (min):  49 1:1 Treatment Time:  49 min. Treatment Area: Pain in left hip [M25.552]  Trochanteric bursitis, left hip [M70.62]  Pain in right hip [M25.551]     SUBJECTIVE  Pain Level (0-10 scale):  3/10  Any medication changes, allergies to medications, adverse drug reactions, diagnosis change, or new procedure performed?: [x] No    [] Yes (see summary sheet for update)  Subjective functional status/changes:   [] No changes reported  Pt c/o bilat hip discomfort at lateral hips/proximal thighs.     OBJECTIVE            Modality rationale: Patient declined    Min Type Additional Details     [] Estim:  []Unatt       []IFC  []Premod                        []Other:  []w/ice   []w/heat  Position:  Location:     [] Estim: []Att    []TENS instruct  []NMES                    []Other:  []w/US   []w/ice   []w/heat  Position:  Location:     []  Traction: [] Cervical       []Lumbar                       [] Prone          []Supine                       []Intermittent   []Continuous Lbs:  [] before manual  [] after manual     []  Ultrasound: []Continuous   [] Pulsed                           []1MHz   []3MHz W/cm2:  Location:     []  Iontophoresis with dexamethasone         Location: [] Take home patch   [] In clinic     []  Ice     []  heat  []  Ice massage  []  Laser   []  Anodyne Position:  Location:     []  Laser with stim  []  Other:  Position:  Location:     []  Vasopneumatic Device    []  Right     []  Left  Pre-treatment girth:  Post-treatment girth:  Measured at (location):  Pressure:       [] lo [] med [] hi   Temperature: [] lo [] med [] hi [] Skin assessment post-treatment:  []intact []redness- no adverse reaction    []redness - adverse reaction:      37 min Therapeutic Exercise:  [x] See flow sheet :   Rationale: increase ROM and increase strength to improve the patients ability to perform ADLs      0 min Therapeutic Activity:  []  See flow sheet :    Rationale: increase ROM, increase strength, improve coordination, improve balance, and increase proprioception  to improve the patients ability to improve mobility and ADL performance     12 min Neuromuscular Re-education:  [x]  See flow sheet :  core and hip stabilization. Rationale: increase ROM, increase strength, improve coordination, improve balance, and increase proprioception  to improve the patients ability to improve mobility, stance stability, and gait                                                      With   [x] TE   [x] TA   [x] neuro   [] other: Patient Education: [x] Review HEP    [] Progressed/Changed HEP based on:   [x] positioning   [x] body mechanics   [] transfers   [] heat/ice application    [] other:       Other Objective/Functional Measures:      Improved bridging and T-band resisted hip aBd/ER. Pain Level (0-10 scale) post treatment:  2/10     ASSESSMENT/Changes in Function:  Better performance of stretches, but difficulty/discomfort with hip/LE stretches. Patient will continue to benefit from skilled PT services to modify and progress therapeutic interventions, address functional mobility deficits, address ROM deficits, address strength deficits, analyze and address soft tissue restrictions, analyze and cue movement patterns, analyze and modify body mechanics/ergonomics, assess and modify postural abnormalities, address imbalance/dizziness, and instruct in home and community integration to attain remaining goals.      [x]  See Plan of Care  []  See progress note/recertification  []  See Discharge Summary         Progress towards goals / Updated goals:  Short Term Goals: To be accomplished in 4 weeks:  Pt independent and compliant with beginning HEP and self-care routing. Eval:  No HEP. Reports compliance  Decrease max pain scale rating by >/= 2 points. Eval:  6/10. Less pain today at 3/10  Improve FOTO by >/= 2-3 points. Eval:  55. Assess at 30 day elder  Increase left hip PROM for Flex, aBd and ER by >/= 10 degrees and mimimal discomfort. Eval:  90 deg flex, 30 deg abd, and 50 deg ER, all with pain. [facility] with single KTC, increasing CHENG, but still tight hip adductors with stretching. Long Term Goals: To be accomplished in 8 weeks:  Improve FOTO by >/= 5 points. Eval:  55. Assess at 30 day elder  Increase left LE MMT scores by >/= 1/3 to 2/3 grades and minimal discomfort. Eval:  3+ Abd, 4 Add, 4, IR, and 4+ ER and discomfort. No change to note thus far, but improved bridging and tolerance for T-band resisted hip aBd/ER in hooklying. Ambulation in home safely with SPC and no/minimal lateral lurching. Eval:  slowed becky, decreased left stance time/antalgic, decreased bilat step/stride lengths, lateral lurching --maybe left LE longer versus right              Continues to ambulate with Trendelenburg gait  Decrease max pain scale rating by >/= 4-5 point. Eval:  6/10.   Less pain today at 3/10     PLAN  [x]  Upgrade activities as tolerated     [x]  Continue plan of care  []  Update interventions per flow sheet       []  Discharge due to:_  []  Other:_      Gael Montanez, PT 12/20/2022  9:03 AM    Future Appointments   Date Time Provider Anastacia Shoemaker   12/20/2022 12:00 PM [facility] PT [address] [facility]   12/22/2022  3:00 PM Real Wise, PTA [facility]   12/27/2022  2:15 PM [facility] PT [address] [facility]   12/29/2022  3:00 PM Angel Chicas PT [facility]

## 2022-12-22 ENCOUNTER — HOSPITAL ENCOUNTER (OUTPATIENT)
Dept: PHYSICAL THERAPY | Age: 67
Discharge: HOME | End: 2022-12-22
Payer: MEDICARE

## 2022-12-22 PROCEDURE — 97530 THERAPEUTIC ACTIVITIES: CPT

## 2022-12-22 PROCEDURE — 97110 THERAPEUTIC EXERCISES: CPT

## 2022-12-22 PROCEDURE — 97112 NEUROMUSCULAR REEDUCATION: CPT

## 2022-12-22 NOTE — PROGRESS NOTES
PT DAILY TREATMENT NOTE     Patient Name: Manuel Salvador  Date:2022  : 1955  [x]  Patient  Verified  Payor: [employer] MEDICARE COMPLETE / Plan: [address] / Product Type: Managed Care Medicare /    In time:1200  Out time:1245  Total Treatment Time (min): 45  Visit #: 5 of 10    Medicare/BCBS Only   Total Timed Codes (min):  45 1:1 Treatment Time:  45       Treatment Area: Pain in left hip [M25.552]  Trochanteric bursitis, left hip [M70.62]  Pain in right hip [M25.551]    SUBJECTIVE  Pain Level (0-10 scale): 0  Any medication changes, allergies to medications, adverse drug reactions, diagnosis change, or new procedure performed?: [x] No    [] Yes (see summary sheet for update)  Subjective functional status/changes:   [] No changes reported  Patient states, \"It's coming along, that's all I can tell you. \"    OBJECTIVE    15 min Therapeutic Exercise:  [x] See flow sheet :   Rationale: increase ROM and increase strength to improve the patients ability to increase activity tolerance    20 min Therapeutic Activity:  [x]  See flow sheet : squatting mechanics, standing functional hip strength, STS   Rationale: increase ROM, increase strength, and improve coordination  to improve the patients ability to perform heavy ADLs     10 min Neuromuscular Re-education:  [x]  See flow sheet : glut re-ed   Rationale: increase strength, improve coordination, improve balance, and increase proprioception  to improve the patients ability to tolerate sustained postures          With   [] TE   [] TA   [] neuro   [] other: Patient Education: [x] Review HEP    [] Progressed/Changed HEP based on:   [] positioning   [] body mechanics   [] transfers   [] heat/ice application    [] other:      Other Objective/Functional Measures: added exercises per flow sheet to improve hip mobility     Pain Level (0-10 scale) post treatment: 0    ASSESSMENT/Changes in Function: Patient reports no pain upon arrival and tolerated progression of exercises without increase of pain. Reports some residual thigh soreness from last session and was mildly challenged with eccentric control with STS. Performs squats with good mechanics. Noted quad lag with left SLR. Patient will continue to benefit from skilled PT services to modify and progress therapeutic interventions, address functional mobility deficits, address ROM deficits, address strength deficits, analyze and address soft tissue restrictions, analyze and cue movement patterns, analyze and modify body mechanics/ergonomics, assess and modify postural abnormalities, address imbalance/dizziness, and instruct in home and community integration to attain remaining goals. []  See Plan of Care  []  See progress note/recertification  []  See Discharge Summary         Progress towards goals / Updated goals:  Short Term Goals: To be accomplished in 4 weeks:  Pt independent and compliant with beginning HEP and self-care routing. Eval:  No HEP. Reports compliance  Decrease max pain scale rating by >/= 2 points. Eval:  6/10. Less pain today at 3/10  Improve FOTO by >/= 2-3 points. Eval:  55. Assess at 30 day elder  Increase left hip PROM for Flex, aBd and ER by >/= 10 degrees and mimimal discomfort. Eval:  90 deg flex, 30 deg abd, and 50 deg ER, all with pain. Hip flexion nearly [facility] bilat with single KTC, increasing CHENG, but still tight hip adductors with stretching. Long Term Goals: To be accomplished in 8 weeks:  Improve FOTO by >/= 5 points. Eval:  55. Assess at 30 day elder  Increase left LE MMT scores by >/= 1/3 to 2/3 grades and minimal discomfort. Eval:  3+ Abd, 4 Add, 4, IR, and 4+ ER and discomfort. No change to note thus far, but improved bridging and tolerance for T-band resisted hip aBd/ER in hooklying. Ambulation in home safely with SPC and no/minimal lateral lurching.               Eval:  slowed becky, decreased left stance time/antalgic, decreased bilat step/stride lengths, lateral lurching --maybe left LE longer versus right              Continues to ambulate with Trendelenburg gait  Decrease max pain scale rating by >/= 4-5 point. Eval:  6/10.   Less pain today at 3/10    PLAN  [x]  Upgrade activities as tolerated     [x]  Continue plan of care  []  Update interventions per flow sheet       []  Discharge due to:_  []  Other:_      Erin So, PTA 12/22/2022  12:15 PM    Future Appointments   Date Time Provider Anastacia Shoemaker   12/27/2022  2:15 PM [facility] [address] [facility]   12/29/2022  3:00 PM Rita Willis PT [facility]

## 2022-12-27 ENCOUNTER — HOSPITAL ENCOUNTER (OUTPATIENT)
Dept: PHYSICAL THERAPY | Age: 67
Discharge: HOME | End: 2022-12-27
Payer: MEDICARE

## 2022-12-27 PROCEDURE — 97110 THERAPEUTIC EXERCISES: CPT

## 2022-12-27 PROCEDURE — 97530 THERAPEUTIC ACTIVITIES: CPT

## 2022-12-27 PROCEDURE — 97112 NEUROMUSCULAR REEDUCATION: CPT

## 2022-12-27 NOTE — PROGRESS NOTES
PT DAILY TREATMENT NOTE     Patient Name: Mirta Rea  Date:2022  : 1955  [x]  Patient  Verified  Payor: [employer] / Plan: [facility] MEDICARE COMPLETE / Product Type: Managed Care Medicare /    In time: 2:15 PM  Out time:  3:00 PM  Total Treatment Time (min):  45 min. Visit #: 6 of 10         Medicare/BCBS Only   Total Timed Codes (min):  45 1:1 Treatment Time:  39         Treatment Area: Pain in left hip [M25.552]  Trochanteric bursitis, left hip [M70.62]  Pain in right hip [M25.551]     SUBJECTIVE  Pain Level (0-10 scale):  4/10  Any medication changes, allergies to medications, adverse drug reactions, diagnosis change, or new procedure performed?: [x] No    [] Yes (see summary sheet for update)  Subjective functional status/changes:   [] No changes reported  Pt c/o left lateral hip pain today. OBJECTIVE     15 min Therapeutic Exercise:  [x] See flow sheet :   Rationale: increase ROM and increase strength to improve the patients ability to increase activity tolerance     15 min Therapeutic Activity:  [x]  See flow sheet : squatting mechanics, standing functional hip strength, STS   Rationale: increase ROM, increase strength, and improve coordination  to improve the patients ability to perform heavy ADLs     15 min Neuromuscular Re-education:  [x]  See flow sheet :    Rationale: increase strength, improve coordination, improve balance, and increase proprioception  to improve the patients ability to tolerate sustained postures                                                                 With   [] TE   [] TA   [] neuro   [] other: Patient Education: [x] Review HEP    [] Progressed/Changed HEP based on:   [] positioning   [] body mechanics   [] transfers   [] heat/ice application    [] other:       Other Objective/Functional Measures:  Seated CHENG/piriformis too difficult/uncomfortable for left LE, but able to perform for right LE.      Pain Level (0-10 scale) post treatment:  3/10     ASSESSMENT/Changes in Function:  Pain a little less post-Tx. Soreness with weightbearing exercises on left LE and for left hip aBd and clam shelss     Patient will continue to benefit from skilled PT services to modify and progress therapeutic interventions, address functional mobility deficits, address ROM deficits, address strength deficits, analyze and address soft tissue restrictions, analyze and cue movement patterns, analyze and modify body mechanics/ergonomics, assess and modify postural abnormalities, address imbalance/dizziness, and instruct in home and community integration to attain remaining goals. [x]  See Plan of Care  []  See progress note/recertification  []  See Discharge Summary         Progress towards goals / Updated goals:  Short Term Goals: To be accomplished in 4 weeks:  Pt independent and compliant with beginning HEP and self-care routing. Eval:  No HEP. Reports compliance  Decrease max pain scale rating by >/= 2 points. Eval:  6/10. Less pain today at 4/10  Improve FOTO by >/= 2-3 points. Eval:  55. Assess at 30 day elder  Increase left hip PROM for Flex, aBd and ER by >/= 10 degrees and mimimal discomfort. Eval:  90 deg flex, 30 deg abd, and 50 deg ER, all with pain. [facility] with single KTC, increasing FABERLong Term Goals: To be accomplished in 8 weeks:  Improve FOTO by >/= 5 points. Eval:  55. Assess at 30 day elder  Increase left LE MMT scores by >/= 1/3 to 2/3 grades and minimal discomfort. Eval:  3+ Abd, 4 Add, 4, IR, and 4+ ER and discomfort. No change to note thus far, but improved bridging and tolerance for T-band resisted hip aBd/ER in hooklying. Ambulation in home safely with SPC and no/minimal lateral lurching.               Eval:  slowed becky, decreased left stance time/antalgic, decreased bilat step/stride lengths, lateral lurching --maybe left LE longer versus right              Continues to ambulate with Trendelenburg gait  Decrease max pain scale rating by >/= 4-5 point. Eval:  6/10.   Less pain today at 4/10     PLAN  [x]  Upgrade activities as tolerated     [x]  Continue plan of care  []  Update interventions per flow sheet       []  Discharge due to:_  []  Other:_         Anthony Donohue, PT 12/27/2022  1:22 PM    Future Appointments   Date Time Provider Anastacia Shoemaker   12/27/2022  2:15 PM [facility] [address] [facility]   12/29/2022  9:45 AM Reji Byers PT [facility]

## 2022-12-29 ENCOUNTER — HOSPITAL ENCOUNTER (OUTPATIENT)
Dept: PHYSICAL THERAPY | Age: 67
End: 2022-12-29
Payer: MEDICARE

## 2022-12-29 PROCEDURE — 97530 THERAPEUTIC ACTIVITIES: CPT

## 2022-12-29 PROCEDURE — 97112 NEUROMUSCULAR REEDUCATION: CPT

## 2022-12-29 PROCEDURE — 97110 THERAPEUTIC EXERCISES: CPT

## 2022-12-29 NOTE — PROGRESS NOTES
PT DAILY TREATMENT NOTE     Patient Name: Indra Obrien  Date:2022  : 1955  [x]  Patient  Verified  Payor: [employer] / Plan: [facility] MEDICARE COMPLETE / Product Type: Managed Care Medicare /    In time: 9:45 AM  Out time:  10:35 AM  Total Treatment Time (min):  50 min. Visit #: 7 of 10           Medicare/BCBS Only   Total Timed Codes (min):  50 1:1 Treatment Time:  52         Treatment Area: Pain in left hip [M25.552]  Trochanteric bursitis, left hip [M70.62]  Pain in right hip [M25.551]     SUBJECTIVE  Pain Level (0-10 scale):  3/10  Any medication changes, allergies to medications, adverse drug reactions, diagnosis change, or new procedure performed?: [x] No    [] Yes (see summary sheet for update)  Subjective functional status/changes:   [] No changes reported  Pt c/o left lateral hip pain today--bothered by the weather.     OBJECTIVE    8 min []Eval                  [x]Re-Eval     20 min Therapeutic Exercise:  [x] See flow sheet :   Rationale: increase ROM and increase strength to improve the patients ability to increase activity tolerance     10 min Therapeutic Activity:  [x]  See flow sheet : squatting mechanics, standing functional hip strength, STS   Rationale: increase ROM, increase strength, and improve coordination  to improve the patients ability to perform heavy ADLs     9 min Neuromuscular Re-education:  [x]  See flow sheet :    Rationale: increase strength, improve coordination, improve balance, and increase proprioception  to improve the patients ability to tolerate sustained postures                                                                 With   [x] TE   [x] TA   [x] neuro   [] other: Patient Education: [x] Review HEP    [x] Progressed/Changed HEP based on:   [x] positioning   [x] body mechanics   [] transfers   [] heat/ice application    [x] other:  Patient performed new HEP activities and given new handouts with pictures and written directions for HEP; copies placed in chart      Other Objective/Functional Measures:  Very weak left hip aBduction and painful with attempted sidelying SLR. Pain Level (0-10 scale) post treatment:  2/10     ASSESSMENT/Changes in Function:  Pain a little less post-Tx. Soreness with weightbearing exercises on left LE and for left hip aBd and clam shells     Patient will continue to benefit from skilled PT services to modify and progress therapeutic interventions, address functional mobility deficits, address ROM deficits, address strength deficits, analyze and address soft tissue restrictions, analyze and cue movement patterns, analyze and modify body mechanics/ergonomics, assess and modify postural abnormalities, address imbalance/dizziness, and instruct in home and community integration to attain remaining goals. [x]  See Plan of Care  []  See progress note/recertification  []  See Discharge Summary         Progress towards goals / Updated goals:  Short Term Goals: To be accomplished in 4 weeks:  Pt independent and compliant with beginning HEP and self-care routing. Eval:  No HEP. Reports partial compliance. , doing HEP \"sometimes\"; new handouts issued  Decrease max pain scale rating by >/= 2 points. Eval:  6/10. Less pain today at 3/10 and 2/10 post-Tx. Improve FOTO by >/= 2-3 points. Eval:  55.  Score decreased to 44. Increase left hip PROM for Flex, aBd and ER by >/= 10 degrees and mimimal discomfort. Eval:  90 deg flex, 30 deg abd, and 50 deg ER, all with pain. Hip flexion nearly [facility] bilat with single KTC and left hip Abd PROM nearly [facility] with assisted hip aBd SLR in right sidelying. Long Term Goals: To be accomplished in 8 weeks:  Improve FOTO by >/= 5 points. Eval:  55.  Score decreased to 44. Increase left LE MMT scores by >/= 1/3 to 2/3 grades and minimal discomfort. Eval:  3+ Abd, 4 Add, 4, IR, and 4+ ER and discomfort.   ER = 5-/5 bilat; IR = 4 to 4+/5 left with discomfort and 5/5 right without pain; Add = 4+/5 left, 5/5 right and OK bilat; Hip Abd = <2+/5 and painful at attempted SLR in right sidelying. Ambulation in home safely with SPC and no/minimal lateral lurching. Eval:  slowed becky, decreased left stance time/antalgic, decreased bilat step/stride lengths, lateral lurching --maybe left LE longer versus right              Continues to ambulate with left lateral lurching in left stance phase  Decrease max pain scale rating by >/= 4-5 point. Eval:  6/10. Less pain today at 3/10 and 2/10 post-Tx. Functional Gains: able to stand and walk a little longer; easier to arise from sitting. Functional Deficits: walking on hard surfaces and uneven ground.   % improvement: 75%  Pain   Average: 3.5/10       Best: 3/10     Worst: 4/10  Patient Goal: \"Walk without pain or fear of falling\"     PLAN  [x]  Upgrade activities as tolerated     [x]  Continue plan of care  []  Update interventions per flow sheet       []  Discharge due to:_  []  Other:_       Zoë Can, PT 12/29/2022  9:43 AM    Future Appointments   Date Time Provider Anastacia Shoemaker   12/29/2022  9:45 AM [facility] PT [address] [facility]

## 2022-12-29 NOTE — PROGRESS NOTES
[facility] - [place] [address]  [address], [address] [phone] fax    Continued Plan of Care/ Re-certification for Physical Therapy Services    Patient name: Maddi Hahn Start of Care:  2022   Referral source: Lucinda Dee : 1955   Medical/Treatment Diagnosis: Pain in left hip [M25.552]  Trochanteric bursitis, left hip [M70.62]  Pain in right hip [M25.551]  Payor: [address] [place] [address] / Plan: [address] / Product Type: Managed Care Medicare /  Onset Date:  ~2022       Prior Hospitalization: see medical history Provider#: [id number]   Medications: Verified on Patient Summary List    Comorbidities: Arthritis, Breast CA, Hyperlipidemia, HTN, Osteopenia, DVT, PE, LBP/radiculopathy, GERD; bilat THRs  Prior Level of Function:ambulation with SPC, Right LE for stairs, prefers sidelying for sleep, independent ADL and light household chores, driving and community ambulation without restriction due to left hip pain  Visits from Start of Care: 7    Missed Visits: 0    The Plan of Care and following information is based on the patient's current status:  Short Term Goals: To be accomplished in 4 weeks:  Pt independent and compliant with beginning HEP and self-care routing. Eval:  No HEP. Reports partial compliance. , doing HEP \"sometimes\"; new handouts issued  Decrease max pain scale rating by >/= 2 points. Eval:  6/10. Less pain today at 3/10 and 2/10 post-Tx. Improve FOTO by >/= 2-3 points. Eval:  55.  Score decreased to 44. Increase left hip PROM for Flex, aBd and ER by >/= 10 degrees and mimimal discomfort. Eval:  90 deg flex, 30 deg abd, and 50 deg ER, all with pain. Hip flexion nearly [facility] bilat with single KTC and left hip Abd PROM nearly [facility] with assisted hip aBd SLR in right sidelying. Long Term Goals: To be accomplished in 8 weeks:  Improve FOTO by >/= 5 points.     Eval:  55.  Score decreased to 44.  Increase left LE MMT scores by >/= 1/3 to 2/3 grades and minimal discomfort. Eval:  3+ Abd, 4 Add, 4, IR, and 4+ ER and discomfort. ER = 5-/5 bilat; IR = 4 to 4+/5 left with discomfort and 5/5 right without pain; Add = 4+/5 left, 5/5 right and OK bilat; Hip Abd = <2+/5 and painful at attempted SLR in right sidelying. Ambulation in home safely with SPC and no/minimal lateral lurching. Eval:  slowed becky, decreased left stance time/antalgic, decreased bilat step/stride lengths, lateral lurching --maybe left LE longer versus right              Continues to ambulate with left lateral lurching in left stance phase  Decrease max pain scale rating by >/= 4-5 point. Eval:  6/10. Less pain today at 3/10 and 2/10 post-Tx. Functional Gains: able to stand and walk a little longer; easier to arise from sitting. Functional Deficits: walking on hard surfaces and uneven ground. % improvement: 75%  Pain   Average: 3.5/10       Best: 3/10     Worst: 4/10  Patient Goal: \"Walk without pain or fear of falling. \"    Key functional changes:  Improved transfers and activity tolerance, decreased pain; some improvement in left hip strength and ROM for mobility. Problems/ barriers to goal attainment: left hip pain and weakness    Problem List: pain affecting function, decrease ROM, decrease strength, impaired gait/ balance, decrease ADL/ functional abilitiies, decrease activity tolerance, decrease flexibility/ joint mobility, and decrease transfer abilities    Treatment Plan: Therapeutic exercise, Neuromuscular reeducation, Manual therapy, Therapeutic activity, Self care/home management, Electric stim unattended , Gait training, and Ultrasound     Patient Goal (s) has been updated and includes:  \"Walk without pain or fear of falling. \"    Goals for this certification period to be accomplished in 4 weeks:  Improve FOTO by >/= 5 points.     Recert:  44, was 55 at eval.    Increase left LE MMT scores by >/= 1/3 to 2/3 grades and minimal discomfort. Recert:  ER = 5-/5 bilat; IR = 4 to 4+/5 left with discomfort and 5/5 right without pain; Add = 4+/5 left, 5/5 right and OK bilat; Hip Abd = <2+/5 and painful at attempted SLR in right sidelying (Was 3+ Abd, 4 Add, 4, IR, and 4+ ER and discomfort at eval). Ambulation in home safely with SPC and no/minimal lateral lurching. Recert:  Continues to ambulate with left lateral lurching in left stance phase (was slowed becky, decreased left stance time/antalgic, decreased bilat step/stride lengths, lateral lurchingat eval). Decrease max pain scale rating to  >/= 1-2/10. Recert:  9/07 max. Frequency / Duration: Patient to be seen 2 times per week for 4 weeks:    Assessment / Recommendations:  Pt would benefit from continued skilled PT to address above deficits and to work on left hip strength and gait/balance to improve mobility, and safety and prevent chronic hip pain; progressing HEP as tolerated. Re-Certification Period:  12/30/2022 to 01/28/2023    Messi Lynn, PT 12/29/2022 9:53 AM    ________________________________________________________________________  I certify that the above Therapy Services are being furnished while the patient is under my care. I agree with the treatment plan and certify that this therapy is necessary. [] I have read the above and request that my patient continue as recommended.   [] I have read the above report and request that my patient continue therapy with the following changes/special instructions: _____________________________________________  [] I have read the above report and request that my patient be discharged from therapy    Physician's Signature:____________Date:_________TIME:________     Sherri Jacome PA-C  ** Signature, Date and Time must be completed for valid certification **    Please sign and return to [facility] - [address] [address]  [phone] fax

## 2023-01-03 ENCOUNTER — HOSPITAL ENCOUNTER (OUTPATIENT)
Dept: PHYSICAL THERAPY | Age: 68
Discharge: HOME | End: 2023-01-03
Payer: MEDICARE

## 2023-01-03 PROCEDURE — 97110 THERAPEUTIC EXERCISES: CPT

## 2023-01-03 PROCEDURE — 97112 NEUROMUSCULAR REEDUCATION: CPT

## 2023-01-03 PROCEDURE — 97530 THERAPEUTIC ACTIVITIES: CPT

## 2023-01-03 NOTE — PROGRESS NOTES
PT DAILY TREATMENT NOTE     Patient Name: Sasha Hughes  Date:1/3/2023  : 1955  [x]  Patient  Verified  Payor: [employer] MEDICARE COMPLETE / [facility] / Product Type: Managed Care Medicare /    In time:1130  Out time:1217  Total Treatment Time (min): 52  Visit #: 1 of 8    Medicare/BCBS Only   Total Timed Codes (min):  47 1:1 Treatment Time:  52       Treatment Area: Pain in left hip [M25.552]  Trochanteric bursitis, left hip [M70.62]  Pain in right hip [M25.551]    SUBJECTIVE  Pain Level (0-10 scale): 3-4  Any medication changes, allergies to medications, adverse drug reactions, diagnosis change, or new procedure performed?: [x] No    [] Yes (see summary sheet for update)  Subjective functional status/changes:   [] No changes reported  Patient reports her left hip is hurting today. She is having trouble lifting her leg.     OBJECTIVE    15 min Therapeutic Exercise:  [x] See flow sheet :   Rationale: increase ROM and increase strength to improve the patients ability to increase activity tolerance    15 min Therapeutic Activity:  [x]  See flow sheet : standing functional hip strength, squatting mechanics   Rationale: increase ROM, increase strength, and improve coordination  to improve the patients ability to perform heavy ADLs     17 min Neuromuscular Re-education:  [x]  See flow sheet : glut re-ed   Rationale: increase strength, improve coordination, improve balance, and increase proprioception  to improve the patients ability to tolerate sustained postures          With   [] TE   [] TA   [] neuro   [] other: Patient Education: [x] Review HEP    [] Progressed/Changed HEP based on:   [] positioning   [] body mechanics   [] transfers   [] heat/ice application    [] other:      Other Objective/Functional Measures: progressed per flow sheet     Pain Level (0-10 scale) post treatment: 1-2    ASSESSMENT/Changes in Function: Patient reports increased left hip pain and inability to left her leg while lying on her side. Explained to patient the difficulty is due to glut med weakness and we will progress her strength in standing with resistance and then in s/l. Added lateral ambulation in \\ bars with cuing to avoid hip ER substitution which she tolerated well. She was fatigued but reported improvement in pain following session. Patient will continue to benefit from skilled PT services to modify and progress therapeutic interventions, address functional mobility deficits, address ROM deficits, address strength deficits, analyze and address soft tissue restrictions, analyze and cue movement patterns, analyze and modify body mechanics/ergonomics, assess and modify postural abnormalities, address imbalance/dizziness, and instruct in home and community integration to attain remaining goals. []  See Plan of Care  []  See progress note/recertification  []  See Discharge Summary         Progress towards goals / Updated goals:  Goals for this certification period to be accomplished in 4 weeks:  Improve FOTO by >/= 5 points. Recert:  44, was 55 at eval.     Increase left LE MMT scores by >/= 1/3 to 2/3 grades and minimal discomfort. Recert:  ER = 5-/5 bilat; IR = 4 to 4+/5 left with discomfort and 5/5 right without pain; Add = 4+/5 left, 5/5 right and OK bilat; Hip Abd = <2+/5 and painful at attempted SLR in right sidelying (Was 3+ Abd, 4 Add, 4, IR, and 4+ ER and discomfort at eval). Ambulation in home safely with SPC and no/minimal lateral lurching. Recert:  Continues to ambulate with left lateral lurching in left stance phase (was slowed becky, decreased left stance time/antalgic, decreased bilat step/stride lengths, lateral lurchingat eval). Decrease max pain scale rating to  >/= 1-2/10. Recert:  3/05 max.     PLAN  [x]  Upgrade activities as tolerated     [x]  Continue plan of care  []  Update interventions per flow sheet       []  Discharge due to:_  []  Other:_ Marcos Rangel, PTA 1/3/2023  11:27 AM    Future Appointments   Date Time Provider Anastacia Middletoni   1/3/2023 11:30 AM Nicholas Duncan [facility]   1/5/2023 11:30 AM Rafita Soto, PT [facility]   1/10/2023 11:30 AM Anthony Cruz, PT [facility]   1/12/2023 11:30 AM Rafita Soto, PT [facility]   1/17/2023 11:30 AM Celester Bosworth, PTA [facility]   1/19/2023 11:30 AM [facility] PT [facility]   1/24/2023 11:30 AM Anthony Cruz, PT [facility]   1/26/2023 11:30 AM Rafita Soto, PT [facility]

## 2023-01-05 ENCOUNTER — HOSPITAL ENCOUNTER (OUTPATIENT)
Dept: PHYSICAL THERAPY | Age: 68
Discharge: HOME | End: 2023-01-05
Payer: MEDICARE

## 2023-01-05 PROCEDURE — 97110 THERAPEUTIC EXERCISES: CPT

## 2023-01-05 PROCEDURE — 97112 NEUROMUSCULAR REEDUCATION: CPT

## 2023-01-05 PROCEDURE — 97530 THERAPEUTIC ACTIVITIES: CPT

## 2023-01-05 NOTE — PROGRESS NOTES
PT DAILY TREATMENT NOTE     Patient Name: Mel Rogers  Date:2023  : 1955  [x]  Patient  Verified  Payor: [employer] MEDICARE COMPLETE / Plan: [address] / Product Type: Managed Care Medicare /    In time:1130  Out time:1220  Total Treatment Time (min): 50  Visit #: 2 of 10    Medicare/BCBS Only   Total Timed Codes (min):  40 1:1 Treatment Time:  40       Treatment Area: Pain in left hip [M25.552]  Trochanteric bursitis, left hip [M70.62]  Pain in right hip [M25.551]    SUBJECTIVE  Pain Level (0-10 scale): 3  Any medication changes, allergies to medications, adverse drug reactions, diagnosis change, or new procedure performed?: [x] No    [] Yes (see summary sheet for update)  Subjective functional status/changes:   [] No changes reported  \"My left hip is bothering me. \"    OBJECTIVE    Modality rationale: decrease pain to improve the patients ability to improve mobility and gait   Min Type Additional Details    [] Estim:  []Unatt       []IFC  []Premod                        []Other:  []w/ice   []w/heat  Position:  Location:    [] Estim: []Att    []TENS instruct  []NMES                    []Other:  []w/US   []w/ice   []w/heat  Position:  Location:    []  Traction: [] Cervical       []Lumbar                       [] Prone          []Supine                       []Intermittent   []Continuous Lbs:  [] before manual  [] after manual    []  Ultrasound: []Continuous   [] Pulsed                           []1MHz   []3MHz W/cm2:  Location:    []  Iontophoresis with dexamethasone         Location: [] Take home patch   [] In clinic   10 [x]  Ice     []  heat  []  Ice massage  []  Laser   []  Anodyne Position: reclined with wedge   Location: left hip    []  Laser with stim  []  Other:  Position:  Location:    []  Vasopneumatic Device    []  Right     []  Left  Pre-treatment girth:  Post-treatment girth:  Measured at (location):  Pressure:       [] lo [] med [] hi   Temperature: [] lo [] med [] hi   [x] Skin assessment post-treatment:  [x]intact []redness- no adverse reaction    []redness - adverse reaction:     10 min Therapeutic Exercise:  [x] See flow sheet :   Rationale: increase ROM and increase strength to improve the patients ability to perform ADLs    15 min Therapeutic Activity:  [x]  See flow sheet : functional standing activities, squatting mechanics, sit to stands   Rationale: increase ROM, increase strength, improve coordination, improve balance, and increase proprioception  to improve the patients ability to improve mobility and ADL performance     15 min Neuromuscular Re-education:  [x]  See flow sheet : hip/glut re-ed activities    Rationale: increase ROM, increase strength, improve coordination, improve balance, and increase proprioception  to improve the patients ability to improve mobility, stance stability, and gait         With   [x] TE   [x] TA   [x] neuro   [] other: Patient Education: [x] Review HEP    [] Progressed/Changed HEP based on:   [x] positioning   [x] body mechanics   [] transfers   [] heat/ice application    [] other:      Other Objective/Functional Measures:      Pain Level (0-10 scale) post treatment: 3    ASSESSMENT/Changes in Function: Pt was able to progress the resistance with standing exercises. Cuing needed to improve squatting mechanics. Continues to report left hip pain and ambulates with a Trendelenburg gait. She reports an improvement with right hip pain. Patient will continue to benefit from skilled PT services to modify and progress therapeutic interventions, address functional mobility deficits, address ROM deficits, address strength deficits, analyze and address soft tissue restrictions, analyze and cue movement patterns, analyze and modify body mechanics/ergonomics, assess and modify postural abnormalities, address imbalance/dizziness, and instruct in home and community integration to attain remaining goals.      [x]  See Plan of Care  []  See progress note/recertification  []  See Discharge Summary         Progress towards goals / Updated goals:  Goals for this certification period to be accomplished in 4 weeks:  Improve FOTO by >/= 5 points. Recert:  44, was 55 at eval.  Assess at next recert   Increase left LE MMT scores by >/= 1/3 to 2/3 grades and minimal discomfort. Recert:  ER = 5-/5 bilat; IR = 4 to 4+/5 left with discomfort and 5/5 right without pain; Add = 4+/5 left, 5/5 right and OK bilat; Hip Abd = <2+/5 and painful at attempted SLR in right sidelying (Was 3+ Abd, 4 Add, 4, IR, and 4+ ER and discomfort at eval). Making progress  Ambulation in home safely with SPC and no/minimal lateral lurching. Recert:  Continues to ambulate with left lateral lurching in left stance phase (was slowed becky, decreased left stance time/antalgic, decreased bilat step/stride lengths, lateral lurchingat eval). Continues to ambulate with a Trendelenburg gait  Decrease max pain scale rating to  >/= 1-2/10. Recert:  9/45 max.   Pain appears to be declining     PLAN  []  Upgrade activities as tolerated     [x]  Continue plan of care  []  Update interventions per flow sheet       []  Discharge due to:_  []  Other:_      [place] Oats, PTA, CSCS 1/5/2023  12:28 PM    Future Appointments   Date Time Provider Anastacia Shoemaker   1/10/2023 11:30 AM Jc Sera, PT [facility]   1/12/2023 11:30 AM Dank Reddy, PT [facility]   1/17/2023 11:30 AM Ferny Yanes, PTA [facility]   1/19/2023 11:30 AM [facility] PT [address] [facility]   1/24/2023 11:30 AM Jc Sera, PT [facility]   1/26/2023 11:30 [facility]

## 2023-01-10 ENCOUNTER — HOSPITAL ENCOUNTER (OUTPATIENT)
Dept: PHYSICAL THERAPY | Age: 68
Discharge: HOME | End: 2023-01-10
Payer: MEDICARE

## 2023-01-10 PROCEDURE — 97530 THERAPEUTIC ACTIVITIES: CPT

## 2023-01-10 PROCEDURE — 97110 THERAPEUTIC EXERCISES: CPT

## 2023-01-10 NOTE — PROGRESS NOTES
PT DAILY TREATMENT NOTE     Patient Name: Lance Fernandes  Date:1/10/2023  : 1955  [x]  Patient  Verified  Payor: [employer] MEDICARE COMPLETE / Plan: [address] / Product Type: Managed Care Medicare /    In time:1130  Out time:1220  Total Treatment Time (min): 50  Visit #: 3 of 10    Medicare/BCBS Only   Total Timed Codes (min):  50 1:1 Treatment Time:  30       Treatment Area: Pain in left hip [M25.552]  Trochanteric bursitis, left hip [M70.62]  Pain in right hip [M25.551]    SUBJECTIVE  Pain Level (0-10 scale): 3  Any medication changes, allergies to medications, adverse drug reactions, diagnosis change, or new procedure performed?: [x] No    [] Yes (see summary sheet for update)  Subjective functional status/changes:   [] No changes reported  Pt has no significant changes to report    OBJECTIVE    Modality rationale: Pt declined   Min Type Additional Details    [] Estim:  []Unatt       []IFC  []Premod                        []Other:  []w/ice   []w/heat  Position:  Location:    [] Estim: []Att    []TENS instruct  []NMES                    []Other:  []w/US   []w/ice   []w/heat  Position:  Location:    []  Traction: [] Cervical       []Lumbar                       [] Prone          []Supine                       []Intermittent   []Continuous Lbs:  [] before manual  [] after manual    []  Ultrasound: []Continuous   [] Pulsed                           []1MHz   []3MHz W/cm2:  Location:    []  Iontophoresis with dexamethasone         Location: [] Take home patch   [] In clinic    []  Ice     []  heat  []  Ice massage  []  Laser   []  Anodyne Position:  Location:    []  Laser with stim  []  Other:  Position:  Location:    []  Vasopneumatic Device    []  Right     []  Left  Pre-treatment girth:  Post-treatment girth:  Measured at (location):  Pressure:       [] lo [] med [] hi   Temperature: [] lo [] med [] hi   [] Skin assessment post-treatment:  []intact []redness- no adverse reaction []redness - adverse reaction:     30 min Therapeutic Exercise:  [x] See flow sheet :   Rationale: increase ROM and increase strength to improve the patients ability to perform ADLs     20 min Therapeutic Activity:  [x]  See flow sheet : standing functional LE strength   Rationale: increase strength, improve coordination, improve balance, and increase proprioception  to improve the patients ability to normalize gait       With   [] TE   [] TA   [] neuro   [] other: Patient Education: [x] Review HEP    [] Progressed/Changed HEP based on:   [] positioning   [] body mechanics   [] transfers   [] heat/ice application    [] other:      Other Objective/Functional Measures: see flow sheet     Pain Level (0-10 scale) post treatment: 3    ASSESSMENT/Changes in Function: Progressed lateral walks in the bars to 1 UE support, which resulted in an increase in trendelenburg. Pt was able to reduce with verbal cuing but not eliminate it due to functional weakness of the glut meds. Patient will continue to benefit from skilled PT services to modify and progress therapeutic interventions, address functional mobility deficits, address ROM deficits, address strength deficits, analyze and address soft tissue restrictions, analyze and cue movement patterns, analyze and modify body mechanics/ergonomics, assess and modify postural abnormalities, address imbalance/dizziness, and instruct in home and community integration to attain remaining goals. []  See Plan of Care  []  See progress note/recertification  []  See Discharge Summary         Progress towards goals / Updated goals:  Improve FOTO by >/= 5 points. Recert:  44, was 55 at eval.  Reassess at end of POC  Increase left LE MMT scores by >/= 1/3 to 2/3 grades and minimal discomfort. Recert:  ER = 5-/5 bilat; IR = 4 to 4+/5 left with discomfort and 5/5 right without pain; Add = 4+/5 left, 5/5 right and OK bilat;  Hip Abd = <2+/5 and painful at attempted SLR in right sidelying (Was 3+ Abd, 4 Add, 4, IR, and 4+ ER and discomfort at eval). Tolerating progression of exercises, continued function strength deficits as noted by gait  Ambulation in home safely with SPC and no/minimal lateral lurching. Recert:  Continues to ambulate with left lateral lurching in left stance phase (was slowed becky, decreased left stance time/antalgic, decreased bilat step/stride lengths, lateral lurchingat eval). No significant change; cues with lateral walks to reduce trendelenburg  Decrease max pain scale rating to  >/= 1-2/10.     Recert:  7/02 max.  7 day max pain = 4/10    PLAN  [x]  Upgrade activities as tolerated     [x]  Continue plan of care  []  Update interventions per flow sheet       []  Discharge due to:_  []  Other:_      Layton Campos, PT 1/10/2023  11:53 AM    Future Appointments   Date Time Provider Anastacia Shoemaker   1/12/2023 11:30 AM Arcadio Nunn PT [facility]   1/17/2023 11:30 AM Michael Posada PTA [facility]   1/19/2023 11:30 AM [facility]   1/24/2023 11:30 AM Renan Marte PT [facility]   1/26/2023 11:30 AM Arcadio Nunn PT [facility]

## 2023-01-12 ENCOUNTER — HOSPITAL ENCOUNTER (OUTPATIENT)
Dept: PHYSICAL THERAPY | Age: 68
Discharge: HOME | End: 2023-01-12
Payer: MEDICARE

## 2023-01-12 PROCEDURE — 97530 THERAPEUTIC ACTIVITIES: CPT

## 2023-01-12 PROCEDURE — 97112 NEUROMUSCULAR REEDUCATION: CPT

## 2023-01-12 PROCEDURE — 97110 THERAPEUTIC EXERCISES: CPT

## 2023-01-12 NOTE — PROGRESS NOTES
PT DAILY TREATMENT NOTE     Patient Name: Carmenza Girard  Date:2023  : 1955  [x]  Patient  Verified  Payor: AARP MEDICARE COMPLETE / Plan: [facility] MEDICARE COMPLETE / Product Type: Managed Care Medicare /    In time:1130  Out time:1216  Total Treatment Time (min): 55  Visit #: 4 of 10    Medicare/BCBS Only   Total Timed Codes (min):  46 1:1 Treatment Time:  38       Treatment Area: Pain in left hip [M25.552]  Trochanteric bursitis, left hip [M70.62]  Pain in right hip [M25.551]    SUBJECTIVE  Pain Level (0-10 scale): 3  Any medication changes, allergies to medications, adverse drug reactions, diagnosis change, or new procedure performed?: [x] No    [] Yes (see summary sheet for update)  Subjective functional status/changes:   [] No changes reported  \"My hips are sore. \"    OBJECTIVE    Modality rationale: Patient declined   Min Type Additional Details    [] Estim:  []Unatt       []IFC  []Premod                        []Other:  []w/ice   []w/heat  Position:  Location:    [] Estim: []Att    []TENS instruct  []NMES                    []Other:  []w/US   []w/ice   []w/heat  Position:  Location:    []  Traction: [] Cervical       []Lumbar                       [] Prone          []Supine                       []Intermittent   []Continuous Lbs:  [] before manual  [] after manual    []  Ultrasound: []Continuous   [] Pulsed                           []1MHz   []3MHz W/cm2:  Location:    []  Iontophoresis with dexamethasone         Location: [] Take home patch   [] In clinic    []  Ice     []  heat  []  Ice massage  []  Laser   []  Anodyne Position:  Location:    []  Laser with stim  []  Other:  Position:  Location:    []  Vasopneumatic Device    []  Right     []  Left  Pre-treatment girth:  Post-treatment girth:  Measured at (location):  Pressure:       [] lo [] med [] hi   Temperature: [] lo [] med [] hi   [] Skin assessment post-treatment:  []intact []redness- no adverse reaction    []redness - adverse reaction:     15 min Therapeutic Exercise:  [x] See flow sheet :   Rationale: increase ROM and increase strength to improve the patients ability to perform ADLs    15 min Therapeutic Activity:  [x]  See flow sheet : functional standing activities   Rationale: increase ROM, increase strength, improve coordination, improve balance, and increase proprioception  to improve the patients ability to improve mobility and ADL performance     16 min Neuromuscular Re-education:  [x]  See flow sheet : hip/glut re-ed activities    Rationale: increase ROM, increase strength, improve coordination, improve balance, and increase proprioception  to improve the patients ability to improve mobility, stance stability, and gait         With   [x] TE   [x] TA   [x] neuro   [] other: Patient Education: [x] Review HEP    [] Progressed/Changed HEP based on:   [x] positioning   [x] body mechanics   [] transfers   [] heat/ice application    [] other:      Other Objective/Functional Measures:      Pain Level (0-10 scale) post treatment: 2    ASSESSMENT/Changes in Function: Pt continues to progress with mobility, but still has hip/glut weakness as she requires A with SLR in supine and sidelying hip abduction. Balance challenged in the parallel bars and would benefit from increased balance training. Patient will continue to benefit from skilled PT services to modify and progress therapeutic interventions, address functional mobility deficits, address ROM deficits, address strength deficits, analyze and address soft tissue restrictions, analyze and cue movement patterns, analyze and modify body mechanics/ergonomics, assess and modify postural abnormalities, address imbalance/dizziness, and instruct in home and community integration to attain remaining goals. [x]  See Plan of Care  []  See progress note/recertification  []  See Discharge Summary         Progress towards goals / Updated goals:  Improve FOTO by >/= 5 points. Recert:  44, was 55 at eval.  Reassess at end of POC  Increase left LE MMT scores by >/= 1/3 to 2/3 grades and minimal discomfort. Recert:  ER = 5-/5 bilat; IR = 4 to 4+/5 left with discomfort and 5/5 right without pain; Add = 4+/5 left, 5/5 right and OK bilat; Hip Abd = <2+/5 and painful at attempted SLR in right sidelying (Was 3+ Abd, 4 Add, 4, IR, and 4+ ER and discomfort at eval). Tolerating progression of exercises, continued function strength deficits as noted by gait  Ambulation in home safely with SPC and no/minimal lateral lurching. Recert:  Continues to ambulate with left lateral lurching in left stance phase (was slowed becky, decreased left stance time/antalgic, decreased bilat step/stride lengths, lateral lurchingat eval). No significant change; cues with lateral walks to reduce trendelenburg  Decrease max pain scale rating to  >/= 1-2/10.     Recert:  9/38 max.  7 day max pain = 4/10    PLAN  []  Upgrade activities as tolerated     [x]  Continue plan of care  []  Update interventions per flow sheet       []  Discharge due to:_  []  Other:_      Lalitha Gallagher PTA, [facility] 1/12/2023  12:21 PM    Future Appointments   Date Time Provider Anastacia Shoemaker   1/17/2023 11:30 AM Roberta Rodríguez PTA [facility]   1/19/2023 11:30 AM [facility]   1/24/2023 11:30 AM Riccardo Strickland, PT [facility]   1/26/2023 11:30 AM Maci Butler PT [facility]

## 2023-01-17 ENCOUNTER — HOSPITAL ENCOUNTER (OUTPATIENT)
Dept: PHYSICAL THERAPY | Age: 68
Discharge: HOME | End: 2023-01-17
Payer: MEDICARE

## 2023-01-17 PROCEDURE — 97112 NEUROMUSCULAR REEDUCATION: CPT

## 2023-01-17 PROCEDURE — 97110 THERAPEUTIC EXERCISES: CPT

## 2023-01-17 PROCEDURE — 97530 THERAPEUTIC ACTIVITIES: CPT

## 2023-01-19 ENCOUNTER — HOSPITAL ENCOUNTER (OUTPATIENT)
Dept: PHYSICAL THERAPY | Age: 68
Discharge: HOME | End: 2023-01-19
Payer: MEDICARE

## 2023-01-19 PROCEDURE — 97112 NEUROMUSCULAR REEDUCATION: CPT

## 2023-01-19 PROCEDURE — 97110 THERAPEUTIC EXERCISES: CPT

## 2023-01-19 PROCEDURE — 97530 THERAPEUTIC ACTIVITIES: CPT

## 2023-01-19 NOTE — PROGRESS NOTES
PT DAILY TREATMENT NOTE     Patient Name: Coleen Oscar  Date:  2023  :  1955  [x]  Patient  Verified  Payor: Jacques Joyner / Plan: [facility] MEDICARE COMPLETE / Product Type: Managed Care Medicare /    In time:  11:31 AM  Out time:  12:35 PM  Total Treatment Time (min): 64 min. Visit #: 6 of 10         Medicare/BCBS Only   Total Timed Codes (min):  57 1:1 Treatment Time:  53 min. Treatment Area: Pain in left hip [M25.552]  Trochanteric bursitis, left hip [M70.62]  Pain in right hip [M25.551]     SUBJECTIVE  Pain Level (0-10 scale):  2/10  Any medication changes, allergies to medications, adverse drug reactions, diagnosis change, or new procedure performed?: [x] No    [] Yes (see summary sheet for update)  Subjective functional status/changes:   [] No changes reported  Patient reports left hip a little sore currently; okay after last PT session. OBJECTIVE    Modality rationale: decrease edema, decrease inflammation, and decrease pain to improve the patients ability to tolerate ADLs and community mobility, ADLs.    Min Type Additional Details    [] Estim:  []Unatt       []IFC  []Premod                        []Other:  []w/ice   []w/heat  Position:  Location:    [] Estim: []Att    []TENS instruct  []NMES                    []Other:  []w/US   []w/ice   []w/heat  Position:  Location:    []  Traction: [] Cervical       []Lumbar                       [] Prone          []Supine                       []Intermittent   []Continuous Lbs:  [] before manual  [] after manual    []  Ultrasound: []Continuous   [] Pulsed                           []1MHz   []3MHz W/cm2:  Location:    []  Iontophoresis with dexamethasone         Location: [] Take home patch   [] In clinic   7 []  Ice     [x]  Heat--Post-Tx  []  Ice massage  []  Laser   []  Anodyne Position:  supine  Location:  left hip    []  Laser with stim  []  Other:  Position:  Location:    []  Vasopneumatic Device    []  Right []  Left  Pre-treatment girth:  Post-treatment girth:  Measured at (location):  Pressure:       [] lo [] med [] hi   Temperature: [] lo [] med [] hi   [x] Skin assessment post-treatment:  [x]intact []redness- no adverse reaction    []redness - adverse reaction:      32 min Therapeutic Exercise:  [x] See flow sheet :   Rationale: increase ROM and increase strength to improve the patients ability to increase activity tolerance     15 min Therapeutic Activity:  [x]  See flow sheet : standing functional hip strength, squatting, step ups   Rationale: increase ROM, increase strength, and improve coordination  to improve the patients ability to perform heavy ADLs and transfers     10 min Neuromuscular Re-education:  [x]  See flow sheet : balance, glut re-ed   Rationale: increase strength, improve coordination, improve balance, and increase proprioception  to improve the patients ability to tolerate sustained postures     With   [] TE   [] TA   [] neuro   [] other: Patient Education: [x] Review HEP    [] Progressed/Changed HEP based on:   [] positioning   [] body mechanics   [] transfers   [] heat/ice application    [] other:       Other Objective/Functional Measures:  Weak for resisted left hip ER and IR in supine and sitting against T-band. Pain Level (0-10 scale) post treatment:  1/10     ASSESSMENT/Changes in Function:  Patient contoinues to demonstrate left lateral lurch and trendelenburg gait. A little soreness with exercises during session, but felt better at end.      Patient will continue to benefit from skilled PT services to modify and progress therapeutic interventions, address functional mobility deficits, address ROM deficits, address strength deficits, analyze and address soft tissue restrictions, analyze and cue movement patterns, analyze and modify body mechanics/ergonomics, assess and modify postural abnormalities, address imbalance/dizziness, and instruct in home and community integration to attain remaining goals. [x]  See Plan of Care  []  See progress note/recertification  []  See Discharge Summary         Progress towards goals / Updated goals:  Improve FOTO by >/= 5 points. Recert:  44, was 55 at eval.  Reassess at end of POC  Increase left LE MMT scores by >/= 1/3 to 2/3 grades and minimal discomfort. Recert:  ER = 5-/5 bilat; IR = 4 to 4+/5 left with discomfort and 5/5 right without pain; Add = 4+/5 left, 5/5 right and OK bilat; Hip Abd = <2+/5 and painful at attempted SLR in right sidelying (Was 3+ Abd, 4 Add, 4, IR, and 4+ ER and discomfort at eval). Tolerating progression of exercises, continued function strength deficits as noted by gait  Ambulation in home safely with SPC and no/minimal lateral lurching. Recert:  Continues to ambulate with left lateral lurching in left stance phase (was slowed becky, decreased left stance time/antalgic, decreased bilat step/stride lengths, lateral lurchingat eval). No significant change; cues with lateral walks to reduce trendelenburg  Decrease max pain scale rating to  >/= 1-2/10. Recert:  0/72 max. Today up to 2/10.      PLAN  [x]  Upgrade activities as tolerated     [x]  Continue plan of care  []  Update interventions per flow sheet       []  Discharge due to:_  []  Other:_      William Morales, PT 1/19/2023  9:38 AM    Future Appointments   Date Time Provider Anastacia Shoemaker   1/19/2023 11:30 AM [facility]   1/24/2023 11:30 AM [facility]   1/26/2023 11:30 AM [facility]

## 2023-01-24 ENCOUNTER — HOSPITAL ENCOUNTER (OUTPATIENT)
Dept: PHYSICAL THERAPY | Age: 68
Discharge: HOME | End: 2023-01-24
Payer: MEDICARE

## 2023-01-24 PROCEDURE — 97112 NEUROMUSCULAR REEDUCATION: CPT

## 2023-01-24 PROCEDURE — 97110 THERAPEUTIC EXERCISES: CPT

## 2023-01-24 PROCEDURE — 97530 THERAPEUTIC ACTIVITIES: CPT

## 2023-01-24 NOTE — PROGRESS NOTES
PT DAILY TREATMENT NOTE     Patient Name: Hodan Castillo  Date:2023  : 1955  [x]  Patient  Verified  Payor: [employer]ARP MEDICARE COMPLETE / [facility] / Product Type: Managed Care Medicare /    In time:1130  Out time:1209  Total Treatment Time (min): 39  Visit #: 7 of 10    Medicare/BCBS Only   Total Timed Codes (min):  39 1:1 Treatment Time:  39       Treatment Area: Pain in left hip [M25.552]  Trochanteric bursitis, left hip [M70.62]  Pain in right hip [M25.551]    SUBJECTIVE  Pain Level (0-10 scale): 0  Any medication changes, allergies to medications, adverse drug reactions, diagnosis change, or new procedure performed?: [x] No    [] Yes (see summary sheet for update)  Subjective functional status/changes:   [] No changes reported  Pt has no pain today     OBJECTIVE    10 min Therapeutic Exercise:  [x] See flow sheet :   Rationale: increase ROM and increase strength to improve the patients ability to perform ADLs     20 min Therapeutic Activity:  [x]  See flow sheet : functional LE strength   Rationale: increase strength and improve coordination  to improve the patients ability to negotiate home and community      9 min Neuromuscular Re-education:  [x]  See flow sheet : balance, glut dayton   Rationale: increase strength, improve coordination, improve balance, and increase proprioception  to improve the patients ability to normalize gait          With   [] TE   [] TA   [] neuro   [] other: Patient Education: [x] Review HEP    [] Progressed/Changed HEP based on:   [] positioning   [] body mechanics   [] transfers   [] heat/ice application    [] other:      Other Objective/Functional Measures: progressed exercises per flow sheet     Pain Level (0-10 scale) post treatment: 0    ASSESSMENT/Changes in Function: Pt tolerated progression of exercises with out increase in symptoms. Pt did well with performing squats unassisted, required cues to keep chest upright.  Pt was challenged and fatigued by sidelying abductor/ER strength, requiring mod A for sidelying hip abduction. Pt due for reassessment NV    Patient will continue to benefit from skilled PT services to modify and progress therapeutic interventions, address functional mobility deficits, address ROM deficits, address strength deficits, analyze and address soft tissue restrictions, analyze and cue movement patterns, analyze and modify body mechanics/ergonomics, assess and modify postural abnormalities, address imbalance/dizziness, and instruct in home and community integration to attain remaining goals. []  See Plan of Care  []  See progress note/recertification  []  See Discharge Summary         Progress towards goals / Updated goals:  Improve FOTO by >/= 5 points. Recert:  44, was 55 at eval.  Reassess NV  Increase left LE MMT scores by >/= 1/3 to 2/3 grades and minimal discomfort. Recert:  ER = 5-/5 bilat; IR = 4 to 4+/5 left with discomfort and 5/5 right without pain; Add = 4+/5 left, 5/5 right and OK bilat; Hip Abd = <2+/5 and painful at attempted SLR in right sidelying (Was 3+ Abd, 4 Add, 4, IR, and 4+ ER and discomfort at eval). Reassess NV  Ambulation in home safely with SPC and no/minimal lateral lurching. Recert:  Continues to ambulate with left lateral lurching in left stance phase (was slowed becky, decreased left stance time/antalgic, decreased bilat step/stride lengths, lateral lurchingat eval). Reassess NV  Decrease max pain scale rating to  >/= 1-2/10. Recert:  9/08 max.   Reassess NV    PLAN  [x]  Upgrade activities as tolerated     [x]  Continue plan of care  []  Update interventions per flow sheet       []  Discharge due to:_  []  Other:_      Juan Arrieta, PT 1/24/2023  11:43 AM    Future Appointments   Date Time Provider Anastacia Shoemaker   1/26/2023 11:30 AM Augustine Villalobos, PT [facility]

## 2023-01-26 ENCOUNTER — HOSPITAL ENCOUNTER (OUTPATIENT)
Dept: PHYSICAL THERAPY | Age: 68
Discharge: HOME | End: 2023-01-26
Payer: MEDICARE

## 2023-01-26 PROCEDURE — 97530 THERAPEUTIC ACTIVITIES: CPT

## 2023-01-26 PROCEDURE — 97112 NEUROMUSCULAR REEDUCATION: CPT

## 2023-01-26 PROCEDURE — 97110 THERAPEUTIC EXERCISES: CPT

## 2023-01-26 NOTE — PROGRESS NOTES
[facility] [place] [id number]  [address], [address] [phone] fax    Continued Plan of Care/ Re-certification for Physical Therapy Services    Patient name: Natanael Garza Start of Care:  2022   Referral source: Tim Palma : 1955   Medical/Treatment Diagnosis: Pain in left hip [M25.552]  Trochanteric bursitis, left hip [M70.62]  Pain in right hip [M25.551]  Payor: Robin Kerr / Plan: [address] / Product Type: Managed Care Medicare /  Onset Date:  ~2022      Prior Hospitalization: see medical history Provider#: [id number]   Medications: Verified on Patient Summary List    Comorbidities:  Arthritis, Breast CA, Hyperlipidemia, HTN, Osteopenia, DVT, PE, LBP/radiculopathy, GERD; bilat THRs  Prior Level of Function:  ambulation with SPC, Right LE for stairs, prefers sidelying for sleep, independent ADL and light household chores, driving and community ambulation without restriction due to left hip pain  Visits from Start of Care: 15    Missed Visits: 0    The Plan of Care and following information is based on the patient's current status:             Progress towards goals / Updated goals:  Improve FOTO by >/= 5 points. Recert:  44, was 55 at eval.  MET; 60  Increase left LE MMT scores by >/= 1/3 to 2/3 grades and minimal discomfort. Recert:  ER = 5-/5 bilat; IR = 4 to 4+/5 left with discomfort and 5/5 right without pain; Add = 4+/5 left, 5/5 right and OK bilat; Hip Abd = <2+/5 and painful at attempted SLR in right sidelying (Was 3+ Abd, 4 Add, 4, IR, and 4+ ER and discomfort at eval). PROGRESSING; B ER 5/5, left IR 4/5, left Add 5/5, left abd 2-/5  Ambulation in home safely with SPC and no/minimal lateral lurching.   Recert:  Continues to ambulate with left lateral lurching in left stance phase (was slowed becky, decreased left stance time/antalgic, decreased bilat step/stride lengths, lateral lurchingat eval). PROGRESSING; continues to use SPC with a Trendelenburg gait, but increased walking tolerance  Decrease max pain scale rating to  >/= 1-2/10. Recert:  7/05 max. PROGRESSING; 4/10 at max, but decreased average daily pain to 2/10 compared 3.5/10 at last reassessment. Key functional changes:    FOTO 60     MMT left hip ER 5/5  MMT left hip IR 4/5  MMT left hip Add 5/5  MMT left hip Abd 2-/5    Functional Gains: decreased stiffness, walking tolerance, stairs, sit to stands, getting in/out car and bath  Functional Deficits: prolonged walking tolerance, bending/lifting, a lot of housework  % improvement: 80%  Pain   Average: 2/10                  Best: 2/10                Worst: 4/10  Patient Goal: \"be able to lift my legs up and stronger\"         Problems/ barriers to goal attainment:  None    Problem List: pain affecting function, decrease strength, impaired gait/ balance, decrease ADL/ functional abilitiies, decrease activity tolerance, decrease flexibility/ joint mobility, and decrease transfer abilities    Treatment Plan: Therapeutic exercise, Neuromuscular reeducation, Manual therapy, Therapeutic activity, Self care/home management, Electric stim unattended , Gait training, Ultrasound, and Other: MHP/CP prn. Patient Goal (s) has been updated and includes: \"be able to lift my legs up and stronger\"     Goals for this certification period to be accomplished in 4-5 weeks:  Increase left LE MMT scores by >/= 1/3 to 2/3 grades and minimal discomfort. Recert:  PROGRESSING; B ER 5/5, left IR 4/5, left Add 5/5, left abd 2-/5 (Was 3+ Abd, 4 Add, 4, IR, and 4+ ER and discomfort at eval). 2.   Ambulation in home safely with SPC and no/minimal lateral lurching. (Was 3+ Abd, 4 Add, 4, IR, and 4+ ER and discomfort at eval).   Recert:  PROGRESSING; continues to use SPC with a Trendelenburg gait, but increased walking tolerance (was slowed becky, decreased left stance time/antalgic, decreased bilat step/stride lengths, lateral lurchingat eval). 3.   Decrease max pain scale rating to  >/= 1-2/10. Recert:  PROGRESSING; 5/30 at max, but decreased average daily pain to 2/10 compared 3.5/10 at last reassessment. Frequency / Duration: Patient to be seen 2 times per week for 4-5 weeks:    Assessment / Recommendations:  Ms. Tim Balderrama reports 80% improvement since beginning therapy. Pt has made progress with her strength, but still has some strength deficits especially in left glut med. She reports ease with walking, but continues to have difficulty with prolonged walking. Pt also continues to ambulate with a SPC and Trendelenburg gait. We will continue with PT to address her remaining functional deficits. Patient will continue to benefit from skilled PT services to modify and progress therapeutic interventions, address functional mobility deficits, address ROM deficits, address strength deficits, analyze and address soft tissue restrictions, analyze and cue movement patterns, analyze and modify body mechanics/ergonomics, assess and modify postural abnormalities, address imbalance/dizziness, and instruct in home and community integration to attain remaining goals. Certification Period: 01/27/2023 to 02/25/2023    Pedro Neal, PT 1/26/2023 1:02 PM    ________________________________________________________________________  I certify that the above Therapy Services are being furnished while the patient is under my care. I agree with the treatment plan and certify that this therapy is necessary. [] I have read the above and request that my patient continue as recommended.   [] I have read the above report and request that my patient continue therapy with the following changes/special instructions: _____________________________________________  [] I have read the above report and request that my patient be discharged from therapy    Physician's Signature:____________Date:_________TIME:________     Danise Cushing, PA-C  ** Signature, Date and Time must be completed for valid certification **    Please sign and return to [facility] [address]  [address], [address] [phone] fax

## 2023-01-26 NOTE — PROGRESS NOTES
PT DAILY TREATMENT NOTE     Patient Name: Law Reilly  Date:2023  : 1955  [x]  Patient  Verified  Payor: [employer] MEDICARE COMPLETE / Plan: [facility] MEDICARE COMPLETE / Product Type: Managed Care Medicare /    In time:1125  Out time:1210  Total Treatment Time (min): 45  Visit #: 8 of 10    Medicare/BCBS Only   Total Timed Codes (min):  45 1:1 Treatment Time:  45       Treatment Area: Pain in left hip [M25.552]  Trochanteric bursitis, left hip [M70.62]  Pain in right hip [M25.551]    SUBJECTIVE  Pain Level (0-10 scale): 2  Any medication changes, allergies to medications, adverse drug reactions, diagnosis change, or new procedure performed?: [x] No    [] Yes (see summary sheet for update)  Subjective functional status/changes:   [] No changes reported  \"I'm a little sore today. \"    OBJECTIVE    Modality rationale: Patient declined     Min Type Additional Details    [] Estim:  []Unatt       []IFC  []Premod                        []Other:  []w/ice   []w/heat  Position:  Location:    [] Estim: []Att    []TENS instruct  []NMES                    []Other:  []w/US   []w/ice   []w/heat  Position:  Location:    []  Traction: [] Cervical       []Lumbar                       [] Prone          []Supine                       []Intermittent   []Continuous Lbs:  [] before manual  [] after manual    []  Ultrasound: []Continuous   [] Pulsed                           []1MHz   []3MHz W/cm2:  Location:    []  Iontophoresis with dexamethasone         Location: [] Take home patch   [] In clinic    []  Ice     []  heat  []  Ice massage  []  Laser   []  Anodyne Position:  Location:    []  Laser with stim  []  Other:  Position:  Location:    []  Vasopneumatic Device    []  Right     []  Left  Pre-treatment girth:  Post-treatment girth:  Measured at (location):  Pressure:       [] lo [] med [] hi   Temperature: [] lo [] med [] hi   [] Skin assessment post-treatment:  []intact []redness- no adverse reaction []redness - adverse reaction:     10 min Therapeutic Exercise:  [x] See flow sheet :   Rationale: increase ROM and increase strength to improve the patients ability to perform ADLs    20 min Therapeutic Activity:  [x]  See flow sheet : FOTO, reassessment, functional standing activities    Rationale: increase ROM, increase strength, improve coordination, improve balance, and increase proprioception  to improve the patients ability to improve mobility and ADL performance     15 min Neuromuscular Re-education:  [x]  See flow sheet : hip/glut re-ed activities    Rationale: increase ROM, increase strength, improve coordination, improve balance, and increase proprioception  to improve the patients ability to improve mobility, stance stability, and gait         With   [x] TE   [x] TA   [x] neuro   [] other: Patient Education: [x] Review HEP    [] Progressed/Changed HEP based on:   [x] positioning   [x] body mechanics   [] transfers   [] heat/ice application    [] other:      Other Objective/Functional Measures:   FOTO 60    MMT left hip ER 5/5  MMT left hip IR 4/5  MMT left hip Add 5/5  MMT left hip Abd 2-/5     Pain Level (0-10 scale) post treatment: 0    ASSESSMENT/Changes in Function: Ms. Juliana Hayward reports 80% improvement since beginning therapy. Pt has made progress with her strength, but still has some strength deficits especially in left glut med. She reports ease with walking, but continues to have difficulty with prolonged walking. Pt also continues to ambulate with a SPC and Trendelenburg gait. We will continue with PT to address her remaining functional deficits.      Patient will continue to benefit from skilled PT services to modify and progress therapeutic interventions, address functional mobility deficits, address ROM deficits, address strength deficits, analyze and address soft tissue restrictions, analyze and cue movement patterns, analyze and modify body mechanics/ergonomics, assess and modify postural abnormalities, address imbalance/dizziness, and instruct in home and community integration to attain remaining goals. [x]  See Plan of Care  [x]  See progress note/recertification  []  See Discharge Summary         Progress towards goals / Updated goals:  Improve FOTO by >/= 5 points. Recert:  44, was 55 at eval.  MET; 60  Increase left LE MMT scores by >/= 1/3 to 2/3 grades and minimal discomfort. Recert:  ER = 5-/5 bilat; IR = 4 to 4+/5 left with discomfort and 5/5 right without pain; Add = 4+/5 left, 5/5 right and OK bilat; Hip Abd = <2+/5 and painful at attempted SLR in right sidelying (Was 3+ Abd, 4 Add, 4, IR, and 4+ ER and discomfort at eval). PROGRESSING; B ER 5/5, left IR 4/5, left Add 5/5, left abd 2-/5  Ambulation in home safely with SPC and no/minimal lateral lurching. Recert:  Continues to ambulate with left lateral lurching in left stance phase (was slowed becky, decreased left stance time/antalgic, decreased bilat step/stride lengths, lateral lurchingat eval). PROGRESSING; continues to use SPC with a Trendelenburg gait, but increased walking tolerance  Decrease max pain scale rating to  >/= 1-2/10. Recert:  6/79 max. PROGRESSING; 4/10 at max, but decreased average daily pain to 2/10 compared 3.5/10 at last reassessment.      Functional Gains: decreased stiffness, walking tolerance, stairs, sit to stands, getting in/out car and bath  Functional Deficits: prolonged walking tolerance, bending/lifting, a lot of housework  % improvement: 80%  Pain   Average: 2/10       Best: 2/10     Worst: 4/10  Patient Goal: \"be able to lift my legs up and stronger\"    PLAN  []  Upgrade activities as tolerated     [x]  Continue plan of care  []  Update interventions per flow sheet       []  Discharge due to:_  []  Other:_      David Barron, PTA, CSCS 1/26/2023  12:12 PM    Future Appointments   Date Time Provider Anastacia Shoemaker   1/26/2023 11:30 AM [facility] 1/31/2023 12:00 PM Trenda Hum, PT [facility]   2/2/2023 11:30 AM Trenda Hum, PT [facility]   2/7/2023 12:00 PM Trenda Hum, PT [facility]   2/9/2023 11:30 AM Real Wise, PTA [facility]   2/14/2023 11:30 AM Trenda Hum, PT [facility]   2/23/2023 11:30 AM Trenda Hum, PT [facility]

## 2023-01-31 ENCOUNTER — HOSPITAL ENCOUNTER (OUTPATIENT)
Dept: PHYSICAL THERAPY | Age: 68
Discharge: HOME | End: 2023-01-31
Payer: MEDICARE

## 2023-01-31 PROCEDURE — 97112 NEUROMUSCULAR REEDUCATION: CPT

## 2023-01-31 PROCEDURE — 97530 THERAPEUTIC ACTIVITIES: CPT

## 2023-01-31 NOTE — PROGRESS NOTES
PT DAILY TREATMENT NOTE     Patient Name: Lance Fernandes  Date:2023  : 1955  [x]  Patient  Verified  Payor: [employer] MEDICARE COMPLETE / Plan: [facility] MEDICARE COMPLETE / Product Type: Managed Care Medicare /    In time:1200  Out time:1242  Total Treatment Time (min): 42  Visit #: 1 of 8    Medicare/BCBS Only   Total Timed Codes (min):  42 1:1 Treatment Time:  30       Treatment Area: Pain in left hip [M25.552]  Trochanteric bursitis, left hip [M70.62]  Pain in right hip [M25.551]    SUBJECTIVE  Pain Level (0-10 scale): 0  Any medication changes, allergies to medications, adverse drug reactions, diagnosis change, or new procedure performed?: [x] No    [] Yes (see summary sheet for update)  Subjective functional status/changes:   [] No changes reported  \"No pain.  I'm just sore and stiff\"    OBJECTIVE    Modality rationale: Patient declined   Min Type Additional Details    [] Estim:  []Unatt       []IFC  []Premod                        []Other:  []w/ice   []w/heat  Position:  Location:    [] Estim: []Att    []TENS instruct  []NMES                    []Other:  []w/US   []w/ice   []w/heat  Position:  Location:    []  Traction: [] Cervical       []Lumbar                       [] Prone          []Supine                       []Intermittent   []Continuous Lbs:  [] before manual  [] after manual    []  Ultrasound: []Continuous   [] Pulsed                           []1MHz   []3MHz W/cm2:  Location:    []  Iontophoresis with dexamethasone         Location: [] Take home patch   [] In clinic    []  Ice     []  heat  []  Ice massage  []  Laser   []  Anodyne Position:  Location:    []  Laser with stim  []  Other:  Position:  Location:    []  Vasopneumatic Device    []  Right     []  Left  Pre-treatment girth:  Post-treatment girth:  Measured at (location):  Pressure:       [] lo [] med [] hi   Temperature: [] lo [] med [] hi   [] Skin assessment post-treatment:  []intact []redness- no adverse reaction []redness - adverse reaction:     10 min Therapeutic Exercise:  [x] See flow sheet :   Rationale: increase ROM and increase strength to improve the patients ability to perform ADLs    12 min Therapeutic Activity:  [x]  See flow sheet : functional standing activities, squatting mechanics, sit to stands   Rationale: increase ROM, increase strength, improve coordination, improve balance, and increase proprioception  to improve the patients ability to improve mobility and ADL performace     20 min Neuromuscular Re-education:  [x]  See flow sheet : hip/glut re-ed activities    Rationale: increase ROM, increase strength, improve coordination, improve balance, and increase proprioception  to improve the patients ability to improve mobility, stance stability, and gait         With   [x] TE   [x] TA   [x] neuro   [] other: Patient Education: [x] Review HEP    [] Progressed/Changed HEP based on:   [x] positioning   [x] body mechanics   [] transfers   [] heat/ice application    [] other:      Other Objective/Functional Measures:      Pain Level (0-10 scale) post treatment: 0    ASSESSMENT/Changes in Function: Pt continues to progress well with her mobility and gait, but still has strength deficits in glut med. Requires therapist assistance to complete sidelying hip abduction. Patient will continue to benefit from skilled PT services to modify and progress therapeutic interventions, address functional mobility deficits, address ROM deficits, address strength deficits, analyze and address soft tissue restrictions, analyze and cue movement patterns, analyze and modify body mechanics/ergonomics, assess and modify postural abnormalities, address imbalance/dizziness, and instruct in home and community integration to attain remaining goals. [x]  See Plan of Care  []  See progress note/recertification  []  See Discharge Summary         Progress towards goals / Updated goals:   Increase left LE MMT scores by >/= 1/3 to 2/3 grades and minimal discomfort. Recert:  PROGRESSING; B ER 5/5, left IR 4/5, left Add 5/5, left abd 2-/5 (Was 3+ Abd, 4 Add, 4, IR, and 4+ ER and discomfort at eval). 2.   Ambulation in home safely with SPC and no/minimal lateral lurching. (Was 3+ Abd, 4 Add, 4, IR, and 4+ ER and discomfort at eval). Recert:  PROGRESSING; continues to use SPC with a Trendelenburg gait, but increased walking tolerance (was slowed becky, decreased left stance time/antalgic, decreased bilat step/stride lengths, lateral lurchingat eval). 3.   Decrease max pain scale rating to  >/= 1-2/10. Recert:  PROGRESSING; 2/98 at max, but decreased average daily pain to 2/10 compared 3.5/10 at last reassessment.      PLAN  []  Upgrade activities as tolerated     [x]  Continue plan of care  []  Update interventions per flow sheet       []  Discharge due to:_  []  Other:_      Mirella Christiansen PTA, CSCS 1/31/2023  12:49 PM    Future Appointments   Date Time Provider Anastacia Middletoni   2/2/2023 11:30 AM Yvette Gonzalez, PT [facility]   2/7/2023 12:00 PM Yvette Gonzalez, PT [facility]   2/9/2023 11:30 AM Darya Mckeon PTA [facility]   2/14/2023 11:30 AM Yvette Gonzalez PT [facility]   2/23/2023 11:30 AM Yvette Gonzalez, PT [facility]

## 2023-02-02 ENCOUNTER — HOSPITAL ENCOUNTER (OUTPATIENT)
Dept: PHYSICAL THERAPY | Age: 68
Discharge: HOME | End: 2023-02-02
Payer: MEDICARE

## 2023-02-02 PROCEDURE — 97110 THERAPEUTIC EXERCISES: CPT

## 2023-02-02 PROCEDURE — 97530 THERAPEUTIC ACTIVITIES: CPT

## 2023-02-02 PROCEDURE — 97112 NEUROMUSCULAR REEDUCATION: CPT

## 2023-02-07 ENCOUNTER — HOSPITAL ENCOUNTER (OUTPATIENT)
Dept: PHYSICAL THERAPY | Age: 68
Discharge: HOME | End: 2023-02-07
Payer: MEDICARE

## 2023-02-07 PROCEDURE — 97112 NEUROMUSCULAR REEDUCATION: CPT

## 2023-02-07 PROCEDURE — 97530 THERAPEUTIC ACTIVITIES: CPT

## 2023-02-07 PROCEDURE — 97110 THERAPEUTIC EXERCISES: CPT

## 2023-02-07 NOTE — PROGRESS NOTES
PT DAILY TREATMENT NOTE     Patient Name: Pedro Ramirez  Date:2023  : 1955  [x]  Patient  Verified  Payor: [address] [place] [address] / Plan: [facility] MEDICARE COMPLETE / Product Type: Managed Care Medicare /    In time:12:01  Out time:12:45  Total Treatment Time (min): 44 min. Visit #: 3 of 8    Medicare/BCBS Only   Total Timed Codes (min):  44 1:1 Treatment Time:  40 min. Treatment Area: Pain in left hip [M25.552]  Trochanteric bursitis, left hip [M70.62]  Pain in right hip [M25.551]    SUBJECTIVE  Pain Level (0-10 scale):  0/10  Any medication changes, allergies to medications, adverse drug reactions, diagnosis change, or new procedure performed?: [x] No    [] Yes (see summary sheet for update)  Subjective functional status/changes:   [] No changes reported  Pt denies hip pain, but reports \"soreness\" at left lateral hip area.      OBJECTIVE            Modality rationale: Patient declined      Min Type Additional Details     [] Estim:  []Unatt       []IFC  []Premod                        []Other:  []w/ice   []w/heat  Position:  Location:     [] Estim: []Att    []TENS instruct  []NMES                    []Other:  []w/US   []w/ice   []w/heat  Position:  Location:     []  Traction: [] Cervical       []Lumbar                       [] Prone          []Supine                       []Intermittent   []Continuous Lbs:  [] before manual  [] after manual     []  Ultrasound: []Continuous   [] Pulsed                           []1MHz   []3MHz W/cm2:  Location:     []  Iontophoresis with dexamethasone         Location: [] Take home patch   [] In clinic     []  Ice     []  heat  []  Ice massage  []  Laser   []  Anodyne Position:  Location:     []  Laser with stim  []  Other:  Position:  Location:     []  Vasopneumatic Device    []  Right     []  Left  Pre-treatment girth:  Post-treatment girth:  Measured at (location):  Pressure:       [] lo [] med [] hi   Temperature: [] lo [] med [] hi   [] Skin assessment post-treatment:  []intact []redness- no adverse reaction    []redness - adverse reaction:      26 min Therapeutic Exercise:  [x] See flow sheet :   Rationale: increase ROM and increase strength to improve the patients ability to perform ADLs      8 min Therapeutic Activity:  [x]  See flow sheet : functional standing activities, sit to stands   Rationale: increase ROM, increase strength, improve coordination, improve balance, and increase proprioception  to improve the patients ability to improve mobility and ADL performance     10 min Neuromuscular Re-education:  [x]  See flow sheet : hip/glut re-ed activities    Rationale: increase ROM, increase strength, improve coordination, improve balance, and increase proprioception  to improve the patients ability to improve mobility, stance stability, and gait                                                     With   [x] TE   [x] TA   [x] neuro   [] other: Patient Education: [x] Review HEP    [] Progressed/Changed HEP based on:   [x] positioning   [x] body mechanics   [] transfers   [] heat/ice application    [] other:       Other Objective/Functional Measures:   Progressed activities as per flow sheet. Pain Level (0-10 scale) post treatment:  0/10     ASSESSMENT/Changes in Function:   Pt continues to progress with her LE strength, but observe right > left when one leg holding T-band against contralateral motion (hip ER and IR and Abd). Patient will continue to benefit from skilled PT services to modify and progress therapeutic interventions, address functional mobility deficits, address ROM deficits, address strength deficits, analyze and address soft tissue restrictions, analyze and cue movement patterns, analyze and modify body mechanics/ergonomics, assess and modify postural abnormalities, address imbalance/dizziness, and instruct in home and community integration to attain remaining goals.      [x]  See Plan of Care  []  See progress note/recertification  []  See Discharge Summary         Progress towards goals / Updated goals: Increase left LE MMT scores by >/= 1/3 to 2/3 grades and minimal discomfort. Recert:  PROGRESSING; B ER 5/5, left IR 4/5, left Add 5/5, left abd 2-/5 (Was 3+ Abd, 4 Add, 4, IR, and 4+ ER and discomfort at eval). Progressing with resistive LE exercises. , but left < right. 2.   Ambulation in home safely with SPC and no/minimal lateral lurching. (Was 3+ Abd, 4 Add, 4, IR, and 4+ ER and discomfort at eval). Recert:  PROGRESSING; continues to use SPC with a Trendelenburg gait, but increased walking tolerance (was slowed becky, decreased left stance time/antalgic, decreased bilat step/stride lengths, lateral lurchingat eval). Continues to have a Trendelenburg gait  3. Decrease max pain scale rating to  >/= 1-2/10. Recert:  PROGRESSING; 0/06 at max, but decreased average daily pain to 2/10 compared 3.5/10 at last reassessment. Pain continues to be at low levels, per patient report and observed behaviors.      PLAN  [x]  Upgrade activities as tolerated     [x]  Continue plan of care  []  Update interventions per flow sheet       []  Discharge due to:_  []  Other:_      Sharita Dueñas PT 2/7/2023  10:57 AM    Future Appointments   Date Time Provider Anastacia Shoemaker   2/7/2023 12:00 PM [facility] [address] [facility]   2/9/2023 11:30 AM Khalif Medellin PTA [facility]   2/14/2023 11:30 AM [facility]   2/23/2023 11:30 AM [facility]

## 2023-02-09 ENCOUNTER — HOSPITAL ENCOUNTER (OUTPATIENT)
Dept: PHYSICAL THERAPY | Age: 68
Discharge: HOME | End: 2023-02-09
Payer: MEDICARE

## 2023-02-09 PROCEDURE — 97112 NEUROMUSCULAR REEDUCATION: CPT

## 2023-02-09 PROCEDURE — 97530 THERAPEUTIC ACTIVITIES: CPT

## 2023-02-09 PROCEDURE — 97110 THERAPEUTIC EXERCISES: CPT

## 2023-02-09 NOTE — PROGRESS NOTES
PT DAILY TREATMENT NOTE     Patient Name: Madeline Pham  Date:2023  : 1955  [x]  Patient  Verified  Payor: [employer] MEDICARE COMPLETE / [facility] / Product Type: Managed Care Medicare /    In time:1133  Out time:1222  Total Treatment Time (min): 52  Visit #: 4 of 8    Medicare/BCBS Only   Total Timed Codes (min):  49 1:1 Treatment Time:  45       Treatment Area: Pain in left hip [M25.552]  Trochanteric bursitis, left hip [M70.62]  Pain in right hip [M25.551]    SUBJECTIVE  Pain Level (0-10 scale): 0  Any medication changes, allergies to medications, adverse drug reactions, diagnosis change, or new procedure performed?: [x] No    [] Yes (see summary sheet for update)  Subjective functional status/changes:   [] No changes reported  Patient reports she doesn't have any pain today, she's just sore.     OBJECTIVE    20 min Therapeutic Exercise:  [x] See flow sheet :   Rationale: increase ROM and increase strength to improve the patients ability to increase activity tolerance    15 min Therapeutic Activity:  [x]  See flow sheet : standing functional hip strength, step ups    Rationale: increase ROM, increase strength, and improve coordination  to improve the patients ability to improve standing and walking tolerance     14 min Neuromuscular Re-education:  [x]  See flow sheet : balance, hip re-ed   Rationale: increase strength, improve coordination, improve balance, and increase proprioception  to improve the patients ability to Increase stability in stance and with ambulation    With   [] TE   [] TA   [] neuro   [] other: Patient Education: [x] Review HEP    [] Progressed/Changed HEP based on:   [] positioning   [] body mechanics   [] transfers   [] heat/ice application    [] other:      Other Objective/Functional Measures: therapist assist with bent knee abd on left LE     Pain Level (0-10 scale) post treatment: 0    ASSESSMENT/Changes in Function: Patient is progressing well with pain control and functional mobility. Continues to have B hip flex weakness noted with SLR and using UE to place LEs on bike pedals. She needed assistance with left hip abduction with knee bent, able to perform without assist on right LE. Unable to perform IR clam in s/l initially; improved with bolster placed between knees. Patient will continue to benefit from skilled PT services to modify and progress therapeutic interventions, address functional mobility deficits, address ROM deficits, address strength deficits, analyze and address soft tissue restrictions, analyze and cue movement patterns, analyze and modify body mechanics/ergonomics, assess and modify postural abnormalities, address imbalance/dizziness, and instruct in home and community integration to attain remaining goals. []  See Plan of Care  []  See progress note/recertification  []  See Discharge Summary         Progress towards goals / Updated goals: Increase left LE MMT scores by >/= 1/3 to 2/3 grades and minimal discomfort. Recert:  PROGRESSING; B ER 5/5, left IR 4/5, left Add 5/5, left abd 2-/5 (Was 3+ Abd, 4 Add, 4, IR, and 4+ ER and discomfort at eval). Demonstrates hip flexion weakness with SLR and using UE to lift LEs onto bike  2. Ambulation in home safely with SPC and no/minimal lateral lurching. Recert:  PROGRESSING; continues to use SPC with a Trendelenburg gait, but increased walking tolerance (was slowed becky, decreased left stance time/antalgic, decreased bilat step/stride lengths, lateral lurchingat eval). Continues to have a Trendelenburg gait  3. Decrease max pain scale rating to  >/= 1-2/10. Recert:  PROGRESSING; 6/79 at max, but decreased average daily pain to 2/10 compared 3.5/10 at last reassessment.    MET: reports 0/10 pain over the last week    PLAN  [x]  Upgrade activities as tolerated     [x]  Continue plan of care  []  Update interventions per flow sheet       []  Discharge due to:_  [] Other:_      Joann Xochilt, PTA 2/9/2023  11:45 AM    Future Appointments   Date Time Provider Anastacia Shoemaker   2/14/2023 11:30 AM [facility]   2/23/2023 11:30 AM [facility]

## 2023-02-14 ENCOUNTER — HOSPITAL ENCOUNTER (OUTPATIENT)
Facility: HOSPITAL | Age: 68
Setting detail: RECURRING SERIES
Discharge: HOME | End: 2023-02-17
Payer: MEDICARE

## 2023-02-14 ENCOUNTER — APPOINTMENT (OUTPATIENT)
Dept: PHYSICAL THERAPY | Age: 68
End: 2023-02-14
Payer: MEDICARE

## 2023-02-14 PROCEDURE — 97530 THERAPEUTIC ACTIVITIES: CPT

## 2023-02-14 PROCEDURE — 97112 NEUROMUSCULAR REEDUCATION: CPT

## 2023-02-14 PROCEDURE — 97110 THERAPEUTIC EXERCISES: CPT

## 2023-02-14 NOTE — PROGRESS NOTES
PHYSICAL / OCCUPATIONAL THERAPY - DAILY TREATMENT NOTE (updated )    Patient Name: Tracy Hunt    Date: 2023    : 1955  Insurance: Payor: [facility] MEDICARE / Plan: [place] / Product Type: *No Product type* /      Patient  verified Yes     Visit #   Current / Total 5 8   Time   In / Out 1132 1226   Pain   In / Out 0 0   Subjective Functional Status/Changes: \"I'm just sore. \"   Changes to:  Meds, Allergies, Med Hx, Sx Hx? If yes, update Summary List no       TREATMENT AREA =  Pain in left hip [M25.552]    OBJECTIVE    Modalities Rationale:     decrease pain to improve patient's ability to progress to PLOF and address remaining functional goals. min [] Estim Unattended, type/location:                                      []  w/ice    []  w/heat    min [] Estim Attended, type/location:                                     []  w/US     []  w/ice    []  w/heat    []  TENS insruct      min []  Mechanical Traction: type/lbs                   []  pro   []  sup   []  int   []  cont    []  before manual    []  after manual    min []  Ultrasound, settings/location:      min []  Iontophoresis w/ dexamethasone, location:                                               []  take home patch       []  in clinic   10 min  unbilled []  Ice     [x]  Heat    location/position: Left hip/supine with wedge     min []  Paraffin,  details:     min []  Vasopneumatic Device, press/temp:     min []  Delmi Kida / Kelle Erichsen: If using vaso (only need to measure limb vaso being performed on)      pre-treatment girth :       post-treatment girth :       measured at (landmark location) :      min []  Other:    Skin assessment post-treatment (if applicable):    [x]  intact    [x]  redness- no adverse reaction                 []redness - adverse reaction:         Therapeutic Procedures:   Tx Min Billable or 1:1 Min (if diff from Tx Min) Procedure, Rationale, Specifics    66789 Therapeutic Exercise (timed): increase ROM, strength, coordination, balance, and proprioception to improve patient's ability to progress to PLOF and address remaining functional goals. (see flow sheet as applicable)     Details if applicable:       15 15 56142 Neuromuscular Re-Education (timed):  improve balance, coordination, kinesthetic sense, posture, core stability and proprioception to improve patient's ability to develop conscious control of individual muscles and awareness of position of extremities in order to progress to PLOF and address remaining functional goals. (see flow sheet as applicable)     Details if applicable:     15 15 20732 Therapeutic Activity (timed):  use of dynamic activities replicating functional movements to increase ROM, strength, coordination, balance, and proprioception in order to improve patient's ability to progress to PLOF and address remaining functional goals. (see flow sheet as applicable)     Details if applicable:            Details if applicable:            Details if applicable:     40 44 [facility] Totals Reminder: bill using total billable min of TIMED therapeutic procedures (example: do not include dry needle or estim unattended, both untimed codes, in totals to left)  8-22 min = 1 unit; 23-37 min = 2 units; 38-52 min = 3 units; 53-67 min = 4 units; 68-82 min = 5 units   Total Total     [x]  Patient Education billed concurrently with other procedures   [x] Review HEP    [] Progressed/Changed HEP, detail:    [] Other detail:       Objective Information/Functional Measures/Assessment    Pt continues to progress with her functional mobility and pain, but still has overall hip and glut weakness.  Requires therapist assist to complete sidelying hip abduction with knee bent and with bolster support IR to neutral.     Patient will continue to benefit from skilled PT / OT services to modify and progress therapeutic interventions, analyze and address functional mobility deficits, analyze and address ROM deficits, analyze and address strength deficits, analyze and address soft tissue restrictions, analyze and cue for proper movement patterns, analyze and modify for postural abnormalities, analyze and address imbalance/dizziness, and instruct in home and community integration to address functional deficits and attain remaining goals. Progress toward goals / Updated goals:  []  See Progress Note/Recertification    Increase left LE MMT scores by >/= 1/3 to 2/3 grades and minimal discomfort. Recert:  PROGRESSING; B ER 5/5, left IR 4/5, left Add 5/5, left abd 2-/5 (Was 3+ Abd, 4 Add, 4, IR, and 4+ ER and discomfort at eval). Demonstrates hip flexion weakness with SLR and using UE to lift LEs onto bike  2. Ambulation in home safely with SPC and no/minimal lateral lurching. Recert:  PROGRESSING; continues to use SPC with a Trendelenburg gait, but increased walking tolerance (was slowed ashley, decreased left stance time/antalgic, decreased bilat step/stride lengths, lateral lurchingat eval). Continues to have a Trendelenburg gait  3. Decrease max pain scale rating to  >/= 1-2/10. Recert:  PROGRESSING; 5/04 at max, but decreased average daily pain to 2/10 compared 3.5/10 at last reassessment.    MET: reports 0/10 pain over the last week    PLAN  Yes  Continue plan of care  []  Upgrade activities as tolerated  []  Discharge due to :  []  Other:    Yannick Sher PTA, CSCS    2/14/2023    11:36 AM    Future Appointments   Date Time Provider Javier Ford   2/16/2023 12:00 PM Clare Buckner PTA [facility]   2/23/2023 11:30 AM Yannick Sher, KAYLA Carter

## 2023-02-16 ENCOUNTER — HOSPITAL ENCOUNTER (OUTPATIENT)
Facility: HOSPITAL | Age: 68
Setting detail: RECURRING SERIES
Discharge: HOME | End: 2023-02-19
Payer: MEDICARE

## 2023-02-16 PROCEDURE — 97530 THERAPEUTIC ACTIVITIES: CPT

## 2023-02-16 PROCEDURE — 97110 THERAPEUTIC EXERCISES: CPT

## 2023-02-16 PROCEDURE — 97112 NEUROMUSCULAR REEDUCATION: CPT

## 2023-02-16 NOTE — PROGRESS NOTES
PHYSICAL / OCCUPATIONAL THERAPY - DAILY TREATMENT NOTE (updated )    Patient Name: Carol Leon    Date: 2023    : 1955  Insurance: Payor: [address] / Plan: [place] / Product Type: *No Product type* /      Patient  verified Yes     Visit #   Current / Total 6 8   Time   In / Out 1200 1248   Pain   In / Out 0 0   Subjective Functional Status/Changes: Pt reports no significant changes or pain, \"just achiness\"   Changes to:  Meds, Allergies, Med Hx, Sx Hx? If yes, update Summary List no       TREATMENT AREA =  Pain in left hip [M25.552]    OBJECTIVE         Therapeutic Procedures: Tx Min Billable or 1:1 Min (if diff from Tx Min) Procedure, Rationale, Specifics   10 10 66826 Therapeutic Exercise (timed):  increase ROM, strength, coordination, balance, and proprioception to improve patient's ability to progress to PLOF and address remaining functional goals. (see flow sheet as applicable)     Details if applicable:       18 10 76582 Neuromuscular Re-Education (timed):  improve balance, coordination, kinesthetic sense, posture, core stability and proprioception to improve patient's ability to develop conscious control of individual muscles and awareness of position of extremities in order to progress to PLOF and address remaining functional goals. (see flow sheet as applicable)     Details if applicable:  glut bela, balance    79907 Therapeutic Activity (timed):  use of dynamic activities replicating functional movements to increase ROM, strength, coordination, balance, and proprioception in order to improve patient's ability to progress to PLOF and address remaining functional goals.   (see flow sheet as applicable)     Details if applicable:     48 45 [address] [facility] [address] Reminder: bill using total billable min of TIMED therapeutic procedures (example: do not include dry needle or estim unattended, both untimed codes, in totals to left)  8-22 min = 1 unit; 23-37 min = 2 units; 38-52 min = 3 units; 53-67 min = 4 units; 68-82 min = 5 units   Total Total     [x]  Patient Education billed concurrently with other procedures   [x] Review HEP    [] Progressed/Changed HEP, detail:    [] Other detail:       Objective Information/Functional Measures/Assessment    Pt continues to have significant weakness in the lateral hips bilaterally left > right. Pt requires mod-max A with hip IR and abduction on the left and min on the right. Pt was able to reduce trendelenburg with lateral sliding vs walks in the parallel bars and with gait training to increase hip extension in terminal stance. Patient will continue to benefit from skilled PT / OT services to modify and progress therapeutic interventions, analyze and address functional mobility deficits, analyze and address ROM deficits, analyze and address strength deficits, analyze and address soft tissue restrictions, analyze and cue for proper movement patterns, analyze and modify for postural abnormalities, analyze and address imbalance/dizziness, and instruct in home and community integration to address functional deficits and attain remaining goals. Progress toward goals / Updated goals:  []  See Progress Note/Recertification    Increase left LE MMT scores by >/= 1/3 to 2/3 grades and minimal discomfort. Recert:  PROGRESSING; B ER 5/5, left IR 4/5, left Add 5/5, left abd 2-/5 (Was 3+ Abd, 4 Add, 4, IR, and 4+ ER and discomfort at eval). Continued weakness as noted by significant bilateral trendelenburg gait  2. Ambulation in home safely with SPC and no/minimal lateral lurching. Recert:  PROGRESSING; continues to use SPC with a Trendelenburg gait, but increased walking tolerance (was slowed ashley, decreased left stance time/antalgic, decreased bilat step/stride lengths, lateral lurchingat eval). Improved with gait training today, but continues to have significant trendelenburg gait  3. Decrease max pain scale rating to  >/= 1-2/10. Recert:  PROGRESSING; 0/31 at max, but decreased average daily pain to 2/10 compared 3.5/10 at last reassessment.    MET: reports 0/10 pain over the last week       PLAN  Yes  Continue plan of care  []  Upgrade activities as tolerated  []  Discharge due to :  []  Other:    Derrick Gabriel, PT    2/16/2023    1:52 PM    Future Appointments   Date Time Provider Javier Ford   2/23/2023 11:30 AM Lina Mcclain, PT [facility] [address]

## 2023-02-23 ENCOUNTER — APPOINTMENT (OUTPATIENT)
Dept: PHYSICAL THERAPY | Age: 68
End: 2023-02-23
Payer: MEDICARE

## 2023-02-23 ENCOUNTER — HOSPITAL ENCOUNTER (OUTPATIENT)
Facility: HOSPITAL | Age: 68
Setting detail: RECURRING SERIES
Discharge: HOME | End: 2023-02-26
Payer: MEDICARE

## 2023-02-23 PROCEDURE — 97530 THERAPEUTIC ACTIVITIES: CPT

## 2023-02-23 PROCEDURE — 97110 THERAPEUTIC EXERCISES: CPT

## 2023-02-23 PROCEDURE — 97112 NEUROMUSCULAR REEDUCATION: CPT

## 2023-02-23 NOTE — PROGRESS NOTES
PHYSICAL / OCCUPATIONAL THERAPY - DAILY TREATMENT NOTE (updated )    Patient Name: Junior Stover    Date: 2023    : 1955  Insurance: Payor: [address] / Plan: [facility] MEDICARE COMPLETE / Product Type: *No Product type* /      Patient  verified Yes     Visit #   Current / Total 7 8   Time   In / Out 1130 1208   Pain   In / Out 0 0   Subjective Functional Status/Changes: \"I'm ready to finish up. \"   Changes to:  Meds, Allergies, Med Hx, Sx Hx? If yes, update Summary List no       TREATMENT AREA =  Pain in left hip [M25.552]    OBJECTIVE         Therapeutic Procedures: Tx Min Billable or 1:1 Min (if diff from Tx Min) Procedure, Rationale, Specifics   10 10 12591 Therapeutic Exercise (timed):  increase ROM, strength, coordination, balance, and proprioception to improve patient's ability to progress to PLOF and address remaining functional goals. (see flow sheet as applicable)     Details if applicable:       10 10 75479 Neuromuscular Re-Education (timed):  improve balance, coordination, kinesthetic sense, posture, core stability and proprioception to improve patient's ability to develop conscious control of individual muscles and awareness of position of extremities in order to progress to PLOF and address remaining functional goals. (see flow sheet as applicable)     Details if applicable:      74878 Therapeutic Activity (timed):  use of dynamic activities replicating functional movements to increase ROM, strength, coordination, balance, and proprioception in order to improve patient's ability to progress to PLOF and address remaining functional goals.   (see flow sheet as applicable)     Details if applicable:            Details if applicable:            Details if applicable:     41 43 [facility] Totals Reminder: bill using total billable min of TIMED therapeutic procedures (example: do not include dry needle or estim unattended, both untimed codes, in totals to left)  8-22 min = 1 unit; 23-37 min = 2 units; 38-52 min = 3 units; 53-67 min = 4 units; 68-82 min = 5 units   Total Total     [x]  Patient Education billed concurrently with other procedures   [x] Review HEP    [] Progressed/Changed HEP, detail:    [] Other detail:       Objective Information/Functional Measures/Assessment    FOTO 65    MMT left hip IR 5/5  MMT left hip abduction 2+/5      Ms. Ronnie Cee reports 80% improvement since beginning PT. Pt has progressed well recently with pain relief and overall functional mobility, but still lacking left hip strength. She continues to have a Trendelenburg gait with and without SPC. She feels that she can continue to strengthen at home with an updated HEP and follow up with MD as needed. Should she feel the need to return at a later date we would be happy to perform a new evaluation following new referral. We will discharge to an updated HEP at this time as pt feels she can self-manage her care. Progress toward goals / Updated goals:  []  See Progress Note/Recertification    Increase left LE MMT scores by >/= 1/3 to 2/3 grades and minimal discomfort. Recert:  PROGRESSING; B ER 5/5, left IR 4/5, left Add 5/5, left abd 2-/5 (Was 3+ Abd, 4 Add, 4, IR, and 4+ ER and discomfort at eval). NOT MET, but progressing; 5/5 with IR, 2+/5 with abduction  2. Ambulation in home safely with SPC and no/minimal lateral lurching. Recert:  PROGRESSING; continues to use SPC with a Trendelenburg gait, but increased walking tolerance (was slowed ashley, decreased left stance time/antalgic, decreased bilat step/stride lengths, lateral lurchingat eval). PARTIALLY MET; pt ambulates with SPC for long distances and no AD for short distances, but continues to ambulate with a Trendelenburg gait. 3.   Decrease max pain scale rating to  >/= 1-2/10. Recert:  PROGRESSING; 4/21 at max, but decreased average daily pain to 2/10 compared 3.5/10 at last reassessment.    MET: reports 0/10 pain over the last week    Functional Gains: walking/standing tolerance, pain, sit to stands, ADLs  Functional Deficits: full left hip strength, walking long periods  % improvement: 80%  Pain   Average: 2/10       Best: 1/10     Worst: 5/10  Patient Goal: \"continue with my exercises and keep moving\"      PLAN  No  Continue plan of care  []  Upgrade activities as tolerated  [x]  Discharge due to : SELF  []  Other:    Danie Carrasco PTA, [facility]    2/23/2023    11:26 AM    Future Appointments   Date Time Provider Javier Ford   2/23/2023 11:30 AM Danie Carrasco PT [facility]

## 2023-02-23 NOTE — PROGRESS NOTES
Physical Therapy Discharge Instructions      [facility] - [place] [address]  [address], [address] [phone] fax      Patient: Salvador Castorena  : 1955      Continue Home Exercise Program 1-2 times per day       Continue with    [x] Ice  as needed      [] Heat           Follow up with MD:     [] Upon completion of therapy     [x] As needed      Recommendations:     [x]   Return to activity with home program    []   Return to activity with the following modifications:       []Post Rehab Program    []Join Independent aquatic program     []Return to/join local gym        Additional Comments: Keep up the good work.           Danie Carrasco, PTA, CSCS  2023 12:00 PM

## 2023-03-02 NOTE — PROGRESS NOTES
[facility] - [facility] [address]  [address] [facility] [address], [address] [phone] fax    DISCHARGE SUMMARY  Patient Name: Shashi Gary : 1955   Treatment/Medical Diagnosis: Pain in left hip [M25.552]   Referral Source: Josee Almonte PA-C     Date of Initial Visit: 22 Attended Visits: 22 Missed Visits: 0     SUMMARY OF TREATMENT  Ms. Elvia Gamble reports 80% improvement since beginning PT. Pt has progressed well recently with pain relief and overall functional mobility, but still lacking left hip strength. She continues to have a Trendelenburg gait with and without SPC. She feels that she can continue to strengthen at home with an updated HEP and follow up with MD as needed. Should she feel the need to return at a later date we would be happy to perform a new evaluation following new referral. We will discharge to an updated HEP at this time as pt feels she can self-manage her care. CURRENT STATUS  Increase left LE MMT scores by >/= 1/3 to 2/3 grades and minimal discomfort. Recert:  PROGRESSING; B ER 5/5, left IR 4/5, left Add 5/5, left abd 2-/5   NOT MET, but progressing; 5/5 with IR, 2+/5 with abduction  2. Ambulation in home safely with SPC and no/minimal lateral lurching. Recert:  PROGRESSING; continues to use SPC with a Trendelenburg gait, but increased walking tolerance (was slowed ashley, decreased left stance time/antalgic, decreased bilat step/stride lengths, lateral lurchingat eval). PARTIALLY MET; pt ambulates with SPC for long distances and no AD for short distances, but continues to ambulate with a Trendelenburg gait. 3.   Decrease max pain scale rating to  >/= 1-2/10. Recert:  PROGRESSING; 9/30 at max, but decreased average daily pain to 2/10 compared 3.5/10 at last reassessment.    MET: reports 0/10 pain over the last week     Functional Gains: walking/standing tolerance, pain, sit to stands, ADLs  Functional Deficits: full left hip strength, walking long periods  % improvement: 80%  Pain   Average: 2/10                  Best: 1/10                Worst: 5/10  Patient Goal: \"continue with my exercises and keep moving\"      RECOMMENDATIONS  Pt would like to DC to self management of care with HEP     If you have any questions/comments please contact us directly at [phone]. Thank you for allowing us to assist in the care of your patient.   PTA signature  Jerry Jeffrey PT     Therapist Signature: Jerry Jeffrey PT Date: 3/2/23   Reporting Period: 1/26/23-2/23/23 Time: 9:24 AM

## 2023-05-10 RX ORDER — BETAMETHASONE SODIUM PHOSPHATE AND BETAMETHASONE ACETATE 3; 3 MG/ML; MG/ML
12 INJECTION, SUSPENSION INTRA-ARTICULAR; INTRALESIONAL; INTRAMUSCULAR; SOFT TISSUE ONCE
Status: CANCELLED | OUTPATIENT
Start: 2023-05-10 | End: 2023-05-10

## 2023-05-11 ENCOUNTER — OFFICE VISIT (OUTPATIENT)
Age: 68
End: 2023-05-11

## 2023-05-11 VITALS
OXYGEN SATURATION: 97 % | HEIGHT: 68 IN | TEMPERATURE: 97.7 F | HEART RATE: 87 BPM | BODY MASS INDEX: 33.16 KG/M2 | WEIGHT: 218.8 LBS

## 2023-05-11 DIAGNOSIS — M25.552 PAIN IN LEFT HIP: Primary | ICD-10-CM

## 2023-05-11 DIAGNOSIS — M70.62 TROCHANTERIC BURSITIS, LEFT HIP: ICD-10-CM

## 2023-05-11 DIAGNOSIS — M70.61 TROCHANTERIC BURSITIS, RIGHT HIP: ICD-10-CM

## 2023-05-11 RX ORDER — BETAMETHASONE SODIUM PHOSPHATE AND BETAMETHASONE ACETATE 3; 3 MG/ML; MG/ML
6 INJECTION, SUSPENSION INTRA-ARTICULAR; INTRALESIONAL; INTRAMUSCULAR; SOFT TISSUE ONCE
Status: COMPLETED | OUTPATIENT
Start: 2023-05-11 | End: 2023-05-11

## 2023-05-11 RX ORDER — DICLOFENAC EPOLAMINE 0.01 G/1
1 SYSTEM TOPICAL 2 TIMES DAILY
Qty: 60 PATCH | Refills: 2 | Status: SHIPPED | OUTPATIENT
Start: 2023-05-11

## 2023-05-11 RX ADMIN — BETAMETHASONE SODIUM PHOSPHATE AND BETAMETHASONE ACETATE 6 MG: 3; 3 INJECTION, SUSPENSION INTRA-ARTICULAR; INTRALESIONAL; INTRAMUSCULAR; SOFT TISSUE at 09:16

## 2023-05-11 NOTE — PROGRESS NOTES
Patient: Mary Matthew                MRN: [id number]       SSN: [id number]  YOB: 1955        AGE: 75 y.o. SEX: female  Body mass index is 33.27 kg/m². PCP: KEVIN Estrella  05/11/23            REVIEW OF SYSTEMS:  Constitutional: Negative for fever, chills, weight loss and malaise/fatigue. HENT: Negative. Eyes: Negative. Respiratory: Negative. Cardiovascular: Negative. Gastrointestinal: No bowel incontinence or constipation. Genitourinary: No bladder incontinence or saddle anesthesia. Skin: Negative. Neurological: Negative. Endo/Heme/Allergies: Negative. Psychiatric/Behavioral: Negative. Musculoskeletal: As per HPI above.      Past Medical History:   Diagnosis Date    Arthritis     Breast cancer ([facility]     Left    Cancer ([facility] 2001    Lobular situ-Left breast    Chronic pain     DVT (deep venous thrombosis) (HCC)     GERD (gastroesophageal reflux disease)     Hx of radiation therapy     Hyperlipidemia     Hypertension     no meds    Low back pain potentially associated with radiculopathy     Menopause     Osteopenia     mild    Pulmonary embolism (HCC)     Radiation therapy complication     Left breast    S/P hip replacement     Right hip    Trochanteric bursitis of right hip     Wears glasses          Current Outpatient Medications:     acetaminophen (TYLENOL) 500 MG tablet, Take 500 mg by mouth every 6 hours as needed, Disp: , Rfl:     albuterol sulfate HFA (PROVENTIL;VENTOLIN;PROAIR) 108 (90 Base) MCG/ACT inhaler, Inhale 1-2 puffs into the lungs every 4 hours as needed, Disp: , Rfl:     atorvastatin (LIPITOR) 20 MG tablet, Take 20 mg by mouth, Disp: , Rfl:     cetirizine (ZYRTEC) 10 MG tablet, Take 10 mg by mouth daily, Disp: , Rfl:     ferrous sulfate (IRON 325) 325 (65 Fe) MG tablet, Take 325 mg by mouth 2 times daily (with meals), Disp: , Rfl:     guaiFENesin 1200 MG TB12, Take 1,200 mg by mouth as needed, Disp: , Rfl:     HYDROcodone-acetaminophen

## 2023-05-17 ENCOUNTER — HOSPITAL ENCOUNTER (OUTPATIENT)
Facility: HOSPITAL | Age: 68
Discharge: HOME | End: 2023-05-20
Payer: MEDICARE

## 2023-05-17 DIAGNOSIS — Z12.31 ENCOUNTER FOR SCREENING MAMMOGRAM FOR BREAST CANCER: ICD-10-CM

## 2023-05-17 PROCEDURE — 77063 BREAST TOMOSYNTHESIS BI: CPT

## 2023-05-24 ENCOUNTER — TELEPHONE (OUTPATIENT)
Age: 68
End: 2023-05-24
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
## 2023-06-02 ENCOUNTER — HOSPITAL ENCOUNTER (OUTPATIENT)
Facility: HOSPITAL | Age: 68
End: 2023-06-02
Payer: MEDICARE

## 2023-06-02 ENCOUNTER — HOSPITAL ENCOUNTER (OUTPATIENT)
Facility: HOSPITAL | Age: 68
Discharge: HOME | End: 2023-06-05

## 2023-06-02 DIAGNOSIS — R10.13 EPIGASTRIC PAIN: ICD-10-CM

## 2023-06-02 LAB — LABCORP SPECIMEN COLLECTION: NORMAL

## 2023-06-02 PROCEDURE — 76705 ECHO EXAM OF ABDOMEN: CPT

## 2023-11-16 ENCOUNTER — OFFICE VISIT (OUTPATIENT)
Age: 68
End: 2023-11-16

## 2023-11-16 VITALS — BODY MASS INDEX: 32.43 KG/M2 | HEIGHT: 68 IN | WEIGHT: 214 LBS

## 2023-11-16 DIAGNOSIS — M70.61 TROCHANTERIC BURSITIS, RIGHT HIP: ICD-10-CM

## 2023-11-16 DIAGNOSIS — M70.62 TROCHANTERIC BURSITIS, LEFT HIP: Primary | ICD-10-CM

## 2023-11-16 RX ORDER — BETAMETHASONE SODIUM PHOSPHATE AND BETAMETHASONE ACETATE 3; 3 MG/ML; MG/ML
6 INJECTION, SUSPENSION INTRA-ARTICULAR; INTRALESIONAL; INTRAMUSCULAR; SOFT TISSUE ONCE
Status: COMPLETED | OUTPATIENT
Start: 2023-11-16 | End: 2023-11-16

## 2023-11-16 RX ORDER — TRAMADOL HYDROCHLORIDE 50 MG/1
50 TABLET ORAL EVERY 6 HOURS PRN
Qty: 28 TABLET | Refills: 0 | Status: SHIPPED | OUTPATIENT
Start: 2023-11-16 | End: 2023-11-23

## 2023-11-16 RX ORDER — LIDOCAINE 50 MG/G
1 PATCH TOPICAL DAILY
Qty: 30 PATCH | Refills: 0 | Status: SHIPPED | OUTPATIENT
Start: 2023-11-16 | End: 2023-12-16

## 2023-11-16 RX ADMIN — BETAMETHASONE SODIUM PHOSPHATE AND BETAMETHASONE ACETATE 6 MG: 3; 3 INJECTION, SUSPENSION INTRA-ARTICULAR; INTRALESIONAL; INTRAMUSCULAR; SOFT TISSUE at 09:27

## 2023-11-16 NOTE — PROGRESS NOTES
She does have lateral-based discomfort of each of her hips. She has had hip replacements in the past and very well with that. She had no troubles the wound. She had no fevers or chills. No injuries. She denies any start up pain. No feelings of instability. Patient denies recent fevers, chills, chest pain, SOB, or injuries. No recent systemic changes noted. A 12-point review of systems is performed today. Pertinent positives are noted. All other systems reviewed and otherwise are negative. Physical exam: General: Alert and oriented x3, nad.  well-developed, well nourished. normal affect, AF. NC/AT, EOMI, neck supple, trachea midline, no JVD present. Breathing is non-labored. Examination of the lower extremities reveals pain-free range of motion the hips. She does have discomfort palpation the trochanter bursa bilaterally. The wounds are healed nicely without erythema or ecchymosis noted there is no underlying fluctuance. No signs for infection or cellulitis present. Negative straight leg raise. Negative calf tenderness. Negative Homans' sign no signs of DVT present. Abduction strength is symmetric bilaterally. Leg lengths are perfect. Assessment: #1 status post bilateral hip replacements, #2 bilateral hip trochanter bursitis    Plan: At this point, we discussed treatment options. We will move forth a cortisone injection for each of her hips, trochanteric bursa. After informed consent, under aseptic conditions, with US guided assitance, each hip was prepped with betadine and a mxiture of 3ml 1% lidocaine and 6mg of celestone was injected without complications. The patient tolerated the injection well. The patient is instructed on post-injection care. A prescription for tramadol provided. She is instructed in use and precautions. We will also place her on Lidoderm patches. She is instructed on use and precautions.   We will see her back in 3 to 6 months time for reevaluation plan

## 2024-05-16 ENCOUNTER — OFFICE VISIT (OUTPATIENT)
Age: 69
End: 2024-05-16
Payer: MEDICARE

## 2024-05-16 DIAGNOSIS — Z96.643 PRESENCE OF ARTIFICIAL HIP JOINT, BILATERAL: ICD-10-CM

## 2024-05-16 DIAGNOSIS — M25.551 PAIN IN RIGHT HIP: ICD-10-CM

## 2024-05-16 DIAGNOSIS — M70.61 TROCHANTERIC BURSITIS, RIGHT HIP: ICD-10-CM

## 2024-05-16 DIAGNOSIS — M70.62 TROCHANTERIC BURSITIS, LEFT HIP: Primary | ICD-10-CM

## 2024-05-16 PROCEDURE — 99213 OFFICE O/P EST LOW 20 MIN: CPT | Performed by: PHYSICIAN ASSISTANT

## 2024-05-16 PROCEDURE — 20611 DRAIN/INJ JOINT/BURSA W/US: CPT | Performed by: PHYSICIAN ASSISTANT

## 2024-05-16 PROCEDURE — 73521 X-RAY EXAM HIPS BI 2 VIEWS: CPT | Performed by: PHYSICIAN ASSISTANT

## 2024-05-16 PROCEDURE — 1123F ACP DISCUSS/DSCN MKR DOCD: CPT | Performed by: PHYSICIAN ASSISTANT

## 2024-05-16 RX ORDER — LIDOCAINE HYDROCHLORIDE 10 MG/ML
3 INJECTION, SOLUTION INFILTRATION; PERINEURAL ONCE
Status: COMPLETED | OUTPATIENT
Start: 2024-05-16 | End: 2024-05-16

## 2024-05-16 RX ORDER — LIDOCAINE HYDROCHLORIDE 10 MG/ML
3 INJECTION, SOLUTION INFILTRATION; PERINEURAL ONCE
Status: SHIPPED | OUTPATIENT
Start: 2024-05-16

## 2024-05-16 RX ORDER — BETAMETHASONE SODIUM PHOSPHATE AND BETAMETHASONE ACETATE 3; 3 MG/ML; MG/ML
6 INJECTION, SUSPENSION INTRA-ARTICULAR; INTRALESIONAL; INTRAMUSCULAR; SOFT TISSUE ONCE
Status: COMPLETED | OUTPATIENT
Start: 2024-05-16 | End: 2024-05-16

## 2024-05-16 RX ORDER — BETAMETHASONE SODIUM PHOSPHATE AND BETAMETHASONE ACETATE 3; 3 MG/ML; MG/ML
6 INJECTION, SUSPENSION INTRA-ARTICULAR; INTRALESIONAL; INTRAMUSCULAR; SOFT TISSUE ONCE
Status: SHIPPED | OUTPATIENT
Start: 2024-05-16

## 2024-05-16 RX ADMIN — BETAMETHASONE SODIUM PHOSPHATE AND BETAMETHASONE ACETATE 6 MG: 3; 3 INJECTION, SUSPENSION INTRA-ARTICULAR; INTRALESIONAL; INTRAMUSCULAR; SOFT TISSUE at 10:41

## 2024-05-16 RX ADMIN — LIDOCAINE HYDROCHLORIDE 3 ML: 10 INJECTION, SOLUTION INFILTRATION; PERINEURAL at 10:42

## 2024-05-16 NOTE — PROGRESS NOTES
Patient: Edie Curiel                MRN: [id number]       SSN: [id number]  YOB: 1955        AGE: 68 y.o.        SEX: female  There is no height or weight on file to calculate BMI.    PCP: Trang Norton PA  05/16/24            REVIEW OF SYSTEMS:  Constitutional: Negative for fever, chills, weight loss and malaise/fatigue.   HENT: Negative.    Eyes: Negative.    Respiratory: Negative.   Cardiovascular: Negative.   Gastrointestinal: No bowel incontinence or constipation.  Genitourinary: No bladder incontinence or saddle anesthesia.  Skin: Negative.   Neurological: Negative.    Endo/Heme/Allergies: Negative.    Psychiatric/Behavioral: Negative.  Musculoskeletal: As per HPI above.     Past Medical History:   Diagnosis Date    Arthritis     Breast cancer (HCC)     Left    Cancer (HCC) 2001    Lobular situ-Left breast    Chronic pain     DVT (deep venous thrombosis) (HCC)     GERD (gastroesophageal reflux disease)     Hx of radiation therapy     Hyperlipidemia     Hypertension     no meds    Low back pain potentially associated with radiculopathy     Menopause     Osteopenia     mild    Pulmonary embolism (HCC)     Radiation therapy complication     Left breast    S/P hip replacement     Right hip    Trochanteric bursitis of right hip     Wears glasses          Current Outpatient Medications:     diclofenac (FLECTOR) 1.3 % PTCH patch, Place 1 patch onto the skin 2 times daily, Disp: 60 patch, Rfl: 2    acetaminophen (TYLENOL) 500 MG tablet, Take 500 mg by mouth every 6 hours as needed, Disp: , Rfl:     albuterol sulfate HFA (PROVENTIL;VENTOLIN;PROAIR) 108 (90 Base) MCG/ACT inhaler, Inhale 1-2 puffs into the lungs every 4 hours as needed, Disp: , Rfl:     atorvastatin (LIPITOR) 20 MG tablet, Take 20 mg by mouth, Disp: , Rfl:     cetirizine (ZYRTEC) 10 MG tablet, Take 10 mg by mouth daily, Disp: , Rfl:     ferrous sulfate (IRON 325) 325 (65 Fe) MG tablet, Take 325 mg by mouth 2 times daily

## 2024-05-20 ENCOUNTER — HOSPITAL ENCOUNTER (OUTPATIENT)
Facility: HOSPITAL | Age: 69
Discharge: HOME | End: 2024-05-23
Payer: MEDICARE

## 2024-05-20 VITALS — WEIGHT: 214.07 LBS | HEIGHT: 68 IN | BODY MASS INDEX: 32.44 KG/M2

## 2024-05-20 DIAGNOSIS — Z12.31 VISIT FOR SCREENING MAMMOGRAM: ICD-10-CM

## 2024-05-20 PROCEDURE — 77063 BREAST TOMOSYNTHESIS BI: CPT

## 2024-10-03 ENCOUNTER — TRANSCRIBE ORDERS (OUTPATIENT)
Facility: HOSPITAL | Age: 69
End: 2024-10-03

## 2024-10-03 DIAGNOSIS — Z78.0 POSTMENOPAUSAL STATUS (AGE-RELATED) (NATURAL): Primary | ICD-10-CM

## 2024-10-27 ENCOUNTER — APPOINTMENT (OUTPATIENT)
Facility: HOSPITAL | Age: 69
End: 2024-10-27
Payer: MEDICARE

## 2024-10-27 ENCOUNTER — HOSPITAL ENCOUNTER (OUTPATIENT)
Facility: HOSPITAL | Age: 69
Setting detail: OBSERVATION
LOS: 2 days | Discharge: HOME | End: 2024-10-29
Attending: STUDENT IN AN ORGANIZED HEALTH CARE EDUCATION/TRAINING PROGRAM | Admitting: INTERNAL MEDICINE
Payer: MEDICARE

## 2024-10-27 DIAGNOSIS — M70.62 TROCHANTERIC BURSITIS, LEFT HIP: ICD-10-CM

## 2024-10-27 DIAGNOSIS — M25.552 PAIN IN LEFT HIP: ICD-10-CM

## 2024-10-27 DIAGNOSIS — N13.30 HYDRONEPHROSIS, UNSPECIFIED HYDRONEPHROSIS TYPE: ICD-10-CM

## 2024-10-27 DIAGNOSIS — M70.61 TROCHANTERIC BURSITIS, RIGHT HIP: ICD-10-CM

## 2024-10-27 DIAGNOSIS — I63.9 CEREBROVASCULAR ACCIDENT (CVA), UNSPECIFIED MECHANISM (HCC): Primary | ICD-10-CM

## 2024-10-27 PROBLEM — I61.9 CVA (CEREBROVASCULAR ACCIDENT DUE TO INTRACEREBRAL HEMORRHAGE) (HCC): Status: ACTIVE | Noted: 2024-10-27

## 2024-10-27 LAB
ALBUMIN SERPL-MCNC: 2.9 G/DL (ref 3.4–5)
ALBUMIN SERPL-MCNC: 3.2 G/DL (ref 3.4–5)
ALBUMIN/GLOB SERPL: 0.5 (ref 0.8–1.7)
ALBUMIN/GLOB SERPL: 0.7 (ref 0.8–1.7)
ALP SERPL-CCNC: 121 U/L (ref 45–117)
ALP SERPL-CCNC: 128 U/L (ref 45–117)
ALT SERPL-CCNC: 12 U/L (ref 13–56)
ALT SERPL-CCNC: 14 U/L (ref 13–56)
ANION GAP SERPL CALC-SCNC: 7 MMOL/L (ref 3–18)
ANION GAP SERPL CALC-SCNC: 9 MMOL/L (ref 3–18)
AST SERPL-CCNC: 21 U/L (ref 10–38)
AST SERPL-CCNC: 21 U/L (ref 10–38)
BASOPHILS # BLD: 0 K/UL (ref 0–0.1)
BASOPHILS # BLD: 0 K/UL (ref 0–0.1)
BASOPHILS NFR BLD: 0 % (ref 0–2)
BASOPHILS NFR BLD: 0 % (ref 0–2)
BILIRUB SERPL-MCNC: 0.3 MG/DL (ref 0.2–1)
BILIRUB SERPL-MCNC: 0.4 MG/DL (ref 0.2–1)
BUN SERPL-MCNC: 11 MG/DL (ref 7–18)
BUN SERPL-MCNC: 14 MG/DL (ref 7–18)
BUN/CREAT SERPL: 14 (ref 12–20)
BUN/CREAT SERPL: 16 (ref 12–20)
CALCIUM SERPL-MCNC: 9.5 MG/DL (ref 8.5–10.1)
CALCIUM SERPL-MCNC: 9.7 MG/DL (ref 8.5–10.1)
CHLORIDE SERPL-SCNC: 107 MMOL/L (ref 100–111)
CHLORIDE SERPL-SCNC: 109 MMOL/L (ref 100–111)
CO2 SERPL-SCNC: 24 MMOL/L (ref 21–32)
CO2 SERPL-SCNC: 27 MMOL/L (ref 21–32)
CREAT SERPL-MCNC: 0.81 MG/DL (ref 0.6–1.3)
CREAT SERPL-MCNC: 0.9 MG/DL (ref 0.6–1.3)
DIFFERENTIAL METHOD BLD: ABNORMAL
DIFFERENTIAL METHOD BLD: ABNORMAL
EOSINOPHIL # BLD: 0.2 K/UL (ref 0–0.4)
EOSINOPHIL # BLD: 0.3 K/UL (ref 0–0.4)
EOSINOPHIL NFR BLD: 3 % (ref 0–5)
EOSINOPHIL NFR BLD: 5 % (ref 0–5)
ERYTHROCYTE [DISTWIDTH] IN BLOOD BY AUTOMATED COUNT: 17.8 % (ref 11.6–14.5)
ERYTHROCYTE [DISTWIDTH] IN BLOOD BY AUTOMATED COUNT: 17.9 % (ref 11.6–14.5)
GLOBULIN SER CALC-MCNC: 4.6 G/DL (ref 2–4)
GLOBULIN SER CALC-MCNC: 5.7 G/DL (ref 2–4)
GLUCOSE BLD STRIP.AUTO-MCNC: 74 MG/DL (ref 70–110)
GLUCOSE SERPL-MCNC: 76 MG/DL (ref 74–99)
GLUCOSE SERPL-MCNC: 81 MG/DL (ref 74–99)
HCT VFR BLD AUTO: 34.4 % (ref 35–45)
HCT VFR BLD AUTO: 37.7 % (ref 35–45)
HGB BLD-MCNC: 10.5 G/DL (ref 12–16)
HGB BLD-MCNC: 11.4 G/DL (ref 12–16)
IMM GRANULOCYTES # BLD AUTO: 0 K/UL (ref 0–0.04)
IMM GRANULOCYTES # BLD AUTO: 0 K/UL (ref 0–0.04)
IMM GRANULOCYTES NFR BLD AUTO: 0 % (ref 0–0.5)
IMM GRANULOCYTES NFR BLD AUTO: 0 % (ref 0–0.5)
INR PPP: 1.8 (ref 0.9–1.1)
LYMPHOCYTES # BLD: 1.5 K/UL (ref 0.9–3.6)
LYMPHOCYTES # BLD: 1.9 K/UL (ref 0.9–3.6)
LYMPHOCYTES NFR BLD: 25 % (ref 21–52)
LYMPHOCYTES NFR BLD: 39 % (ref 21–52)
MCH RBC QN AUTO: 26.1 PG (ref 24–34)
MCH RBC QN AUTO: 26.5 PG (ref 24–34)
MCHC RBC AUTO-ENTMCNC: 30.2 G/DL (ref 31–37)
MCHC RBC AUTO-ENTMCNC: 30.5 G/DL (ref 31–37)
MCV RBC AUTO: 86.3 FL (ref 78–100)
MCV RBC AUTO: 86.9 FL (ref 78–100)
MONOCYTES # BLD: 0.3 K/UL (ref 0.05–1.2)
MONOCYTES # BLD: 0.3 K/UL (ref 0.05–1.2)
MONOCYTES NFR BLD: 5 % (ref 3–10)
MONOCYTES NFR BLD: 6 % (ref 3–10)
NEUTS SEG # BLD: 2.5 K/UL (ref 1.8–8)
NEUTS SEG # BLD: 4.1 K/UL (ref 1.8–8)
NEUTS SEG NFR BLD: 51 % (ref 40–73)
NEUTS SEG NFR BLD: 65 % (ref 40–73)
NRBC # BLD: 0 K/UL (ref 0–0.01)
NRBC # BLD: 0 K/UL (ref 0–0.01)
NRBC BLD-RTO: 0 PER 100 WBC
NRBC BLD-RTO: 0 PER 100 WBC
PLATELET # BLD AUTO: 386 K/UL (ref 135–420)
PLATELET # BLD AUTO: 444 K/UL (ref 135–420)
PMV BLD AUTO: 9.1 FL (ref 9.2–11.8)
PMV BLD AUTO: 9.4 FL (ref 9.2–11.8)
POTASSIUM SERPL-SCNC: 3.3 MMOL/L (ref 3.5–5.5)
POTASSIUM SERPL-SCNC: 4 MMOL/L (ref 3.5–5.5)
PROT SERPL-MCNC: 7.8 G/DL (ref 6.4–8.2)
PROT SERPL-MCNC: 8.6 G/DL (ref 6.4–8.2)
PROTHROMBIN TIME: 20.8 SEC (ref 11.9–14.9)
RBC # BLD AUTO: 3.96 M/UL (ref 4.2–5.3)
RBC # BLD AUTO: 4.37 M/UL (ref 4.2–5.3)
SODIUM SERPL-SCNC: 141 MMOL/L (ref 136–145)
SODIUM SERPL-SCNC: 142 MMOL/L (ref 136–145)
TROPONIN I SERPL HS-MCNC: 18 NG/L (ref 0–54)
TROPONIN I SERPL HS-MCNC: 9 NG/L (ref 0–54)
WBC # BLD AUTO: 4.8 K/UL (ref 4.6–13.2)
WBC # BLD AUTO: 6.2 K/UL (ref 4.6–13.2)

## 2024-10-27 PROCEDURE — 80053 COMPREHEN METABOLIC PANEL: CPT

## 2024-10-27 PROCEDURE — 85610 PROTHROMBIN TIME: CPT

## 2024-10-27 PROCEDURE — 85025 COMPLETE CBC W/AUTO DIFF WBC: CPT

## 2024-10-27 PROCEDURE — 36415 COLL VENOUS BLD VENIPUNCTURE: CPT

## 2024-10-27 PROCEDURE — 6370000000 HC RX 637 (ALT 250 FOR IP): Performed by: INTERNAL MEDICINE

## 2024-10-27 PROCEDURE — 93005 ELECTROCARDIOGRAM TRACING: CPT | Performed by: STUDENT IN AN ORGANIZED HEALTH CARE EDUCATION/TRAINING PROGRAM

## 2024-10-27 PROCEDURE — 6360000004 HC RX CONTRAST MEDICATION: Performed by: STUDENT IN AN ORGANIZED HEALTH CARE EDUCATION/TRAINING PROGRAM

## 2024-10-27 PROCEDURE — 99285 EMERGENCY DEPT VISIT HI MDM: CPT

## 2024-10-27 PROCEDURE — 70450 CT HEAD/BRAIN W/O DYE: CPT

## 2024-10-27 PROCEDURE — 84484 ASSAY OF TROPONIN QUANT: CPT

## 2024-10-27 PROCEDURE — 82962 GLUCOSE BLOOD TEST: CPT

## 2024-10-27 PROCEDURE — 70498 CT ANGIOGRAPHY NECK: CPT

## 2024-10-27 PROCEDURE — 99223 1ST HOSP IP/OBS HIGH 75: CPT | Performed by: INTERNAL MEDICINE

## 2024-10-27 PROCEDURE — 71045 X-RAY EXAM CHEST 1 VIEW: CPT

## 2024-10-27 PROCEDURE — 6370000000 HC RX 637 (ALT 250 FOR IP)

## 2024-10-27 PROCEDURE — 1100000003 HC PRIVATE W/ TELEMETRY

## 2024-10-27 RX ORDER — ERGOCALCIFEROL 1.25 MG/1
50000 CAPSULE, LIQUID FILLED ORAL WEEKLY
COMMUNITY

## 2024-10-27 RX ORDER — IOPAMIDOL 755 MG/ML
100 INJECTION, SOLUTION INTRAVASCULAR
Status: COMPLETED | OUTPATIENT
Start: 2024-10-27 | End: 2024-10-27

## 2024-10-27 RX ORDER — ASPIRIN 325 MG
325 TABLET ORAL ONCE
Status: COMPLETED | OUTPATIENT
Start: 2024-10-27 | End: 2024-10-27

## 2024-10-27 RX ORDER — M-VIT,TX,IRON,MINS/CALC/FOLIC 27MG-0.4MG
1 TABLET ORAL DAILY
COMMUNITY

## 2024-10-27 RX ORDER — POTASSIUM CHLORIDE 1500 MG/1
40 TABLET, EXTENDED RELEASE ORAL ONCE
Status: COMPLETED | OUTPATIENT
Start: 2024-10-27 | End: 2024-10-27

## 2024-10-27 RX ORDER — CYCLOBENZAPRINE HCL 10 MG
10 TABLET ORAL 3 TIMES DAILY PRN
COMMUNITY

## 2024-10-27 RX ADMIN — POTASSIUM CHLORIDE 40 MEQ: 1500 TABLET, EXTENDED RELEASE ORAL at 18:09

## 2024-10-27 RX ADMIN — IOPAMIDOL 100 ML: 755 INJECTION, SOLUTION INTRAVENOUS at 15:39

## 2024-10-27 RX ADMIN — ASPIRIN 325 MG: 325 TABLET ORAL at 21:18

## 2024-10-27 ASSESSMENT — PAIN SCALES - GENERAL
PAINLEVEL_OUTOF10: 7
PAINLEVEL_OUTOF10: 0

## 2024-10-27 NOTE — ED NOTES
I have introduced myself to the patient and discussed anticipated plan of care. Patient resting quietly on stretcher in low and locked position. Call bell in reach. Side rail up x1.      Patient on cardiac monitor and continuous pulse oximetry.

## 2024-10-27 NOTE — ED TRIAGE NOTES
Right sided headache, shortness of breath, dizziness at times when standing.     History of blood clots, states this is similar.

## 2024-10-27 NOTE — ED PROVIDER NOTES
(H) 2.0 - 4.0 g/dL    Albumin/Globulin Ratio 0.5 (L) 0.8 - 1.7     Protime-INR    Collection Time: 10/27/24  2:38 PM   Result Value Ref Range    Protime 20.8 (H) 11.9 - 14.9 sec    INR 1.8 (H) 0.9 - 1.1     Troponin    Collection Time: 10/27/24  2:38 PM   Result Value Ref Range    Troponin, High Sensitivity 9 0 - 54 ng/L   POCT Glucose    Collection Time: 10/27/24  3:17 PM   Result Value Ref Range    POC Glucose 74 70 - 110 mg/dL       Radiologic Studies -   [unfilled]      Medical Decision Making     ED Course: Progress Notes, Reevaluation, and Consults:    6:05 PM Initial assessment performed. The patients presenting problems have been discussed, and they/their family are in agreement with the care plan formulated and outlined with them.  I have encouraged them to ask questions as they arise throughout their visit.      Provider Notes (Medical Decision Making):     Code Stroke called on presentation to ED for sensory changes in face, lower and upper extremity weakness, teleneurologist recommended admission for stroke workup    CT Head w/o contrast without evidence of hemorrhage, acute CVA    CTA Head/Neck with preliminary read of no LVO, significant stenoses    EKG with NSR, no evidence of acute ST-T segment changes, troponin wnl    CMP pertinent for mild hypokalemia to 3.3, s/p 40 MG oral potassium     CBC without elevated WBC, normocytic anemia, Hgb 10.5    INR 1.8    Paged and spoke with hospitalist Dr. aDniel accepted patient for admission    Vital Signs-Reviewed the patient's vital signs. Reviewed pt's pulse ox reading.     Records Reviewed: old medical records  -I am the first provider for this patient.  -I reviewed the vital signs, available nursing notes, past medical history, past surgical history, family history and social history.    Current Facility-Administered Medications   Medication Dose Route Frequency Provider Last Rate Last Admin    potassium chloride (KLOR-CON M) extended release tablet 40 mEq

## 2024-10-27 NOTE — ED NOTES
CODE STROKE  OVERHEAD  TO CT  3:07     TELE NEURO SPOKE WITH JESSENIA  3:08     TELE NEURO DR FOURNIER CALLED BACK 3:12

## 2024-10-27 NOTE — ED NOTES
TRANSFER - OUT REPORT:    Verbal report given to Pat Fontenot RN  on Edie Curiel  being transferred to [facility]  for routine progression of patient care       Report consisted of patient's Situation, Background, Assessment and   Recommendations(SBAR).     Information from the following report(s) ED Encounter Summary was reviewed with the receiving nurse.    [place] Fall Assessment:    Presents to emergency department  because of falls (Syncope, seizure, or loss of consciousness): No  Age > 70: No  Altered Mental Status, Intoxication with alcohol or substance confusion (Disorientation, impaired judgment, poor safety awaremess, or inability to follow instructions): No  Impaired Mobility: Ambulates or transfers with assistive devices or assistance; Unable to ambulate or transer.: No  Nursing Judgement: No          Lines:   Peripheral IV 10/27/24 Right Antecubital (Active)   Site Assessment Clean, dry & intact 10/27/24 1652        Opportunity for questions and clarification was provided.      Patient transported with:  Monitor

## 2024-10-27 NOTE — H&P
History and Physical    Patient: Edie Curiel MRN: [id number]  SSN: [id number]    YOB: 1955    Age: 69 y.o.  Sex: female    Jovany Haque, MYRIAM - CNP    C/C : Right fascia drop     Subjective:      Edie Curiel is a 69 y.o. female with PMH of hypertension, DJD hip s/p hip replacement, recurrent PE on chronic warfarin therapy, history of DVT, GERD, history of breast cancer, being admitted with a history of multiple symptoms including weakness, shortness of breath, right facial numbness, tingling in the hand.  According to patient and the family in room with the patient, she developed severe headache about 2 weeks back which continued and progressively increased without any relief, this was followed by increasing dizziness, right facial numbness tingling in hand.  Because of this new symptoms and progressive symptoms she came to [facility] from where she was admitted for further workup for possibility of stroke and other management..  Patient's initial CT head and CTA unremarkable.        Past Medical History:   Diagnosis Date    Arthritis     Breast cancer (HCC)     Left    Cancer (HCC) 2001    Lobular situ-Left breast    Chronic pain     DVT (deep venous thrombosis) (HCC)     GERD (gastroesophageal reflux disease)     History of therapeutic radiation     Hx of radiation therapy     Hyperlipidemia     Hypertension     no meds    Low back pain potentially associated with radiculopathy     Menopause     Osteopenia     mild    Pulmonary embolism (HCC)     Radiation therapy complication     Left breast    S/P hip replacement     Right hip    Trochanteric bursitis of right hip     Wears glasses      Past Surgical History:   Procedure Laterality Date    BREAST LUMPECTOMY      OTHER SURGICAL HISTORY Bilateral 2014    bilat styes on eyes    TOTAL HIP ARTHROPLASTY  2/12/2003    Right hip    TUBAL LIGATION  1983      Family History   Problem Relation Age of Onset    Hypertension Brother

## 2024-10-28 ENCOUNTER — APPOINTMENT (OUTPATIENT)
Facility: HOSPITAL | Age: 69
End: 2024-10-28
Payer: MEDICARE

## 2024-10-28 LAB
CHOLEST SERPL-MCNC: 157 MG/DL
EKG ATRIAL RATE: 48 BPM
EKG ATRIAL RATE: 72 BPM
EKG DIAGNOSIS: NORMAL
EKG DIAGNOSIS: NORMAL
EKG P AXIS: 4 DEGREES
EKG P AXIS: 50 DEGREES
EKG P-R INTERVAL: 138 MS
EKG P-R INTERVAL: 80 MS
EKG Q-T INTERVAL: 388 MS
EKG Q-T INTERVAL: 458 MS
EKG QRS DURATION: 82 MS
EKG QRS DURATION: 88 MS
EKG QTC CALCULATION (BAZETT): 409 MS
EKG QTC CALCULATION (BAZETT): 424 MS
EKG R AXIS: 27 DEGREES
EKG R AXIS: 41 DEGREES
EKG T AXIS: 27 DEGREES
EKG T AXIS: 29 DEGREES
EKG VENTRICULAR RATE: 48 BPM
EKG VENTRICULAR RATE: 72 BPM
GLUCOSE BLD STRIP.AUTO-MCNC: 66 MG/DL (ref 70–110)
GLUCOSE BLD STRIP.AUTO-MCNC: 66 MG/DL (ref 70–110)
GLUCOSE BLD STRIP.AUTO-MCNC: 67 MG/DL (ref 70–110)
GLUCOSE BLD STRIP.AUTO-MCNC: 67 MG/DL (ref 70–110)
GLUCOSE BLD STRIP.AUTO-MCNC: 92 MG/DL (ref 70–110)
GLUCOSE BLD STRIP.AUTO-MCNC: 94 MG/DL (ref 70–110)
HDLC SERPL-MCNC: 53 MG/DL (ref 40–60)
HDLC SERPL: 3 (ref 0–5)
LDLC SERPL CALC-MCNC: 86.8 MG/DL (ref 0–100)
LIPID PANEL: NORMAL
TRIGL SERPL-MCNC: 86 MG/DL
VLDLC SERPL CALC-MCNC: 17.2 MG/DL

## 2024-10-28 PROCEDURE — 6370000000 HC RX 637 (ALT 250 FOR IP): Performed by: INTERNAL MEDICINE

## 2024-10-28 PROCEDURE — 94761 N-INVAS EAR/PLS OXIMETRY MLT: CPT

## 2024-10-28 PROCEDURE — 82962 GLUCOSE BLOOD TEST: CPT

## 2024-10-28 PROCEDURE — 94640 AIRWAY INHALATION TREATMENT: CPT

## 2024-10-28 PROCEDURE — 93010 ELECTROCARDIOGRAM REPORT: CPT | Performed by: INTERNAL MEDICINE

## 2024-10-28 PROCEDURE — 6360000002 HC RX W HCPCS: Performed by: INTERNAL MEDICINE

## 2024-10-28 PROCEDURE — 1100000003 HC PRIVATE W/ TELEMETRY

## 2024-10-28 PROCEDURE — 70551 MRI BRAIN STEM W/O DYE: CPT

## 2024-10-28 PROCEDURE — 6360000004 HC RX CONTRAST MEDICATION: Performed by: STUDENT IN AN ORGANIZED HEALTH CARE EDUCATION/TRAINING PROGRAM

## 2024-10-28 PROCEDURE — 6370000000 HC RX 637 (ALT 250 FOR IP): Performed by: STUDENT IN AN ORGANIZED HEALTH CARE EDUCATION/TRAINING PROGRAM

## 2024-10-28 PROCEDURE — 2580000003 HC RX 258: Performed by: INTERNAL MEDICINE

## 2024-10-28 PROCEDURE — 2580000003 HC RX 258: Performed by: FAMILY MEDICINE

## 2024-10-28 PROCEDURE — 36415 COLL VENOUS BLD VENIPUNCTURE: CPT

## 2024-10-28 PROCEDURE — 99233 SBSQ HOSP IP/OBS HIGH 50: CPT | Performed by: FAMILY MEDICINE

## 2024-10-28 PROCEDURE — 80061 LIPID PANEL: CPT

## 2024-10-28 PROCEDURE — 71275 CT ANGIOGRAPHY CHEST: CPT

## 2024-10-28 RX ORDER — WARFARIN SODIUM 3 MG/1
3 TABLET ORAL
Status: COMPLETED | OUTPATIENT
Start: 2024-10-28 | End: 2024-10-28

## 2024-10-28 RX ORDER — ONDANSETRON 4 MG/1
4 TABLET, ORALLY DISINTEGRATING ORAL EVERY 8 HOURS PRN
Status: DISCONTINUED | OUTPATIENT
Start: 2024-10-28 | End: 2024-10-29 | Stop reason: HOSPADM

## 2024-10-28 RX ORDER — HYDROCODONE BITARTRATE AND ACETAMINOPHEN 10; 325 MG/1; MG/1
1 TABLET ORAL EVERY 6 HOURS PRN
Status: DISCONTINUED | OUTPATIENT
Start: 2024-10-28 | End: 2024-10-29 | Stop reason: HOSPADM

## 2024-10-28 RX ORDER — POLYETHYLENE GLYCOL 3350 17 G/17G
17 POWDER, FOR SOLUTION ORAL DAILY
Status: DISCONTINUED | OUTPATIENT
Start: 2024-10-28 | End: 2024-10-29 | Stop reason: HOSPADM

## 2024-10-28 RX ORDER — FERROUS SULFATE 325(65) MG
325 TABLET ORAL 2 TIMES DAILY WITH MEALS
Status: DISCONTINUED | OUTPATIENT
Start: 2024-10-28 | End: 2024-10-29 | Stop reason: HOSPADM

## 2024-10-28 RX ORDER — GLUCAGON 1 MG
1 KIT INJECTION PRN
Status: DISCONTINUED | OUTPATIENT
Start: 2024-10-28 | End: 2024-10-29 | Stop reason: HOSPADM

## 2024-10-28 RX ORDER — SODIUM CHLORIDE 0.9 % (FLUSH) 0.9 %
5-40 SYRINGE (ML) INJECTION EVERY 12 HOURS SCHEDULED
Status: DISCONTINUED | OUTPATIENT
Start: 2024-10-28 | End: 2024-10-29 | Stop reason: HOSPADM

## 2024-10-28 RX ORDER — MULTIVITAMIN WITH IRON
1 TABLET ORAL DAILY
Status: DISCONTINUED | OUTPATIENT
Start: 2024-10-28 | End: 2024-10-29 | Stop reason: HOSPADM

## 2024-10-28 RX ORDER — ATORVASTATIN CALCIUM 20 MG/1
20 TABLET, FILM COATED ORAL NIGHTLY
Status: DISCONTINUED | OUTPATIENT
Start: 2024-10-28 | End: 2024-10-29 | Stop reason: HOSPADM

## 2024-10-28 RX ORDER — CYCLOBENZAPRINE HCL 10 MG
10 TABLET ORAL 3 TIMES DAILY PRN
Status: DISCONTINUED | OUTPATIENT
Start: 2024-10-28 | End: 2024-10-29 | Stop reason: HOSPADM

## 2024-10-28 RX ORDER — POLYETHYLENE GLYCOL 3350 17 G/17G
17 POWDER, FOR SOLUTION ORAL DAILY
Status: DISCONTINUED | OUTPATIENT
Start: 2024-10-28 | End: 2024-10-28 | Stop reason: CLARIF

## 2024-10-28 RX ORDER — POTASSIUM CHLORIDE 1500 MG/1
40 TABLET, EXTENDED RELEASE ORAL PRN
Status: DISCONTINUED | OUTPATIENT
Start: 2024-10-28 | End: 2024-10-29 | Stop reason: HOSPADM

## 2024-10-28 RX ORDER — DEXTROSE MONOHYDRATE 100 MG/ML
INJECTION, SOLUTION INTRAVENOUS CONTINUOUS
Status: DISCONTINUED | OUTPATIENT
Start: 2024-10-28 | End: 2024-10-29

## 2024-10-28 RX ORDER — IOPAMIDOL 755 MG/ML
85 INJECTION, SOLUTION INTRAVASCULAR
Status: COMPLETED | OUTPATIENT
Start: 2024-10-28 | End: 2024-10-28

## 2024-10-28 RX ORDER — PANTOPRAZOLE SODIUM 40 MG/1
40 TABLET, DELAYED RELEASE ORAL DAILY
Status: DISCONTINUED | OUTPATIENT
Start: 2024-10-28 | End: 2024-10-29 | Stop reason: HOSPADM

## 2024-10-28 RX ORDER — DOCUSATE SODIUM 100 MG/1
100 CAPSULE, LIQUID FILLED ORAL DAILY
Status: DISCONTINUED | OUTPATIENT
Start: 2024-10-28 | End: 2024-10-29 | Stop reason: HOSPADM

## 2024-10-28 RX ORDER — CETIRIZINE HYDROCHLORIDE 10 MG/1
10 TABLET ORAL DAILY
Status: DISCONTINUED | OUTPATIENT
Start: 2024-10-28 | End: 2024-10-29 | Stop reason: HOSPADM

## 2024-10-28 RX ORDER — DEXTROSE MONOHYDRATE 100 MG/ML
INJECTION, SOLUTION INTRAVENOUS CONTINUOUS PRN
Status: DISCONTINUED | OUTPATIENT
Start: 2024-10-28 | End: 2024-10-29 | Stop reason: HOSPADM

## 2024-10-28 RX ORDER — ACETAMINOPHEN 325 MG/1
650 TABLET ORAL EVERY 6 HOURS PRN
Status: DISCONTINUED | OUTPATIENT
Start: 2024-10-28 | End: 2024-10-29 | Stop reason: HOSPADM

## 2024-10-28 RX ORDER — POTASSIUM CHLORIDE 7.45 MG/ML
10 INJECTION INTRAVENOUS PRN
Status: DISCONTINUED | OUTPATIENT
Start: 2024-10-28 | End: 2024-10-29 | Stop reason: HOSPADM

## 2024-10-28 RX ORDER — MAGNESIUM SULFATE IN WATER 40 MG/ML
2000 INJECTION, SOLUTION INTRAVENOUS PRN
Status: DISCONTINUED | OUTPATIENT
Start: 2024-10-28 | End: 2024-10-29 | Stop reason: HOSPADM

## 2024-10-28 RX ORDER — ACETAMINOPHEN 650 MG/1
650 SUPPOSITORY RECTAL EVERY 6 HOURS PRN
Status: DISCONTINUED | OUTPATIENT
Start: 2024-10-28 | End: 2024-10-29 | Stop reason: HOSPADM

## 2024-10-28 RX ORDER — SODIUM CHLORIDE 9 MG/ML
INJECTION, SOLUTION INTRAVENOUS PRN
Status: DISCONTINUED | OUTPATIENT
Start: 2024-10-28 | End: 2024-10-29 | Stop reason: HOSPADM

## 2024-10-28 RX ORDER — SODIUM CHLORIDE 0.9 % (FLUSH) 0.9 %
5-40 SYRINGE (ML) INJECTION PRN
Status: DISCONTINUED | OUTPATIENT
Start: 2024-10-28 | End: 2024-10-29 | Stop reason: HOSPADM

## 2024-10-28 RX ORDER — ONDANSETRON 2 MG/ML
4 INJECTION INTRAMUSCULAR; INTRAVENOUS EVERY 6 HOURS PRN
Status: DISCONTINUED | OUTPATIENT
Start: 2024-10-28 | End: 2024-10-29 | Stop reason: HOSPADM

## 2024-10-28 RX ADMIN — PANTOPRAZOLE SODIUM 40 MG: 40 TABLET, DELAYED RELEASE ORAL at 10:42

## 2024-10-28 RX ADMIN — THERA TABS 1 TABLET: TAB at 10:42

## 2024-10-28 RX ADMIN — WARFARIN 3 MG: 3 TABLET ORAL at 17:10

## 2024-10-28 RX ADMIN — ARFORMOTEROL TARTRATE: 15 SOLUTION RESPIRATORY (INHALATION) at 20:18

## 2024-10-28 RX ADMIN — ATORVASTATIN CALCIUM 20 MG: 20 TABLET, FILM COATED ORAL at 21:19

## 2024-10-28 RX ADMIN — DOCUSATE SODIUM 100 MG: 100 CAPSULE, LIQUID FILLED ORAL at 10:42

## 2024-10-28 RX ADMIN — ACETAMINOPHEN 325MG 650 MG: 325 TABLET ORAL at 17:10

## 2024-10-28 RX ADMIN — IOPAMIDOL 85 ML: 755 INJECTION, SOLUTION INTRAVENOUS at 21:03

## 2024-10-28 RX ADMIN — SODIUM CHLORIDE, PRESERVATIVE FREE 10 ML: 5 INJECTION INTRAVENOUS at 21:21

## 2024-10-28 RX ADMIN — SODIUM CHLORIDE, PRESERVATIVE FREE 10 ML: 5 INJECTION INTRAVENOUS at 10:42

## 2024-10-28 RX ADMIN — FERROUS SULFATE TAB 325 MG (65 MG ELEMENTAL FE) 325 MG: 325 (65 FE) TAB at 10:41

## 2024-10-28 RX ADMIN — DEXTROSE MONOHYDRATE: 100 INJECTION, SOLUTION INTRAVENOUS at 17:38

## 2024-10-28 RX ADMIN — FERROUS SULFATE TAB 325 MG (65 MG ELEMENTAL FE) 325 MG: 325 (65 FE) TAB at 17:11

## 2024-10-28 RX ADMIN — CETIRIZINE HYDROCHLORIDE 10 MG: 10 TABLET, FILM COATED ORAL at 10:42

## 2024-10-28 ASSESSMENT — PAIN SCALES - GENERAL
PAINLEVEL_OUTOF10: 0
PAINLEVEL_OUTOF10: 0
PAINLEVEL_OUTOF10: 3
PAINLEVEL_OUTOF10: 0

## 2024-10-28 ASSESSMENT — PAIN - FUNCTIONAL ASSESSMENT: PAIN_FUNCTIONAL_ASSESSMENT: ACTIVITIES ARE NOT PREVENTED

## 2024-10-28 ASSESSMENT — PAIN DESCRIPTION - ONSET: ONSET: ON-GOING

## 2024-10-28 ASSESSMENT — PAIN DESCRIPTION - LOCATION: LOCATION: HEAD

## 2024-10-28 ASSESSMENT — PAIN DESCRIPTION - PAIN TYPE: TYPE: ACUTE PAIN

## 2024-10-28 ASSESSMENT — PAIN DESCRIPTION - DESCRIPTORS: DESCRIPTORS: ACHING

## 2024-10-28 ASSESSMENT — PAIN DESCRIPTION - ORIENTATION: ORIENTATION: ANTERIOR

## 2024-10-28 ASSESSMENT — PAIN DESCRIPTION - FREQUENCY: FREQUENCY: CONTINUOUS

## 2024-10-28 NOTE — CARE COORDINATION
10/28/24 0843   Service Assessment   Patient Orientation Alert and Oriented   Cognition Alert   History Provided By Patient   Primary Caregiver Self   Accompanied By/Relationship No one at bedside   Support Systems Spouse/Significant Other;Children   Patient's Healthcare Decision Maker is: Patient Declined (Legal Next of Kin Remains as Decision Maker)   PCP Verified by CM Yes   Last Visit to PCP Within last 3 months   Prior Functional Level Independent in ADLs/IADLs   Current Functional Level Independent in ADLs/IADLs   Can patient return to prior living arrangement Yes   Ability to make needs known: Good   Family able to assist with home care needs: Yes   Would you like for me to discuss the discharge plan with any other family members/significant others, and if so, who? Yes  (Cj Curiel- spouse)   Financial Resources Medicare   Community Resources None   CM/SW Referral Other (see comment)  (not applicable)   Social/Functional History   Lives With Spouse   Type of Home House   Home Layout One level   Home Access Stairs to enter with rails   Entrance Stairs - Number of Steps 4   Entrance Stairs - Rails Both   Bathroom Shower/Tub Tub/Shower unit   Bathroom Toilet Standard   Bathroom Equipment Grab bars in shower   Bathroom Accessibility Accessible   Home Equipment Cane   Receives Help From Family   ADL Assistance Independent   Homemaking Assistance Independent   Homemaking Responsibilities Yes   Ambulation Assistance Independent   Transfer Assistance Independent   Active  Yes   Mode of Transportation SUV   Education   (not applicable)   Occupation Retired   Type of Occupation   (not applicable)   Discharge Planning   Type of Residence House   Living Arrangements Spouse/Significant Other   Current Services Prior To Admission None   Potential Assistance Needed N/A   DME Ordered? No   Potential Assistance Purchasing Medications No   Type of Home Care Services None   Patient expects to be discharged to:

## 2024-10-29 ENCOUNTER — APPOINTMENT (OUTPATIENT)
Facility: HOSPITAL | Age: 69
End: 2024-10-29
Payer: MEDICARE

## 2024-10-29 VITALS
RESPIRATION RATE: 18 BRPM | HEART RATE: 52 BPM | WEIGHT: 212 LBS | TEMPERATURE: 97.9 F | DIASTOLIC BLOOD PRESSURE: 71 MMHG | HEIGHT: 68 IN | OXYGEN SATURATION: 96 % | SYSTOLIC BLOOD PRESSURE: 116 MMHG | BODY MASS INDEX: 32.13 KG/M2

## 2024-10-29 PROBLEM — R29.90 STROKE-LIKE SYMPTOMS: Status: ACTIVE | Noted: 2024-10-29

## 2024-10-29 LAB
GLUCOSE BLD STRIP.AUTO-MCNC: 76 MG/DL (ref 70–110)
GLUCOSE BLD STRIP.AUTO-MCNC: 99 MG/DL (ref 70–110)
INR PPP: 1.7 (ref 0.9–1.1)
PROTHROMBIN TIME: 20.5 SEC (ref 11.9–14.9)

## 2024-10-29 PROCEDURE — 99239 HOSP IP/OBS DSCHRG MGMT >30: CPT | Performed by: FAMILY MEDICINE

## 2024-10-29 PROCEDURE — G0378 HOSPITAL OBSERVATION PER HR: HCPCS

## 2024-10-29 PROCEDURE — 6360000002 HC RX W HCPCS: Performed by: INTERNAL MEDICINE

## 2024-10-29 PROCEDURE — 2580000003 HC RX 258: Performed by: INTERNAL MEDICINE

## 2024-10-29 PROCEDURE — 6370000000 HC RX 637 (ALT 250 FOR IP): Performed by: INTERNAL MEDICINE

## 2024-10-29 PROCEDURE — 94640 AIRWAY INHALATION TREATMENT: CPT

## 2024-10-29 PROCEDURE — 36415 COLL VENOUS BLD VENIPUNCTURE: CPT

## 2024-10-29 PROCEDURE — 76770 US EXAM ABDO BACK WALL COMP: CPT

## 2024-10-29 PROCEDURE — 82962 GLUCOSE BLOOD TEST: CPT

## 2024-10-29 PROCEDURE — 94761 N-INVAS EAR/PLS OXIMETRY MLT: CPT

## 2024-10-29 PROCEDURE — 85610 PROTHROMBIN TIME: CPT

## 2024-10-29 RX ORDER — WARFARIN SODIUM 3 MG/1
3 TABLET ORAL
Status: DISCONTINUED | OUTPATIENT
Start: 2024-10-29 | End: 2024-10-29 | Stop reason: HOSPADM

## 2024-10-29 RX ADMIN — DOCUSATE SODIUM 100 MG: 100 CAPSULE, LIQUID FILLED ORAL at 09:16

## 2024-10-29 RX ADMIN — PANTOPRAZOLE SODIUM 40 MG: 40 TABLET, DELAYED RELEASE ORAL at 09:16

## 2024-10-29 RX ADMIN — ARFORMOTEROL TARTRATE: 15 SOLUTION RESPIRATORY (INHALATION) at 09:04

## 2024-10-29 RX ADMIN — FERROUS SULFATE TAB 325 MG (65 MG ELEMENTAL FE) 325 MG: 325 (65 FE) TAB at 09:16

## 2024-10-29 RX ADMIN — CETIRIZINE HYDROCHLORIDE 10 MG: 10 TABLET, FILM COATED ORAL at 09:15

## 2024-10-29 RX ADMIN — THERA TABS 1 TABLET: TAB at 09:16

## 2024-10-29 RX ADMIN — SODIUM CHLORIDE, PRESERVATIVE FREE 10 ML: 5 INJECTION INTRAVENOUS at 09:17

## 2024-10-29 ASSESSMENT — PAIN SCALES - GENERAL
PAINLEVEL_OUTOF10: 0

## 2024-10-29 NOTE — DISCHARGE INSTRUCTIONS
Discussed with the patient and all questioned fully answered. She will call me if any problems arise.     DISCHARGE SUMMARY from Nurse    PATIENT INSTRUCTIONS:  What to do at Home:  Recommended activity: activity as tolerated,     If you experience any of the following symptoms like facial droop, arm drift, slurred speech, please follow up with emergency department or primary MD.    *  Please give a list of your current medications to your Primary Care Provider.    *  Please update this list whenever your medications are discontinued, doses are      changed, or new medications (including over-the-counter products) are added.    *  Please carry medication information at all times in case of emergency situations.    These are general instructions for a healthy lifestyle:    No smoking/ No tobacco products/ Avoid exposure to second hand smoke  Surgeon General's Warning:  Quitting smoking now greatly reduces serious risk to your health.    Obesity, smoking, and sedentary lifestyle greatly increases your risk for illness    A healthy diet, regular physical exercise & weight monitoring are important for maintaining a healthy lifestyle    You may be retaining fluid if you have a history of heart failure or if you experience any of the following symptoms:  Weight gain of 3 pounds or more overnight or 5 pounds in a week, increased swelling in our hands or feet or shortness of breath while lying flat in bed.  Please call your doctor as soon as you notice any of these symptoms; do not wait until your next office visit.        The discharge information has been reviewed with the patient.  The patient verbalized understanding.  Discharge medications reviewed with the patient and appropriate educational materials and side effects teaching were provided.  ___________________________________________________________________________________________________________________________________

## 2024-10-29 NOTE — PLAN OF CARE
Problem: Chronic Conditions and Co-morbidities  Goal: Patient's chronic conditions and co-morbidity symptoms are monitored and maintained or improved  Outcome: Progressing     Problem: Pain  Goal: Verbalizes/displays adequate comfort level or baseline comfort level  Outcome: Progressing     Problem: Safety - Adult  Goal: Free from fall injury  Outcome: Progressing     
  Problem: Chronic Conditions and Co-morbidities  Goal: Patient's chronic conditions and co-morbidity symptoms are monitored and maintained or improved  Outcome: Progressing     Problem: Pain  Goal: Verbalizes/displays adequate comfort level or baseline comfort level  Outcome: Progressing     Problem: Safety - Adult  Goal: Free from fall injury  Outcome: Progressing     Problem: Neurosensory - Adult  Goal: Achieves stable or improved neurological status  Description: Monitor NIHSS every 4 hours for 6 times and then every shift.  Patients symptoms have mostly resolved with some right sided numbness.  MRI completed today  10/29/2024 0143 by Angela Bravo RN  Outcome: Progressing  10/28/2024 1829 by Lety Pineda RN  Outcome: Progressing  Goal: Achieves maximal functionality and self care  Description: Assist patient with ADLs as needed  10/29/2024 0143 by Angela Bravo RN  Outcome: Progressing  10/28/2024 1829 by Lety Pineda, RN  Outcome: Progressing     Problem: Cardiovascular - Adult  Goal: Maintains optimal cardiac output and hemodynamic stability  Description: Monitor vital signs and telemetry strips  10/28/2024 1829 by Lety Pineda, RN  Outcome: Progressing     
  Problem: Neurosensory - Adult  Goal: Achieves stable or improved neurological status  Description: Monitor NIHSS every 4 hours for 6 times and then every shift.  Patients symptoms have mostly resolved with some right sided numbness.  MRI completed today  Outcome: Progressing  Goal: Achieves maximal functionality and self care  Description: Assist patient with ADLs as needed  Outcome: Progressing     Problem: Cardiovascular - Adult  Goal: Maintains optimal cardiac output and hemodynamic stability  Description: Monitor vital signs and telemetry strips  Outcome: Progressing     
Independent Practitioner if interventions unsuccessful or patient reports new pain     Problem: Safety - Adult  Goal: Free from fall injury  Outcome: Progressing  Flowsheets (Taken 10/29/2024 1604)  Free From Fall Injury:   Instruct family/caregiver on patient safety   Based on caregiver fall risk screen, instruct family/caregiver to ask for assistance with transferring infant if caregiver noted to have fall risk factors     Problem: Neurosensory - Adult  Goal: Achieves stable or improved neurological status  Description: Monitor NIHSS every 4 hours for 6 times and then every shift.  Patients symptoms have mostly resolved with some right sided numbness.  MRI completed today  Outcome: Progressing  Flowsheets (Taken 10/29/2024 1604)  Achieves stable or improved neurological status:   Assess for and report changes in neurological status   Initiate measures to prevent increased intracranial pressure   Maintain blood pressure and fluid volume within ordered parameters to optimize cerebral perfusion and minimize risk of hemorrhage   Monitor temperature, glucose, and sodium. Initiate appropriate interventions as ordered     Problem: Cardiovascular - Adult  Goal: Maintains optimal cardiac output and hemodynamic stability  Description: Monitor vital signs and telemetry strips  Outcome: Progressing  Flowsheets (Taken 10/29/2024 1604)  Maintains optimal cardiac output and hemodynamic stability:   Monitor blood pressure and heart rate   Assess for signs of decreased cardiac output   Monitor urine output and notify Licensed Independent Practitioner for values outside of normal range   Administer fluid and/or volume expanders as ordered     
RN  Outcome: Completed  10/29/2024 1604 by Eliud Jones RN  Outcome: Progressing  Flowsheets (Taken 10/29/2024 1604)  Maintains optimal cardiac output and hemodynamic stability:   Monitor blood pressure and heart rate   Assess for signs of decreased cardiac output   Monitor urine output and notify Licensed Independent Practitioner for values outside of normal range   Administer fluid and/or volume expanders as ordered

## 2024-10-29 NOTE — PROGRESS NOTES
met patient at bedside.    Patient said her niece was coming to pick her up to go home. She said she was ready and was feeling better. Patient thanked  for the visit.     provided presence and support for patient.    Chaplains will provide follow-up care for patient and family as needed.    Spiritual Health History and Assessment/Progress Note  [facility]    Spiritual/Emotional Needs,  ,  ,      Name: Edie Curiel MRN: [id number]    Age: 69 y.o.     Sex: female   Language: English   Lutheran: Bahai   CVA (cerebrovascular accident due to intracerebral hemorrhage) ([facility])     Date: 10/29/2024            Total Time Calculated: 5 min              Spiritual Assessment began in [facility] MEDICAL            Encounter Overview/Reason: Spiritual/Emotional Needs  Service Provided For: Patient    Marilynn, Belief, Meaning:   Patient identifies as spiritual, is connected with a marilynn tradition or spiritual practice, and has beliefs or practices that help with coping during difficult times  Family/Friends No family/friends present      Importance and Influence:  Patient has spiritual/personal beliefs that influence decisions regarding their health  Family/Friends No family/friends present    Community:  Patient feels well-supported. Support system includes: Spouse/Partner and Extended family  Family/Friends No family/friends present    Assessment and Plan of Care:     Patient Interventions include: Facilitated expression of thoughts and feelings and Affirmed coping skills/support systems  Family/Friends Interventions include: No family/friends present    Patient Plan of Care: No spiritual needs identified for follow-up  Family/Friends Plan of Care: No family/friends present    Electronically signed by Chaplain Roya on 10/29/2024 at 5:46 PM   
Discharge patient in stable condition, discharge instructions given and understood. IV line removed aseptically including the telebox from the patient.  
Docusate sodium 100 mg po daily was therapeutically interchanged for Docusate 50 mg/5 ml liquid per the P &T Committee approved Therapeutic Interchanges Policy.  
MRI screening form needs to be filled out and faxed to [phone] BEFORE MRI can be scheduled. If unable to obtain information from the patient, MPOA needs to be contacted. If patient is claustrophobic or will need pain meds, please have ordered in advance in order to facilitate exam.  
[facility] Dosing Services    Consult for Warfarin Dosing by Pharmacy by Dr. Daniel  Consult provided for this 69 y.o.  female , for indication of History of DVT/PE (indefinite)    Dose to achieve an INR goal of 2.0 to 3.0  Home dose: 2mg daily (per outpatient dispense report), most recent dose 10/27 per medication reconciliation report.    Warfarin  3 (mg) to be given today at 18:00.     DATE INR DOSE   10/28 1.8 (10/27) 3 mg       Significant drug interactions: None  PT/INR Lab Results   Component Value Date/Time    INR 1.8 10/27/2024 02:38 PM       Platelets Lab Results   Component Value Date/Time     10/27/2024 09:45 PM      H/H Lab Results   Component Value Date/Time    HGB 11.4 10/27/2024 09:45 PM        Pharmacy to follow daily and will provide subsequent Warfarin dosing based on clinical status.     Signed RANDAL BOWLING RPH     
Occupational Therapy    Orders received, chart reviewed. Pt noted to be independently ambulating around room, and reports being independent w/ ADLs. Pt has good family support at home. Pt w/ no acute care OT needs at this time.   Thank you for this referral.   Maikol Quiñones MS, OTR/L  
Physical Therapy  PT order received and chart reviewed.  Pt was up ambulating around room Marily with SPC, notes she is at baseline level of functioning and has no PT needs at this time.  No formal evaluation indicated and PT will sign off.  Thank you for this referral.     Autumn Victor, PT, DPT     
Warfarin dosing - Pharmacy consult note    Indication: History of VTE/PE  Warfarin dose prior to admission: 2 mg daily per dispense report and patient.   Significant drug interactions: none    Recent Labs     10/27/24  1438 10/27/24  2145 10/29/24  0142   INR 1.8*  --  1.7*   HGB 10.5* 11.4*  --    * 386  --      Date INR Dose (mg)   10/28 1.8 (10/27) 3 mg   10/29 1.7 3 mg     Assessment/Plan  Goal INR: 2 - 3  Warfarin 3 mg today    INR order daily. Pharmacy will continue to follow.     Radha Pablo RPH    
Absolute 2.5 1.8 - 8.0 K/UL    Lymphocytes Absolute 1.9 0.9 - 3.6 K/UL    Monocytes Absolute 0.3 0.05 - 1.2 K/UL    Eosinophils Absolute 0.2 0.0 - 0.4 K/UL    Basophils Absolute 0.0 0.0 - 0.1 K/UL    Immature Granulocytes Absolute 0.0 0.00 - 0.04 K/UL    Differential Type AUTOMATED     Troponin    Collection Time: 10/27/24  9:45 PM   Result Value Ref Range    Troponin, High Sensitivity 18 0 - 54 ng/L   Comprehensive Metabolic Panel    Collection Time: 10/27/24  9:45 PM   Result Value Ref Range    Sodium 141 136 - 145 mmol/L    Potassium 4.0 3.5 - 5.5 mmol/L    Chloride 107 100 - 111 mmol/L    CO2 27 21 - 32 mmol/L    Anion Gap 7 3.0 - 18 mmol/L    Glucose 76 74 - 99 mg/dL    BUN 11 7.0 - 18 MG/DL    Creatinine 0.81 0.6 - 1.3 MG/DL    BUN/Creatinine Ratio 14 12 - 20      Est, Glom Filt Rate 78 >60 ml/min/1.73m2    Calcium 9.7 8.5 - 10.1 MG/DL    Total Bilirubin 0.4 0.2 - 1.0 MG/DL    ALT 14 13 - 56 U/L    AST 21 10 - 38 U/L    Alk Phosphatase 121 (H) 45 - 117 U/L    Total Protein 7.8 6.4 - 8.2 g/dL    Albumin 3.2 (L) 3.4 - 5.0 g/dL    Globulin 4.6 (H) 2.0 - 4.0 g/dL    Albumin/Globulin Ratio 0.7 (L) 0.8 - 1.7           Signed By: Torrey Mason MD

## 2024-10-29 NOTE — DISCHARGE SUMMARY
All CTs at this facility are performed using dose optimization techniques as  appropriate for performed exam which included adjustments in the MA and/or KV  according to the patient's size (including appropriate matching for the  site-specific examinations), or use of reconstruction technique.     FINDINGS: No hemodynamically significant stenosis is demonstrated involving the  right common carotid artery, right internal carotid artery, and right external  carotid artery.     There is minimal soft and calcified plaque in the left carotid bulb. There is no  evidence of hemodynamically significant stenosis involving the left common  carotid artery, left internal carotid artery, and left external carotid artery.     There is minimal calcified plaque in the cavernous segments of the right and  left internal carotid arteries without evidence of significant stenosis.     No vertebral artery stenosis is noted.     There is no evidence of subclavian artery stenosis.     The anterior and middle cerebral arteries within normal limits. The basilar and  posterior cerebral arteries are unremarkable. No vascular stenosis or occlusion  is noted. No aneurysm or arteriovenous formation is identified.     Incidentally noted, there is a 3 mm hypodense nodule in the right lobe of the  thyroid gland.     IMPRESSION:  No evidence of vascular stenosis or occlusion.     Electronically signed by Juwan Rizzo           Exam Ended: 10/27/24 15:39 EDT Last Resulted: 10/28/24 08:12 EDT                EXAM: CT CODE NEURO HEAD WO CONTRAST     CLINICAL INDICATION/HISTORY: Stroke     COMPARISON: 3/27/2013     TECHNIQUE: Axial imaging of the head from the skull base to the vertex without  IV contrast and reconstructed into coronal and sagittal planes.     All CT scans at this facility are performed using dose optimization technique as  appropriate to a performed exam, to include automated exposure control,  adjustment of the MA and/or kV

## 2024-10-29 NOTE — CARE COORDINATION
D/C order noted for today. Orders reviewed. No needs identified at this time. CM remains available if needed.    Lisa Petty, ALEXANDRN, RN  Case Management   [phone]

## 2024-11-05 ENCOUNTER — HOSPITAL ENCOUNTER (OUTPATIENT)
Facility: HOSPITAL | Age: 69
Discharge: HOME | End: 2024-11-08
Payer: MEDICARE

## 2024-11-05 DIAGNOSIS — Z78.0 POSTMENOPAUSAL STATUS (AGE-RELATED) (NATURAL): ICD-10-CM

## 2024-11-05 PROCEDURE — 77080 DXA BONE DENSITY AXIAL: CPT

## 2024-11-20 ENCOUNTER — OFFICE VISIT (OUTPATIENT)
Age: 69
End: 2024-11-20
Payer: MEDICARE

## 2024-11-20 VITALS — BODY MASS INDEX: 32.23 KG/M2 | HEIGHT: 68 IN

## 2024-11-20 DIAGNOSIS — M70.62 TROCHANTERIC BURSITIS, LEFT HIP: Primary | ICD-10-CM

## 2024-11-20 DIAGNOSIS — M70.61 TROCHANTERIC BURSITIS, RIGHT HIP: ICD-10-CM

## 2024-11-20 PROCEDURE — 1159F MED LIST DOCD IN RCRD: CPT | Performed by: PHYSICIAN ASSISTANT

## 2024-11-20 PROCEDURE — 1126F AMNT PAIN NOTED NONE PRSNT: CPT | Performed by: PHYSICIAN ASSISTANT

## 2024-11-20 PROCEDURE — 1124F ACP DISCUSS-NO DSCNMKR DOCD: CPT | Performed by: PHYSICIAN ASSISTANT

## 2024-11-20 PROCEDURE — 1160F RVW MEDS BY RX/DR IN RCRD: CPT | Performed by: PHYSICIAN ASSISTANT

## 2024-11-20 PROCEDURE — 99214 OFFICE O/P EST MOD 30 MIN: CPT | Performed by: PHYSICIAN ASSISTANT

## 2024-11-20 RX ORDER — BETAMETHASONE SODIUM PHOSPHATE AND BETAMETHASONE ACETATE 3; 3 MG/ML; MG/ML
12 INJECTION, SUSPENSION INTRA-ARTICULAR; INTRALESIONAL; INTRAMUSCULAR; SOFT TISSUE ONCE
Status: COMPLETED | OUTPATIENT
Start: 2024-11-20 | End: 2024-11-20

## 2024-11-20 RX ADMIN — BETAMETHASONE SODIUM PHOSPHATE AND BETAMETHASONE ACETATE 12 MG: 3; 3 INJECTION, SUSPENSION INTRA-ARTICULAR; INTRALESIONAL; INTRAMUSCULAR; SOFT TISSUE at 10:42

## 2024-11-20 NOTE — PROGRESS NOTES
each hip    Date of procedure: 11/20/24    Procedure performed by: NOLAN PALMER PA-C    Provider assisted by: none    Patient assisted by: self    How tolerated by patient: tolerated the procedure well with no complications    Post Procedural Pain Scale: 1    Comments:   [facility] using a frequency of 10MHz with a 12L-RS transducer head was used to confirm needle placement.  Ultrasound images captured using [employer] Ultrasound machine and scanned into patient's chart       JR Jim ROGEL PA-C, ATC      Note:  This note was generated with voice recognition software.  Any typographical/grammatical errors are unintentional.

## 2025-04-30 ENCOUNTER — OFFICE VISIT (OUTPATIENT)
Age: 70
End: 2025-04-30
Payer: MEDICARE

## 2025-04-30 VITALS — HEIGHT: 68 IN | BODY MASS INDEX: 31.22 KG/M2 | WEIGHT: 206 LBS

## 2025-04-30 DIAGNOSIS — M70.61 TROCHANTERIC BURSITIS, RIGHT HIP: ICD-10-CM

## 2025-04-30 DIAGNOSIS — M70.62 TROCHANTERIC BURSITIS, LEFT HIP: Primary | ICD-10-CM

## 2025-04-30 PROCEDURE — 1160F RVW MEDS BY RX/DR IN RCRD: CPT | Performed by: PHYSICIAN ASSISTANT

## 2025-04-30 PROCEDURE — 1159F MED LIST DOCD IN RCRD: CPT | Performed by: PHYSICIAN ASSISTANT

## 2025-04-30 PROCEDURE — 20611 DRAIN/INJ JOINT/BURSA W/US: CPT | Performed by: PHYSICIAN ASSISTANT

## 2025-04-30 PROCEDURE — 1125F AMNT PAIN NOTED PAIN PRSNT: CPT | Performed by: PHYSICIAN ASSISTANT

## 2025-04-30 PROCEDURE — 99214 OFFICE O/P EST MOD 30 MIN: CPT | Performed by: PHYSICIAN ASSISTANT

## 2025-04-30 PROCEDURE — 1124F ACP DISCUSS-NO DSCNMKR DOCD: CPT | Performed by: PHYSICIAN ASSISTANT

## 2025-04-30 RX ORDER — BETAMETHASONE SODIUM PHOSPHATE AND BETAMETHASONE ACETATE 3; 3 MG/ML; MG/ML
12 INJECTION, SUSPENSION INTRA-ARTICULAR; INTRALESIONAL; INTRAMUSCULAR; SOFT TISSUE ONCE
Status: COMPLETED | OUTPATIENT
Start: 2025-04-30 | End: 2025-04-30

## 2025-04-30 RX ADMIN — BETAMETHASONE SODIUM PHOSPHATE AND BETAMETHASONE ACETATE 12 MG: 3; 3 INJECTION, SUSPENSION INTRA-ARTICULAR; INTRALESIONAL; INTRAMUSCULAR; SOFT TISSUE at 14:24

## 2025-04-30 NOTE — PROGRESS NOTES
Patient: Edie Curiel                MRN: [id number]       SSN: [id number]  YOB: 1955        AGE: 69 y.o.        SEX: female  Body mass index is 31.32 kg/m².    PCP: Jovany Haque, MYRI[facility] - [facility]ENNEN  04/30/25            REVIEW OF SYSTEMS:  Constitutional: Negative for fever, chills, weight loss and malaise/fatigue.   HENT: Negative.    Eyes: Negative.    Respiratory: Negative.   Cardiovascular: Negative.   Gastrointestinal: No bowel incontinence or constipation.  Genitourinary: No bladder incontinence or saddle anesthesia.  Skin: Negative.   Neurological: Negative.    Endo/Heme/Allergies: Negative.    Psychiatric/Behavioral: Negative.  Musculoskeletal: As per HPI above.     Past Medical History:   Diagnosis Date    Arthritis     Breast cancer (HCC)     Left    Cancer (HCC) 2001    Lobular situ-Left breast    Chronic pain     DVT (deep venous thrombosis) (HCC)     GERD (gastroesophageal reflux disease)     History of therapeutic radiation     Hx of radiation therapy     Hyperlipidemia     Hypertension     no meds    Low back pain potentially associated with radiculopathy     Menopause     Osteopenia     mild    Pulmonary embolism (HCC)     Radiation therapy complication     Left breast    S/P hip replacement     Right hip    Trochanteric bursitis of right hip     Wears glasses          Current Outpatient Medications:     docusate (COLACE) 50 MG/5ML liquid, Take 5 mLs by mouth daily, Disp: , Rfl:     cyclobenzaprine (FLEXERIL) 10 MG tablet, Take 1 tablet by mouth 3 times daily as needed for Muscle spasms, Disp: , Rfl:     Multiple Vitamins-Minerals (THERAPEUTIC MULTIVITAMIN-MINERALS) tablet, Take 1 tablet by mouth daily, Disp: , Rfl:     vitamin D (ERGOCALCIFEROL) 1.25 MG (43606 UT) CAPS capsule, Take 1 capsule by mouth once a week, Disp: , Rfl:     diclofenac (FLECTOR) 1.3 % PTCH patch, Place 1 patch onto the skin 2 times daily, Disp: 60 patch, Rfl: 2    acetaminophen (TYLENOL) 500 MG tablet,

## 2025-05-06 ENCOUNTER — TRANSCRIBE ORDERS (OUTPATIENT)
Facility: HOSPITAL | Age: 70
End: 2025-05-06

## 2025-05-06 DIAGNOSIS — Z12.31 VISIT FOR SCREENING MAMMOGRAM: Primary | ICD-10-CM

## 2025-06-21 ENCOUNTER — HOSPITAL ENCOUNTER (OUTPATIENT)
Facility: HOSPITAL | Age: 70
Discharge: HOME | End: 2025-06-24
Payer: MEDICARE

## 2025-06-21 VITALS — WEIGHT: 207 LBS | HEIGHT: 69 IN | BODY MASS INDEX: 30.66 KG/M2

## 2025-06-21 DIAGNOSIS — Z12.31 VISIT FOR SCREENING MAMMOGRAM: ICD-10-CM

## 2025-06-21 PROCEDURE — 77063 BREAST TOMOSYNTHESIS BI: CPT

## 2025-09-03 ENCOUNTER — OFFICE VISIT (OUTPATIENT)
Age: 70
End: 2025-09-03
Payer: MEDICARE

## 2025-09-03 VITALS — HEIGHT: 69 IN | BODY MASS INDEX: 30.57 KG/M2

## 2025-09-03 DIAGNOSIS — M54.16 LUMBAR RADICULOPATHY: Primary | ICD-10-CM

## 2025-09-03 PROCEDURE — 1124F ACP DISCUSS-NO DSCNMKR DOCD: CPT | Performed by: PHYSICIAN ASSISTANT

## 2025-09-03 PROCEDURE — 1125F AMNT PAIN NOTED PAIN PRSNT: CPT | Performed by: PHYSICIAN ASSISTANT

## 2025-09-03 PROCEDURE — 72170 X-RAY EXAM OF PELVIS: CPT | Performed by: PHYSICIAN ASSISTANT

## 2025-09-03 PROCEDURE — 99214 OFFICE O/P EST MOD 30 MIN: CPT | Performed by: PHYSICIAN ASSISTANT

## 2025-09-03 PROCEDURE — 72100 X-RAY EXAM L-S SPINE 2/3 VWS: CPT | Performed by: PHYSICIAN ASSISTANT

## 2025-09-03 PROCEDURE — 1159F MED LIST DOCD IN RCRD: CPT | Performed by: PHYSICIAN ASSISTANT

## 2025-09-03 PROCEDURE — 1160F RVW MEDS BY RX/DR IN RCRD: CPT | Performed by: PHYSICIAN ASSISTANT

## 2025-09-03 RX ORDER — CYCLOBENZAPRINE HCL 10 MG
10 TABLET ORAL 3 TIMES DAILY PRN
Qty: 30 TABLET | Refills: 1 | Status: SHIPPED | OUTPATIENT
Start: 2025-09-03 | End: 2025-09-13

## 2025-09-03 RX ORDER — METHYLPREDNISOLONE 4 MG/1
TABLET ORAL
Qty: 1 KIT | Refills: 0 | Status: SHIPPED | OUTPATIENT
Start: 2025-09-03 | End: 2025-09-09

## (undated) DEVICE — WRAP HIP NO GEL BAGS --

## (undated) DEVICE — Device

## (undated) DEVICE — GOWN,REINF,POLY,ECL,PP SLV,3XL,XLONG: Brand: MEDLINE

## (undated) DEVICE — SOLUTION IRRIG 3000ML LAC R FLX CONT

## (undated) DEVICE — GUIDEWIRE VASC L180CM DIA0.035IN TIP L5CM PTFE COAT S STL

## (undated) DEVICE — SPONGE LAP 18X18IN STRL -- 5/PK

## (undated) DEVICE — PACK PROCEDURE SURG VASC CATH 161 MMC LF

## (undated) DEVICE — CATHETER ANGIO AD PED 4FR L65CM GWIRE 0.035IN PERIPH

## (undated) DEVICE — INTENDED FOR TISSUE SEPARATION, AND OTHER PROCEDURES THAT REQUIRE A SHARP SURGICAL BLADE TO PUNCTURE OR CUT.: Brand: BARD-PARKER SAFETY BLADES SIZE 10, STERILE

## (undated) DEVICE — SYR LR LCK 1ML GRAD NSAF 30ML --

## (undated) DEVICE — INTRODUCER SHTH 4FR CANN L11CM DIL TIP 25MM RED TUNGSTEN

## (undated) DEVICE — 3M™ STERI-DRAPE™ U-DRAPE 1015: Brand: STERI-DRAPE™

## (undated) DEVICE — PROCEDURE KIT FLUID MGMT 10 FR CUST MAINFOLD

## (undated) DEVICE — X-RAY SPONGES,12 PLY: Brand: DERMACEA

## (undated) DEVICE — ANGIOGRAPHY KIT CUST VASC

## (undated) DEVICE — SET FLD ADMIN 3 W STPCOCK FIX FEM L BOR 1IN

## (undated) DEVICE — SYRINGE MED 3ML NDL 22GA L1 1/2IN REG BVL SFGLDE

## (undated) DEVICE — NEEDLE SPNL 22GA L3.5IN BLK HUB S STL REG WALL FIT STYL W/

## (undated) DEVICE — NEEDLE HYPO 18GA L1.5IN PNK S STL HUB POLYPR SHLD REG BVL

## (undated) DEVICE — BIPOLAR SEALER 23-112-1 AQM 6.0: Brand: AQUAMANTYS ®

## (undated) DEVICE — CATHETER ANGIO 4FR L100CM 135DEG CEREB VERT CRV SEL TEMPO

## (undated) DEVICE — HIP PILLOW, ABDUCTION: Brand: DEROYAL

## (undated) DEVICE — GUNTHER TULIP VENA CAVA FILTER RETRIEVAL SET: Brand: GUNTHER TULIP

## (undated) DEVICE — COVER US PRB W15XL120CM W/ GEL RUBBERBAND TAPE STRP FLD GEN

## (undated) DEVICE — Z DISCONTINUED BY MEDLINE USE 2711682 TRAY SKIN PREP DRY W/ PREM GLV

## (undated) DEVICE — STRYKER PERFORMANCE SERIES SAGITTAL BLADE: Brand: STRYKER PERFORMANCE SERIES

## (undated) DEVICE — HOOD, PEEL-AWAY: Brand: FLYTE

## (undated) DEVICE — PACK PROCEDURE SURG TOT HIP BSHR LF

## (undated) DEVICE — 3M™ STERI-DRAPE™ INSTRUMENT POUCH 1018: Brand: STERI-DRAPE™

## (undated) DEVICE — CATHETER ANGIO BER2 038 4 FRX65 CM TEMPO AQUA

## (undated) DEVICE — GOWN,REINFORCED,POLY,AURORA,XXLARGE,STR: Brand: MEDLINE

## (undated) DEVICE — HANDPIECE SET WITH HIGH FLOW TIP AND SUCTION TUBE: Brand: INTERPULSE

## (undated) DEVICE — KIT CLN UP BON SECOURS MARYV

## (undated) DEVICE — Z DISCONTINUED USE 2744636  DRESSING AQUACEL 14 IN ALG W3.5XL14IN POLYUR FLM CVR W/ HYDRCOLL

## (undated) DEVICE — GEL BAG FOR WRAPS --

## (undated) DEVICE — SLIM BODY SKIN STAPLER: Brand: APPOSE ULC

## (undated) DEVICE — REM POLYHESIVE ADULT PATIENT RETURN ELECTRODE: Brand: VALLEYLAB

## (undated) DEVICE — NEEDLE HYPO 21GA L1.5IN INTRAMUSCULAR S STL LATCH BVL UP

## (undated) DEVICE — INTENDED FOR TISSUE SEPARATION, AND OTHER PROCEDURES THAT REQUIRE A SHARP SURGICAL BLADE TO PUNCTURE OR CUT.: Brand: BARD-PARKER SAFETY BLADES SIZE 15, STERILE

## (undated) DEVICE — 3M™ BAIR PAWS FLEX™ WARMING GOWN, STANDARD, 20 PER CASE 81003: Brand: BAIR PAWS™

## (undated) DEVICE — SYR 10ML LUER LOK 1/5ML GRAD --

## (undated) DEVICE — RADIFOCUS GLIDEWIRE: Brand: GLIDEWIRE

## (undated) DEVICE — STOCKING COMPR XL L16-18IN LNG 19MMHG ANK 10-11IN CALF

## (undated) DEVICE — COVER LT HNDL BLU STRL -- MEDICHOICE

## (undated) DEVICE — 4-PORT MANIFOLD: Brand: NEPTUNE 2

## (undated) DEVICE — SUTURE VCRL SZ 1 L27IN ABSRB VLT CTX L48MM 1 2 CIR SGL ARMED J365H

## (undated) DEVICE — SOLUTION IV 1000ML 0.9% SOD CHL

## (undated) DEVICE — SUTURE VCRL SZ 0 L27IN ABSRB UD L36MM CT-1 1/2 CIR J260H

## (undated) DEVICE — GUIDEWIRE VASC L260CM DIA0.035IN TIP L5CM PTFE COAT S STL

## (undated) DEVICE — PREP SKN CHLRAPRP 26ML TNT -- CONVERT TO ITEM 373320

## (undated) DEVICE — SUTURE MCRYL SZ 2-0 L36IN ABSRB UD L36MM CT-1 1/2 CIR Y945H

## (undated) DEVICE — INTRODUCER SHTH 6FR CANN L11CM DIL TIP 35MM GRN TUNGSTEN

## (undated) DEVICE — SKIN MARKER,REGULAR TIP WITH RULER AND LABELS: Brand: DEVON

## (undated) DEVICE — SUTURE VCRL SZ 2 L27IN ABSRB VLT L65MM TP-1 1/2 CIR J649G

## (undated) DEVICE — PENCIL ES L3M BTTN SWCH S STL HEX LOK BLDE ELECTRD HOLSTER